# Patient Record
Sex: FEMALE | Race: WHITE | NOT HISPANIC OR LATINO | Employment: OTHER | URBAN - METROPOLITAN AREA
[De-identification: names, ages, dates, MRNs, and addresses within clinical notes are randomized per-mention and may not be internally consistent; named-entity substitution may affect disease eponyms.]

---

## 2019-07-16 ENCOUNTER — OFFICE VISIT (OUTPATIENT)
Dept: FAMILY MEDICINE CLINIC | Facility: CLINIC | Age: 49
End: 2019-07-16

## 2019-07-16 VITALS
WEIGHT: 190.4 LBS | OXYGEN SATURATION: 95 % | TEMPERATURE: 98.1 F | HEART RATE: 71 BPM | HEIGHT: 61 IN | SYSTOLIC BLOOD PRESSURE: 146 MMHG | DIASTOLIC BLOOD PRESSURE: 80 MMHG | BODY MASS INDEX: 35.95 KG/M2

## 2019-07-16 DIAGNOSIS — I10 ESSENTIAL HYPERTENSION: ICD-10-CM

## 2019-07-16 DIAGNOSIS — E78.5 HYPERLIPIDEMIA, UNSPECIFIED HYPERLIPIDEMIA TYPE: ICD-10-CM

## 2019-07-16 DIAGNOSIS — Z00.00 HEALTH MAINTENANCE EXAMINATION: Primary | ICD-10-CM

## 2019-07-16 DIAGNOSIS — R82.90 ABNORMAL URINALYSIS: ICD-10-CM

## 2019-07-16 DIAGNOSIS — K21.9 GASTROESOPHAGEAL REFLUX DISEASE WITHOUT ESOPHAGITIS: ICD-10-CM

## 2019-07-16 LAB
BILIRUB BLD-MCNC: NEGATIVE MG/DL
CLARITY, POC: CLEAR
COLOR UR: YELLOW
GLUCOSE UR STRIP-MCNC: NEGATIVE MG/DL
KETONES UR QL: ABNORMAL
LEUKOCYTE EST, POC: ABNORMAL
NITRITE UR-MCNC: NEGATIVE MG/ML
PH UR: 6 [PH] (ref 5–8)
PROT UR STRIP-MCNC: NEGATIVE MG/DL
RBC # UR STRIP: NEGATIVE /UL
SP GR UR: 1.01 (ref 1–1.03)
UROBILINOGEN UR QL: NORMAL

## 2019-07-16 PROCEDURE — 99396 PREV VISIT EST AGE 40-64: CPT | Performed by: NURSE PRACTITIONER

## 2019-07-16 PROCEDURE — 81003 URINALYSIS AUTO W/O SCOPE: CPT | Performed by: NURSE PRACTITIONER

## 2019-07-16 RX ORDER — LORATADINE 10 MG/1
10 TABLET ORAL DAILY
COMMUNITY
End: 2021-04-15

## 2019-07-16 RX ORDER — TRAZODONE HYDROCHLORIDE 50 MG/1
50 TABLET ORAL
Refills: 2 | COMMUNITY
Start: 2019-06-18

## 2019-07-16 RX ORDER — MIRTAZAPINE 7.5 MG/1
7.5 TABLET, FILM COATED ORAL
Refills: 2 | COMMUNITY
Start: 2019-06-26 | End: 2019-11-07

## 2019-07-16 RX ORDER — PERPHENAZINE 8 MG/1
8 TABLET ORAL
COMMUNITY
End: 2022-11-01

## 2019-07-16 RX ORDER — AMLODIPINE BESYLATE 5 MG/1
5 TABLET ORAL DAILY
Refills: 1 | COMMUNITY
Start: 2019-06-18 | End: 2019-07-22 | Stop reason: SDUPTHER

## 2019-07-16 RX ORDER — BENZTROPINE MESYLATE 1 MG/1
1 TABLET ORAL 2 TIMES DAILY
Refills: 5 | COMMUNITY
Start: 2019-06-18 | End: 2021-05-12 | Stop reason: SDUPTHER

## 2019-07-16 RX ORDER — BENZOCAINE AND MENTHOL 15; 2.6 MG/1; MG/1
LOZENGE ORAL
Refills: 5 | COMMUNITY
Start: 2019-06-04 | End: 2022-01-31

## 2019-07-16 RX ORDER — LORAZEPAM 1 MG/1
1 TABLET ORAL 2 TIMES DAILY
Refills: 5 | COMMUNITY
Start: 2019-06-25 | End: 2019-10-08 | Stop reason: SDUPTHER

## 2019-07-16 RX ORDER — METOPROLOL SUCCINATE 50 MG/1
50 TABLET, EXTENDED RELEASE ORAL DAILY
Refills: 3 | COMMUNITY
Start: 2019-06-30 | End: 2019-07-22 | Stop reason: SDUPTHER

## 2019-07-16 RX ORDER — NORGESTIMATE AND ETHINYL ESTRADIOL 7DAYSX3 LO
KIT ORAL
COMMUNITY
End: 2022-11-01 | Stop reason: ALTCHOICE

## 2019-07-16 RX ORDER — DOCUSATE SODIUM 100 MG/1
CAPSULE, LIQUID FILLED ORAL
COMMUNITY
End: 2020-05-18

## 2019-07-16 RX ORDER — OMEPRAZOLE 40 MG/1
40 CAPSULE, DELAYED RELEASE ORAL DAILY
Refills: 5 | COMMUNITY
Start: 2019-06-26 | End: 2019-08-20 | Stop reason: SDUPTHER

## 2019-07-16 RX ORDER — MIRTAZAPINE 15 MG/1
15 TABLET, FILM COATED ORAL
Refills: 1 | COMMUNITY
Start: 2019-04-12 | End: 2019-11-07

## 2019-07-16 RX ORDER — DIVALPROEX SODIUM 500 MG/1
1000 TABLET, DELAYED RELEASE ORAL 2 TIMES DAILY
Refills: 2 | COMMUNITY
Start: 2019-06-18

## 2019-07-16 RX ORDER — FLUTICASONE PROPIONATE 50 MCG
SPRAY, SUSPENSION (ML) NASAL
Refills: 11 | COMMUNITY
Start: 2019-05-30 | End: 2020-06-24

## 2019-07-16 RX ORDER — HALOPERIDOL 10 MG/1
10 TABLET ORAL 2 TIMES DAILY
Refills: 2 | COMMUNITY
Start: 2019-06-26 | End: 2023-02-01

## 2019-07-16 RX ORDER — 1.1% SODIUM FLUORIDE PRESCRIPTION DENTAL CREAM 5 MG/G
CREAM DENTAL
Refills: 3 | COMMUNITY
Start: 2019-05-30 | End: 2019-12-03 | Stop reason: SDUPTHER

## 2019-07-16 RX ORDER — CHLORHEXIDINE GLUCONATE 0.12 MG/ML
RINSE ORAL
Refills: 0 | COMMUNITY
Start: 2019-06-13 | End: 2019-07-22 | Stop reason: SDUPTHER

## 2019-07-16 RX ORDER — GEMFIBROZIL 600 MG/1
600 TABLET, FILM COATED ORAL
COMMUNITY
End: 2022-11-01

## 2019-07-16 RX ORDER — ACETAMINOPHEN AND CHLORPHENIRAMINE MALEATE 325; 2 MG/1; MG/1
1 TABLET, FILM COATED ORAL EVERY 8 HOURS PRN
Refills: 0 | COMMUNITY
Start: 2019-06-04 | End: 2019-08-07 | Stop reason: SDUPTHER

## 2019-07-16 NOTE — PROGRESS NOTES
"Subjective   Charity Orr is a 48 y.o. female here for her annual physical with me. Charity is here for coordination of medical care, to discuss health maintenance, disease prevention as well as to followup on medical problems. History is obtained from caregiver.    History of Present Illness   Patient has been followed by Dr. Rodriguez for about 10 years. Patient's last CPE was 7/2/18. Activity level is minimal. Exercises 5 days per week. Appetite is good. Feels well with no complaints. Energy level is good. Sleeps well. Patient's last mammogram was this year per patient. Mammogram is not available for review at this time. Patient is doing routine self skin exam occasionally. Patient is not doing routine self-breast exams.  Patient's last Pap smear was on 4/17/2019 performed by JANKI Rivers.     Review of Systems   Constitutional: Negative for fever.   HENT: Negative for ear pain, rhinorrhea and sore throat.    Eyes: Negative for visual disturbance.   Respiratory: Negative for cough and shortness of breath.    Cardiovascular: Negative for chest pain.   Gastrointestinal: Negative for abdominal pain, nausea and vomiting.   Genitourinary: Negative for dysuria.   Musculoskeletal: Negative.    Skin: Negative.    Neurological: Negative for dizziness and headache.   Psychiatric/Behavioral: Negative for suicidal ideas and depressed mood.        Objective   Visit Vitals  /80 (BP Location: Left arm, Patient Position: Sitting)   Pulse 71   Temp 98.1 °F (36.7 °C)   Ht 154.9 cm (61\")   Wt 86.4 kg (190 lb 6.4 oz)   SpO2 95%   BMI 35.98 kg/m²     Physical Exam   Constitutional: She appears well-developed.   HENT:   Right Ear: Tympanic membrane and ear canal normal.   Left Ear: Tympanic membrane and ear canal normal.   Nose: Nose normal.   Mouth/Throat: Oropharynx is clear and moist.   Eyes: Pupils are equal, round, and reactive to light.   Neck: Neck supple.   Cardiovascular: Normal rate, regular rhythm and normal " heart sounds.   Pulmonary/Chest: Effort normal and breath sounds normal.   Abdominal: Soft. Bowel sounds are normal.   Genitourinary:   Genitourinary Comments: deferred   Musculoskeletal: Normal range of motion.   Neurological: She is alert.   Skin: Skin is warm and dry.   Psychiatric: She has a normal mood and affect.   Nursing note and vitals reviewed.      Assessment/Plan   Problem List Items Addressed This Visit        Cardiovascular and Mediastinum    Hypertension    Relevant Medications    amLODIPine (NORVASC) 5 MG tablet    metoprolol succinate XL (TOPROL-XL) 50 MG 24 hr tablet    Other Relevant Orders    Comprehensive metabolic panel       Digestive    GERD (gastroesophageal reflux disease)    Relevant Medications    omeprazole (priLOSEC) 40 MG capsule      Other Visit Diagnoses     Health maintenance examination    -  Primary    Relevant Orders    CBC and Differential    POC Urinalysis Dipstick, Automated (Completed)    Hyperlipidemia, unspecified hyperlipidemia type        Relevant Medications    gemfibrozil (LOPID) 600 MG tablet    Other Relevant Orders    Lipid Panel With LDL/HDL Ratio    Abnormal urinalysis        Relevant Orders    Urine Culture - Urine, Urine, Clean Catch               Encouraged to check her skin and breasts monthly. Reviewed exercising regularly and eating a balanced diet.

## 2019-07-17 LAB
ALBUMIN SERPL-MCNC: 4.1 G/DL (ref 3.5–5.2)
ALBUMIN/GLOB SERPL: 2 G/DL
ALP SERPL-CCNC: 60 U/L (ref 39–117)
ALT SERPL-CCNC: 11 U/L (ref 1–33)
AST SERPL-CCNC: 21 U/L (ref 1–32)
BASOPHILS # BLD AUTO: 0.05 10*3/MM3 (ref 0–0.2)
BASOPHILS NFR BLD AUTO: 0.4 % (ref 0–1.5)
BILIRUB SERPL-MCNC: 0.2 MG/DL (ref 0.2–1.2)
BUN SERPL-MCNC: 9 MG/DL (ref 6–20)
BUN/CREAT SERPL: 18 (ref 7–25)
CALCIUM SERPL-MCNC: 8.9 MG/DL (ref 8.6–10.5)
CHLORIDE SERPL-SCNC: 103 MMOL/L (ref 98–107)
CHOLEST SERPL-MCNC: 103 MG/DL (ref 0–200)
CO2 SERPL-SCNC: 25.9 MMOL/L (ref 22–29)
CREAT SERPL-MCNC: 0.5 MG/DL (ref 0.57–1)
EOSINOPHIL # BLD AUTO: 0.1 10*3/MM3 (ref 0–0.4)
EOSINOPHIL NFR BLD AUTO: 0.9 % (ref 0.3–6.2)
ERYTHROCYTE [DISTWIDTH] IN BLOOD BY AUTOMATED COUNT: 14.4 % (ref 12.3–15.4)
GLOBULIN SER CALC-MCNC: 2.1 GM/DL
GLUCOSE SERPL-MCNC: 100 MG/DL (ref 65–99)
HCT VFR BLD AUTO: 44.5 % (ref 34–46.6)
HDLC SERPL-MCNC: 32 MG/DL (ref 40–60)
HGB BLD-MCNC: 13.9 G/DL (ref 12–15.9)
IMM GRANULOCYTES # BLD AUTO: 0.04 10*3/MM3 (ref 0–0.05)
IMM GRANULOCYTES NFR BLD AUTO: 0.3 % (ref 0–0.5)
LDLC SERPL CALC-MCNC: 8 MG/DL (ref 0–100)
LDLC/HDLC SERPL: 0.24 {RATIO}
LYMPHOCYTES # BLD AUTO: 4.28 10*3/MM3 (ref 0.7–3.1)
LYMPHOCYTES NFR BLD AUTO: 36.9 % (ref 19.6–45.3)
MCH RBC QN AUTO: 30.6 PG (ref 26.6–33)
MCHC RBC AUTO-ENTMCNC: 31.2 G/DL (ref 31.5–35.7)
MCV RBC AUTO: 98 FL (ref 79–97)
MONOCYTES # BLD AUTO: 0.71 10*3/MM3 (ref 0.1–0.9)
MONOCYTES NFR BLD AUTO: 6.1 % (ref 5–12)
NEUTROPHILS # BLD AUTO: 6.41 10*3/MM3 (ref 1.7–7)
NEUTROPHILS NFR BLD AUTO: 55.4 % (ref 42.7–76)
NRBC BLD AUTO-RTO: 0 /100 WBC (ref 0–0.2)
PLATELET # BLD AUTO: 221 10*3/MM3 (ref 140–450)
POTASSIUM SERPL-SCNC: 4.1 MMOL/L (ref 3.5–5.2)
PROT SERPL-MCNC: 6.2 G/DL (ref 6–8.5)
RBC # BLD AUTO: 4.54 10*6/MM3 (ref 3.77–5.28)
SODIUM SERPL-SCNC: 141 MMOL/L (ref 136–145)
TRIGL SERPL-MCNC: 317 MG/DL (ref 0–150)
VLDLC SERPL CALC-MCNC: 63.4 MG/DL
WBC # BLD AUTO: 11.59 10*3/MM3 (ref 3.4–10.8)

## 2019-07-18 LAB
BACTERIA UR CULT: NORMAL
BACTERIA UR CULT: NORMAL

## 2019-07-19 ENCOUNTER — TELEPHONE (OUTPATIENT)
Dept: FAMILY MEDICINE CLINIC | Facility: CLINIC | Age: 49
End: 2019-07-19

## 2019-07-19 NOTE — TELEPHONE ENCOUNTER
White blood cell count is elevated which is consistent with history of leukocytosis.  We will continue to monitor.    Triglycerides are elevated.  Has patient been taking gemfibrozil 600 mg twice daily?  If not, was there a reason for discontinuation?

## 2019-07-22 RX ORDER — CHLORHEXIDINE GLUCONATE 0.12 MG/ML
RINSE ORAL
Qty: 473 ML | Refills: 11 | Status: SHIPPED | OUTPATIENT
Start: 2019-07-22 | End: 2021-08-02

## 2019-07-22 RX ORDER — AMLODIPINE BESYLATE 5 MG/1
5 TABLET ORAL DAILY
Qty: 30 TABLET | Refills: 11 | Status: SHIPPED | OUTPATIENT
Start: 2019-07-22 | End: 2020-08-10

## 2019-07-22 RX ORDER — METOPROLOL SUCCINATE 50 MG/1
50 TABLET, EXTENDED RELEASE ORAL DAILY
Qty: 30 TABLET | Refills: 11 | Status: SHIPPED | OUTPATIENT
Start: 2019-07-22 | End: 2020-06-15

## 2019-08-08 RX ORDER — ACETAMINOPHEN AND CHLORPHENIRAMINE MALEATE 325; 2 MG/1; MG/1
TABLET, FILM COATED ORAL
Qty: 10 TABLET | Refills: 11 | Status: SHIPPED | OUTPATIENT
Start: 2019-08-08 | End: 2022-02-01

## 2019-08-20 RX ORDER — OMEPRAZOLE 40 MG/1
40 CAPSULE, DELAYED RELEASE ORAL DAILY
Qty: 30 CAPSULE | Refills: 2 | Status: SHIPPED | OUTPATIENT
Start: 2019-08-20 | End: 2019-11-04 | Stop reason: SDUPTHER

## 2019-09-24 ENCOUNTER — OFFICE VISIT (OUTPATIENT)
Dept: FAMILY MEDICINE CLINIC | Facility: CLINIC | Age: 49
End: 2019-09-24

## 2019-09-24 VITALS
BODY MASS INDEX: 37.08 KG/M2 | TEMPERATURE: 97.8 F | HEIGHT: 61 IN | DIASTOLIC BLOOD PRESSURE: 74 MMHG | WEIGHT: 196.4 LBS | HEART RATE: 85 BPM | SYSTOLIC BLOOD PRESSURE: 126 MMHG | OXYGEN SATURATION: 95 %

## 2019-09-24 DIAGNOSIS — I10 ESSENTIAL HYPERTENSION: Primary | ICD-10-CM

## 2019-09-24 DIAGNOSIS — Z23 NEED FOR IMMUNIZATION AGAINST INFLUENZA: ICD-10-CM

## 2019-09-24 DIAGNOSIS — K21.9 GASTROESOPHAGEAL REFLUX DISEASE WITHOUT ESOPHAGITIS: ICD-10-CM

## 2019-09-24 PROBLEM — F25.9 SCHIZOAFFECTIVE DISORDER: Status: ACTIVE | Noted: 2018-08-27

## 2019-09-24 PROCEDURE — 99213 OFFICE O/P EST LOW 20 MIN: CPT | Performed by: INTERNAL MEDICINE

## 2019-09-24 PROCEDURE — 90471 IMMUNIZATION ADMIN: CPT | Performed by: INTERNAL MEDICINE

## 2019-09-24 PROCEDURE — 90686 IIV4 VACC NO PRSV 0.5 ML IM: CPT | Performed by: INTERNAL MEDICINE

## 2019-09-24 RX ORDER — DIPHENOXYLATE HYDROCHLORIDE AND ATROPINE SULFATE 2.5; .025 MG/1; MG/1
1 TABLET ORAL DAILY
Refills: 11 | COMMUNITY
Start: 2019-09-10 | End: 2020-05-18

## 2019-09-24 RX ORDER — BENZOCAINE AND MENTHOL 15; 2.6 MG/1; MG/1
LOZENGE ORAL
Refills: 11 | COMMUNITY
Start: 2019-08-07 | End: 2019-09-24

## 2019-09-24 NOTE — PROGRESS NOTES
Subjective   Charity Orr is a 48 y.o. female.     Chief Complaint   Patient presents with   • other       History of Present Illness   Here for follow up and blood pressure protocol.  Follow-up for hypertension.  Currently has been feeling well and asymptomatic without any headaches,vision changes, cough, chest pain, shortness of breath, swelling, focal neurologic deficit, memory loss or syncope.  Has been taking the medications regularly and adherent with the regimen, Denies medication side effects and no significant interval events.  Home blood pressure has been doing well in the group home.  Patient with schizoaffective disorder and with caregiver from the group home.  Still has some delusions that are unchanges.    The following portions of the patient's history were reviewed and updated as appropriate: allergies, current medications, past family history, past medical history, past social history, past surgical history and problem list.    Past Medical History:   Diagnosis Date   • Allergic rhinitis    • Back pain    • Dietary calcium deficiency    • Esophageal reflux    • Essential hypertriglyceridemia    • Flu vaccine need    • Foot pain, left    • GERD (gastroesophageal reflux disease)    • History of allergy    • Hyperlipidemia    • Hypertension    • Left foot pain    • Leukocytosis    • Lumbar muscle pain    • Mild mental retardation    • Mild mental retardation    • Pain    • Schizophrenic disorder (CMS/HCC)    • Soft tissue mass    • Sprain of ankle    • Tinea pedis    • Tremor due to multiple drugs    • UTI (urinary tract infection)        No past surgical history on file.    Family History   Problem Relation Age of Onset   • No Known Problems Mother        Social History     Socioeconomic History   • Marital status: Single     Spouse name: Not on file   • Number of children: Not on file   • Years of education: Not on file   • Highest education level: Not on file   Tobacco Use   • Smoking status:  Never Smoker   • Smokeless tobacco: Never Used   Substance and Sexual Activity   • Alcohol use: No     Frequency: Never   • Drug use: No       Review of Systems   Constitutional: Negative for activity change, appetite change, fatigue, fever, unexpected weight gain and unexpected weight loss.   HENT: Negative for nosebleeds, rhinorrhea, trouble swallowing and voice change.    Eyes: Negative for visual disturbance.   Respiratory: Negative for cough, chest tightness, shortness of breath and wheezing.    Cardiovascular: Negative for chest pain, palpitations and leg swelling.   Gastrointestinal: Negative for abdominal pain, blood in stool, constipation, diarrhea, nausea, vomiting, GERD and indigestion.   Genitourinary: Negative for dysuria, frequency and hematuria.   Musculoskeletal: Negative for arthralgias, back pain and myalgias.   Skin: Negative for rash and bruise.   Neurological: Negative for dizziness, tremors, weakness, light-headedness, numbness, headache and memory problem.   Hematological: Negative for adenopathy. Does not bruise/bleed easily.   Psychiatric/Behavioral: Negative for sleep disturbance and depressed mood. The patient is not nervous/anxious.        Objective   Vitals:    09/24/19 1127   BP: 126/74   Pulse: 85   Temp: 97.8 °F (36.6 °C)   SpO2: 95%     Body mass index is 37.11 kg/m².  Physical Exam   Constitutional: She is oriented to person, place, and time. She appears well-developed and well-nourished. No distress.   HENT:   Head: Normocephalic and atraumatic.   Right Ear: External ear normal.   Left Ear: External ear normal.   Nose: Nose normal.   Mouth/Throat: Oropharynx is clear and moist.   Eyes: Conjunctivae and EOM are normal. Pupils are equal, round, and reactive to light.   Neck: Normal range of motion. Neck supple. No tracheal deviation present. No thyromegaly present.   Cardiovascular: Normal rate, regular rhythm, normal heart sounds and intact distal pulses. Exam reveals no gallop and  no friction rub.   No murmur heard.  Pulmonary/Chest: Effort normal and breath sounds normal. No respiratory distress.   Abdominal: Soft. Bowel sounds are normal. She exhibits no mass. There is no tenderness. There is no guarding.   Musculoskeletal: Normal range of motion. She exhibits no edema.   Lymphadenopathy:     She has no cervical adenopathy.   Neurological: She is alert and oriented to person, place, and time. She displays normal reflexes. She exhibits normal muscle tone.   Skin: Skin is warm and dry. Capillary refill takes less than 2 seconds. No rash noted. She is not diaphoretic.   Psychiatric: She has a normal mood and affect. Her behavior is normal. Judgment and thought content normal.   Nursing note and vitals reviewed.      Recent Results (from the past 2016 hour(s))   Comprehensive metabolic panel    Collection Time: 07/16/19 10:05 AM   Result Value Ref Range    Glucose 100 (H) 65 - 99 mg/dL    BUN 9 6 - 20 mg/dL    Creatinine 0.50 (L) 0.57 - 1.00 mg/dL    eGFR Non African Am 132 >60 mL/min/1.73    eGFR African Am >150 >60 mL/min/1.73    BUN/Creatinine Ratio 18.0 7.0 - 25.0    Sodium 141 136 - 145 mmol/L    Potassium 4.1 3.5 - 5.2 mmol/L    Chloride 103 98 - 107 mmol/L    Total CO2 25.9 22.0 - 29.0 mmol/L    Calcium 8.9 8.6 - 10.5 mg/dL    Total Protein 6.2 6.0 - 8.5 g/dL    Albumin 4.10 3.50 - 5.20 g/dL    Globulin 2.1 gm/dL    A/G Ratio 2.0 g/dL    Total Bilirubin 0.2 0.2 - 1.2 mg/dL    Alkaline Phosphatase 60 39 - 117 U/L    AST (SGOT) 21 1 - 32 U/L    ALT (SGPT) 11 1 - 33 U/L   Lipid Panel With LDL/HDL Ratio    Collection Time: 07/16/19 10:05 AM   Result Value Ref Range    Total Cholesterol 103 0 - 200 mg/dL    Triglycerides 317 (H) 0 - 150 mg/dL    HDL Cholesterol 32 (L) 40 - 60 mg/dL    VLDL Cholesterol 63.4 mg/dL    LDL Cholesterol  8 0 - 100 mg/dL    LDL/HDL Ratio 0.24    CBC and Differential    Collection Time: 07/16/19 10:05 AM   Result Value Ref Range    WBC 11.59 (H) 3.40 - 10.80 10*3/mm3     RBC 4.54 3.77 - 5.28 10*6/mm3    Hemoglobin 13.9 12.0 - 15.9 g/dL    Hematocrit 44.5 34.0 - 46.6 %    MCV 98.0 (H) 79.0 - 97.0 fL    MCH 30.6 26.6 - 33.0 pg    MCHC 31.2 (L) 31.5 - 35.7 g/dL    RDW 14.4 12.3 - 15.4 %    Platelets 221 140 - 450 10*3/mm3    Neutrophil Rel % 55.4 42.7 - 76.0 %    Lymphocyte Rel % 36.9 19.6 - 45.3 %    Monocyte Rel % 6.1 5.0 - 12.0 %    Eosinophil Rel % 0.9 0.3 - 6.2 %    Basophil Rel % 0.4 0.0 - 1.5 %    Neutrophils Absolute 6.41 1.70 - 7.00 10*3/mm3    Lymphocytes Absolute 4.28 (H) 0.70 - 3.10 10*3/mm3    Monocytes Absolute 0.71 0.10 - 0.90 10*3/mm3    Eosinophils Absolute 0.10 0.00 - 0.40 10*3/mm3    Basophils Absolute 0.05 0.00 - 0.20 10*3/mm3    Immature Granulocyte Rel % 0.3 0.0 - 0.5 %    Immature Grans Absolute 0.04 0.00 - 0.05 10*3/mm3    nRBC 0.0 0.0 - 0.2 /100 WBC   POC Urinalysis Dipstick, Automated    Collection Time: 07/16/19 11:13 AM   Result Value Ref Range    Color Yellow Yellow, Straw, Dark Yellow, Monse    Clarity, UA Clear Clear    Specific Gravity  1.015 1.005 - 1.030    pH, Urine 6.0 5.0 - 8.0    Leukocytes Large (3+) (A) Negative    Nitrite, UA Negative Negative    Protein, POC Negative Negative mg/dL    Glucose, UA Negative Negative, 1000 mg/dL (3+) mg/dL    Ketones, UA Trace (A) Negative    Urobilinogen, UA Normal Normal    Bilirubin Negative Negative    Blood, UA Negative Negative   Urine Culture - Urine, Urine, Clean Catch    Collection Time: 07/16/19 12:00 PM   Result Value Ref Range    Urine Culture Final report     Result 1 Comment      Assessment/Plan   Charity was seen today for other.    Diagnoses and all orders for this visit:    Essential hypertension    Gastroesophageal reflux disease without esophagitis    Need for immunization against influenza  -     Fluarix/Fluzone/Afluria Quad>6 Months; Standing  -     Fluarix/Fluzone/Afluria Quad>6 Months    Continue the current medications and no changes.  Labs reviewed.  Given note for blood pressure and GERD  protocol.  See note.

## 2019-10-08 RX ORDER — LORAZEPAM 1 MG/1
TABLET ORAL
Qty: 60 TABLET | Refills: 2 | Status: SHIPPED | OUTPATIENT
Start: 2019-10-08 | End: 2019-10-14 | Stop reason: SDUPTHER

## 2019-10-14 RX ORDER — LORAZEPAM 1 MG/1
TABLET ORAL
Qty: 60 TABLET | Refills: 2 | Status: SHIPPED | OUTPATIENT
Start: 2019-10-14 | End: 2020-02-24

## 2019-11-04 RX ORDER — OMEPRAZOLE 40 MG/1
CAPSULE, DELAYED RELEASE ORAL
Qty: 30 CAPSULE | Refills: 2 | Status: SHIPPED | OUTPATIENT
Start: 2019-11-04 | End: 2020-01-28

## 2019-11-07 RX ORDER — MIRTAZAPINE 7.5 MG/1
7.5 TABLET, FILM COATED ORAL
Qty: 30 TABLET | Refills: 5 | Status: SHIPPED | OUTPATIENT
Start: 2019-11-07 | End: 2020-03-23

## 2019-12-03 RX ORDER — LORAZEPAM 1 MG/1
TABLET ORAL
Qty: 60 TABLET | Refills: 1 | Status: SHIPPED | OUTPATIENT
Start: 2019-12-03 | End: 2020-08-10

## 2019-12-04 RX ORDER — 1.1% SODIUM FLUORIDE PRESCRIPTION DENTAL CREAM 5 MG/G
CREAM DENTAL
Qty: 51 G | Refills: 1 | Status: SHIPPED | OUTPATIENT
Start: 2019-12-04 | End: 2020-01-20

## 2019-12-24 ENCOUNTER — APPOINTMENT (OUTPATIENT)
Dept: GENERAL RADIOLOGY | Facility: HOSPITAL | Age: 49
End: 2019-12-24

## 2019-12-24 ENCOUNTER — HOSPITAL ENCOUNTER (EMERGENCY)
Facility: HOSPITAL | Age: 49
Discharge: HOME | End: 2019-12-24
Attending: EMERGENCY MEDICINE | Admitting: EMERGENCY MEDICINE

## 2019-12-24 VITALS
TEMPERATURE: 99.1 F | RESPIRATION RATE: 16 BRPM | SYSTOLIC BLOOD PRESSURE: 155 MMHG | HEART RATE: 73 BPM | DIASTOLIC BLOOD PRESSURE: 96 MMHG | WEIGHT: 198 LBS | OXYGEN SATURATION: 98 % | BODY MASS INDEX: 36.44 KG/M2 | HEIGHT: 62 IN

## 2019-12-24 DIAGNOSIS — F20.9 SCHIZOPHRENIA, UNSPECIFIED TYPE (HCC): ICD-10-CM

## 2019-12-24 DIAGNOSIS — G89.29 CHRONIC PAIN OF RIGHT ANKLE: ICD-10-CM

## 2019-12-24 DIAGNOSIS — M54.50 ACUTE MIDLINE LOW BACK PAIN WITHOUT SCIATICA: Primary | ICD-10-CM

## 2019-12-24 DIAGNOSIS — M25.571 CHRONIC PAIN OF RIGHT ANKLE: ICD-10-CM

## 2019-12-24 PROCEDURE — 72110 X-RAY EXAM L-2 SPINE 4/>VWS: CPT

## 2019-12-24 PROCEDURE — 73610 X-RAY EXAM OF ANKLE: CPT

## 2019-12-24 PROCEDURE — 99283 EMERGENCY DEPT VISIT LOW MDM: CPT

## 2019-12-24 RX ORDER — ACETAMINOPHEN 500 MG
1000 TABLET ORAL ONCE
Status: COMPLETED | OUTPATIENT
Start: 2019-12-24 | End: 2019-12-24

## 2019-12-24 RX ADMIN — ACETAMINOPHEN 1000 MG: 500 TABLET, FILM COATED ORAL at 20:50

## 2019-12-25 NOTE — ED NOTES
This RN spoke to pt son Zak to inform of d/c back to group home.      Maricel Preciado, RN  12/24/19 0383
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
## 2019-12-25 NOTE — DISCHARGE INSTRUCTIONS
Use over-the-counter Tylenol as needed for pain.  Follow-up with your family doctor.  You do not have any broken bones.

## 2019-12-25 NOTE — ED NOTES
This RN spoke to Emily from [facility] where pt resides. Report given to Emily and she is enroute to pick pt up from ED.      Maricel Preciado RN  12/24/19 0213

## 2019-12-25 NOTE — ED NOTES
Pt son Rizwan, who is POA, called to give contact information. Phone number is [phone]     Maricel Perciado RN  12/24/19 2032
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
## 2020-01-20 RX ORDER — 1.1% SODIUM FLUORIDE PRESCRIPTION DENTAL CREAM 5 MG/G
CREAM DENTAL
Qty: 51 G | Refills: 5 | Status: SHIPPED | OUTPATIENT
Start: 2020-01-20 | End: 2021-08-02

## 2020-01-22 ENCOUNTER — OFFICE VISIT (OUTPATIENT)
Dept: FAMILY MEDICINE CLINIC | Facility: CLINIC | Age: 50
End: 2020-01-22

## 2020-01-22 VITALS
TEMPERATURE: 97.6 F | SYSTOLIC BLOOD PRESSURE: 106 MMHG | OXYGEN SATURATION: 95 % | WEIGHT: 202 LBS | HEART RATE: 80 BPM | DIASTOLIC BLOOD PRESSURE: 70 MMHG | HEIGHT: 62 IN | BODY MASS INDEX: 37.17 KG/M2

## 2020-01-22 DIAGNOSIS — E66.9 CLASS 2 OBESITY WITHOUT SERIOUS COMORBIDITY WITH BODY MASS INDEX (BMI) OF 36.0 TO 36.9 IN ADULT, UNSPECIFIED OBESITY TYPE: ICD-10-CM

## 2020-01-22 DIAGNOSIS — S39.012A STRAIN OF LUMBAR REGION, INITIAL ENCOUNTER: Primary | ICD-10-CM

## 2020-01-22 PROCEDURE — 99213 OFFICE O/P EST LOW 20 MIN: CPT | Performed by: INTERNAL MEDICINE

## 2020-01-22 RX ORDER — ASCORBIC ACID 500 MG
500 TABLET ORAL DAILY
COMMUNITY
Start: 2019-12-30 | End: 2020-05-18

## 2020-01-22 NOTE — PATIENT INSTRUCTIONS
Back Exercises  These exercises help to make your trunk and back strong. They also help to keep the lower back flexible. Doing these exercises can help to prevent back pain or lessen existing pain.  · If you have back pain, try to do these exercises 2-3 times each day or as told by your doctor.  · As you get better, do the exercises once each day. Repeat the exercises more often as told by your doctor.  · To stop back pain from coming back, do the exercises once each day, or as told by your doctor.  Exercises  Single knee to chest  Do these steps 3-5 times in a row for each le. Lie on your back on a firm bed or the floor with your legs stretched out.  2. Bring one knee to your chest.  3. Grab your knee or thigh with both hands and hold them it in place.  4. Pull on your knee until you feel a gentle stretch in your lower back or buttocks.  5. Keep doing the stretch for 10-30 seconds.  6. Slowly let go of your leg and straighten it.  Pelvic tilt  Do these steps 5-10 times in a row:  1. Lie on your back on a firm bed or the floor with your legs stretched out.  2. Bend your knees so they point up to the ceiling. Your feet should be flat on the floor.  3. Tighten your lower belly (abdomen) muscles to press your lower back against the floor. This will make your tailbone point up to the ceiling instead of pointing down to your feet or the floor.  4. Stay in this position for 5-10 seconds while you gently tighten your muscles and breathe evenly.  Cat-cow  Do these steps until your lower back bends more easily:  1. Get on your hands and knees on a firm surface. Keep your hands under your shoulders, and keep your knees under your hips. You may put padding under your knees.  2. Let your head hang down toward your chest. Tighten (contract) the muscles in your belly. Point your tailbone toward the floor so your lower back becomes rounded like the back of a cat.  3. Stay in this position for 5 seconds.  4. Slowly lift your  head. Let the muscles of your belly relax. Point your tailbone up toward the ceiling so your back forms a sagging arch like the back of a cow.  5. Stay in this position for 5 seconds.    Press-ups  Do these steps 5-10 times in a row:  1. Lie on your belly (face-down) on the floor.  2. Place your hands near your head, about shoulder-width apart.  3. While you keep your back relaxed and keep your hips on the floor, slowly straighten your arms to raise the top half of your body and lift your shoulders. Do not use your back muscles. You may change where you place your hands in order to make yourself more comfortable.  4. Stay in this position for 5 seconds.  5. Slowly return to lying flat on the floor.    Bridges  Do these steps 10 times in a row:  1. Lie on your back on a firm surface.  2. Bend your knees so they point up to the ceiling. Your feet should be flat on the floor. Your arms should be flat at your sides, next to your body.  3. Tighten your butt muscles and lift your butt off the floor until your waist is almost as high as your knees. If you do not feel the muscles working in your butt and the back of your thighs, slide your feet 1-2 inches farther away from your butt.  4. Stay in this position for 3-5 seconds.  5. Slowly lower your butt to the floor, and let your butt muscles relax.  If this exercise is too easy, try doing it with your arms crossed over your chest.  Belly crunches  Do these steps 5-10 times in a row:  1. Lie on your back on a firm bed or the floor with your legs stretched out.  2. Bend your knees so they point up to the ceiling. Your feet should be flat on the floor.  3. Cross your arms over your chest.  4. Tip your chin a little bit toward your chest but do not bend your neck.  5. Tighten your belly muscles and slowly raise your chest just enough to lift your shoulder blades a tiny bit off of the floor. Avoid raising your body higher than that, because it can put too much stress on your low  back.  6. Slowly lower your chest and your head to the floor.  Back lifts  Do these steps 5-10 times in a row:  1. Lie on your belly (face-down) with your arms at your sides, and rest your forehead on the floor.  2. Tighten the muscles in your legs and your butt.  3. Slowly lift your chest off of the floor while you keep your hips on the floor. Keep the back of your head in line with the curve in your back. Look at the floor while you do this.  4. Stay in this position for 3-5 seconds.  5. Slowly lower your chest and your face to the floor.  Contact a doctor if:  · Your back pain gets a lot worse when you do an exercise.  · Your back pain does not get better 2 hours after you exercise.  If you have any of these problems, stop doing the exercises. Do not do them again unless your doctor says it is okay.  Get help right away if:  · You have sudden, very bad back pain. If this happens, stop doing the exercises. Do not do them again unless your doctor says it is okay.  This information is not intended to replace advice given to you by your health care provider. Make sure you discuss any questions you have with your health care provider.  Document Released: 01/20/2012 Document Revised: 09/12/2019 Document Reviewed: 09/12/2019  [employer] Interactive Patient Education © 2019 [employer]  Lumbosacral Strain  Lumbosacral strain is an injury that causes pain in the lower back (lumbosacral spine). This injury usually occurs from overstretching the muscles or ligaments along your spine. A strain can affect one or more muscles or cord-like tissues that connect bones to other bones (ligaments).  What are the causes?  This condition may be caused by:  · A hard, direct hit (blow) to the back.  · Excessive stretching of the lower back muscles. This may result from:  ? A fall.  ? Lifting something heavy.  ? Repetitive movements such as bending or crouching.  What increases the risk?  The following factors may increase your risk of  getting this condition:  · Participating in sports or activities that involve:  ? A sudden twist of the back.  ? Pushing or pulling motions.  · Being overweight or obese.  · Having poor strength and flexibility, especially tight hamstrings or weak muscles in the back or abdomen.  · Having too much of a curve in the lower back.  · Having a pelvis that is tilted forward.  What are the signs or symptoms?  The main symptom of this condition is pain in the lower back, at the site of the strain. Pain may extend (radiate) down one or both legs.  How is this diagnosed?  This condition is diagnosed based on:  · Your symptoms.  · Your medical history.  · A physical exam.  ? Your health care provider may push on certain areas of your back to determine the source of your pain.  ? You may be asked to bend forward, backward, and side to side to assess the severity of your pain and your range of motion.  · Imaging tests, such as:  ? X-rays.  ? MRI.    How is this treated?  Treatment for this condition may include:  · Putting heat and cold on the affected area.  · Medicines to help relieve pain and relax your muscles (muscle relaxants).  · NSAIDs to help reduce swelling and discomfort.  When your symptoms improve, it is important to gradually return to your normal routine as soon as possible to reduce pain, avoid stiffness, and avoid loss of muscle strength. Generally, symptoms should improve within 6 weeks of treatment. However, recovery time varies.  Follow these instructions at home:  Managing pain, stiffness, and swelling    · If directed, put ice on the injured area during the first 24 hours after your strain.  ? Put ice in a plastic bag.  ? Place a towel between your skin and the bag.  ? Leave the ice on for 20 minutes, 2-3 times a day.  · If directed, put heat on the affected area as often as told by your health care provider. Use the heat source that your health care provider recommends, such as a moist heat pack or a  heating pad.  ? Place a towel between your skin and the heat source.  ? Leave the heat on for 20-30 minutes.  ? Remove the heat if your skin turns bright red. This is especially important if you are unable to feel pain, heat, or cold. You may have a greater risk of getting burned.  Activity  · Rest and return to your normal activities as told by your health care provider. Ask your health care provider what activities are safe for you.  · Avoid activities that take a lot of energy for as long as told by your health care provider.  General instructions  · Take over-the-counter and prescription medicines only as told by your health care provider.  · Do not drive or use heavy machinery while taking prescription pain medicine.  · Do not use any products that contain nicotine or tobacco, such as cigarettes and e-cigarettes. If you need help quitting, ask your health care provider.  · Keep all follow-up visits as told by your health care provider. This is important.  How is this prevented?  · Use correct form when playing sports and lifting heavy objects.  · Use good posture when sitting and standing.  · Maintain a healthy weight.  · Sleep on a mattress with medium firmness to support your back.  · Be safe and responsible while being active to avoid falls.  · Do at least 150 minutes of moderate-intensity exercise each week, such as brisk walking or water aerobics. Try a form of exercise that takes stress off your back, such as swimming or stationary cycling.  · Maintain physical fitness, including:  ? Strength.  ? Flexibility.  ? Cardiovascular fitness.  ? Endurance.  Contact a health care provider if:  · Your back pain does not improve after 6 weeks of treatment.  · Your symptoms get worse.  Get help right away if:  · Your back pain is severe.  · You cannot stand or walk.  · You have difficulty controlling when you urinate or when you have a bowel movement.  · You feel nauseous or you vomit.  · Your feet get very  cold.  · You have numbness, tingling, weakness, or problems using your arms or legs.  · You develop any of the following:  ? Shortness of breath.  ? Dizziness.  ? Pain in your legs.  ? Weakness in your buttocks or legs.  ? Discoloration of the skin on your toes or legs.  This information is not intended to replace advice given to you by your health care provider. Make sure you discuss any questions you have with your health care provider.  Document Released: 09/27/2006 Document Revised: 07/07/2017 Document Reviewed: 05/21/2017  [employer] Interactive Patient Education © 2019 [employer]      Exercising to Lose Weight  Exercise is structured, repetitive physical activity to improve fitness and health. Getting regular exercise is important for everyone. It is especially important if you are overweight. Being overweight increases your risk of heart disease, stroke, diabetes, high blood pressure, and several types of cancer. Reducing your calorie intake and exercising can help you lose weight.  Exercise is usually categorized as moderate or vigorous intensity. To lose weight, most people need to do a certain amount of moderate-intensity or vigorous-intensity exercise each week.  Moderate-intensity exercise    Moderate-intensity exercise is any activity that gets you moving enough to burn at least three times more energy (calories) than if you were sitting.  Examples of moderate exercise include:  · Walking a mile in 15 minutes.  · Doing light yard work.  · Biking at an easy pace.  Most people should get at least 150 minutes (2 hours and 30 minutes) a week of moderate-intensity exercise to maintain their body weight.  Vigorous-intensity exercise  Vigorous-intensity exercise is any activity that gets you moving enough to burn at least six times more calories than if you were sitting. When you exercise at this intensity, you should be working hard enough that you are not able to carry on a conversation.  Examples of  vigorous exercise include:  · Running.  · Playing a team sport, such as football, basketball, and soccer.  · Jumping rope.  Most people should get at least 75 minutes (1 hour and 15 minutes) a week of vigorous-intensity exercise to maintain their body weight.  How can exercise affect me?  When you exercise enough to burn more calories than you eat, you lose weight. Exercise also reduces body fat and builds muscle. The more muscle you have, the more calories you burn. Exercise also:  · Improves mood.  · Reduces stress and tension.  · Improves your overall fitness, flexibility, and endurance.  · Increases bone strength.  The amount of exercise you need to lose weight depends on:  · Your age.  · The type of exercise.  · Any health conditions you have.  · Your overall physical ability.  Talk to your health care provider about how much exercise you need and what types of activities are safe for you.  What actions can I take to lose weight?  Nutrition    · Make changes to your diet as told by your health care provider or diet and nutrition specialist (dietitian). This may include:  ? Eating fewer calories.  ? Eating more protein.  ? Eating less unhealthy fats.  ? Eating a diet that includes fresh fruits and vegetables, whole grains, low-fat dairy products, and lean protein.  ? Avoiding foods with added fat, salt, and sugar.  · Drink plenty of water while you exercise to prevent dehydration or heat stroke.  Activity  · Choose an activity that you enjoy and set realistic goals. Your health care provider can help you make an exercise plan that works for you.  · Exercise at a moderate or vigorous intensity most days of the week.  ? The intensity of exercise may vary from person to person. You can tell how intense a workout is for you by paying attention to your breathing and heartbeat. Most people will notice their breathing and heartbeat get faster with more intense exercise.  · Do resistance training twice each week, such  as:  ? Push-ups.  ? Sit-ups.  ? Lifting weights.  ? Using resistance bands.  · Getting short amounts of exercise can be just as helpful as long structured periods of exercise. If you have trouble finding time to exercise, try to include exercise in your daily routine.  ? Get up, stretch, and walk around every 30 minutes throughout the day.  ? Go for a walk during your lunch break.  ? Park your car farther away from your destination.  ? If you take public transportation, get off one stop early and walk the rest of the way.  ? Make phone calls while standing up and walking around.  ? Take the stairs instead of elevators or escalators.  · Wear comfortable clothes and shoes with good support.  · Do not exercise so much that you hurt yourself, feel dizzy, or get very short of breath.  Where to find more information  · [place] Department of Health and Human Services: www.hhs.gov  · Centers for Disease Control and Prevention (CDC): www.cdc.gov  Contact a health care provider:  · Before starting a new exercise program.  · If you have questions or concerns about your weight.  · If you have a medical problem that keeps you from exercising.  Get help right away if you have any of the following while exercising:  · Injury.  · Dizziness.  · Difficulty breathing or shortness of breath that does not go away when you stop exercising.  · Chest pain.  · Rapid heartbeat.  Summary  · Being overweight increases your risk of heart disease, stroke, diabetes, high blood pressure, and several types of cancer.  · Losing weight happens when you burn more calories than you eat.  · Reducing the amount of calories you eat in addition to getting regular moderate or vigorous exercise each week helps you lose weight.  This information is not intended to replace advice given to you by your health care provider. Make sure you discuss any questions you have with your health care provider.  Document Released: 01/20/2012 Document Revised: 12/31/2018  Document Reviewed: 12/31/2018  [employer] Interactive Patient Education © 2019 [employer]      MyPlate from [employer]    MyPlate is an outline of a general healthy diet based on the 2010 Dietary Guidelines for Americans, from the U.[place] Department of Agriculture (USDA). It sets guidelines for how much food you should eat from each food group based on your age, sex, and level of physical activity.  What are tips for following MyPlate?  To follow MyPlate recommendations:  · Eat a wide variety of fruits and vegetables, grains, and protein foods.  · Serve smaller portions and eat less food throughout the day.  · Limit portion sizes to avoid overeating.  · Enjoy your food.  · Get at least 150 minutes of exercise every week. This is about 30 minutes each day, 5 or more days per week.  It can be difficult to have every meal look like MyPlate. Think about MyPlate as eating guidelines for an entire day, rather than each individual meal.  Fruits and vegetables  · Make half of your plate fruits and vegetables.  · Eat many different colors of fruits and vegetables each day.  · For a 2,000 calorie daily food plan, eat:  ? 2½ cups of vegetables every day.  ? 2 cups of fruit every day.  · 1 cup is equal to:  ? 1 cup raw or cooked vegetables.  ? 1 cup raw fruit.  ? 1 medium-sized orange, apple, or banana.  ? 1 cup 100% fruit or vegetable juice.  ? 2 cups raw leafy greens, such as lettuce, spinach, or kale.  ? ½ cup dried fruit.  Grains  · One fourth of your plate should be grains.  · Make at least half of the grains you eat each day whole grains.  · For a 2,000 calorie daily food plan, eat 6 oz of grains every day.  · 1 oz is equal to:  ? 1 slice bread.  ? 1 cup cereal.  ? ½ cup cooked rice, cereal, or pasta.  Protein  · One fourth of your plate should be protein.  · Eat a wide variety of protein foods, including meat, poultry, fish, eggs, beans, nuts, and tofu.  · For a 2,000 calorie daily food plan, eat 5½ oz of protein every day.  · 1  oz is equal to:  ? 1 oz meat, poultry, or fish.  ? ¼ cup cooked beans.  ? 1 egg.  ? ½ oz nuts or seeds.  ? 1 Tbsp peanut butter.  Dairy  · Drink fat-free or low-fat (1%) milk.  · Eat or drink dairy as a side to meals.  · For a 2,000 calorie daily food plan, eat or drink 3 cups of dairy every day.  · 1 cup is equal to:  ? 1 cup milk, yogurt, cottage cheese, or soy milk (soy beverage).  ? 2 oz processed cheese.  ? 1½ oz natural cheese.  Fats, oils, salt, and sugars  · Only small amounts of oils are recommended.  · Avoid foods that are high in calories and low in nutritional value (empty calories), like foods high in fat or added sugars.  · Choose foods that are low in salt (sodium). Choose foods that have less than 140 milligrams (mg) of sodium per serving.  · Drink water instead of sugary drinks. Drink enough water each day to keep your urine pale yellow.  Where to find support  · Work with your health care provider or a nutrition specialist (dietitian) to develop a customized eating plan that is right for you.  · Download an sally (mobile application) to help you track your daily food intake.  Where to find more information  · Go to ChooseMyPlate.gov for more information.  · Learn more and log your daily food intake according to MyPlate using USDA's SuperTracker: www.supertracker.usda.gov  Summary  · MyPlate is a general guideline for healthy eating from the USDA. It is based on the 2010 Dietary Guidelines for Americans.  · In general, fruits and vegetables should take up ½ of your plate, grains should take up ¼ of your plate, and protein should take up ¼ of your plate.  This information is not intended to replace advice given to you by your health care provider. Make sure you discuss any questions you have with your health care provider.  Document Released: 01/06/2009 Document Revised: 03/19/2018 Document Reviewed: 03/19/2018  [employer] Interactive Patient Education © 2019 [employer]

## 2020-01-22 NOTE — PROGRESS NOTES
Subjective   Charity Orr is a 49 y.o. female.     Chief Complaint   Patient presents with   • Pain     both legs   • Back Pain     lumbar   • sugar question       History of Present Illness   Complains of some low back pain and present for the last month.    The following portions of the patient's history were reviewed and updated as appropriate: allergies, current medications, past family history, past medical history, past social history, past surgical history and problem list.    Depression Screen: Patient with intellectual disability and answered best as possible.  PHQ-2/PHQ-9 Depression Screening 1/22/2020   Little interest or pleasure in doing things 0   Feeling down, depressed, or hopeless 0   Trouble falling or staying asleep, or sleeping too much 0   Feeling tired or having little energy 0   Poor appetite or overeating 0   Feeling bad about yourself - or that you are a failure or have let yourself or your family down 0   Trouble concentrating on things, such as reading the newspaper or watching television 0   Moving or speaking so slowly that other people could have noticed. Or the opposite - being so fidgety or restless that you have been moving around a lot more than usual 0   Thoughts that you would be better off dead, or of hurting yourself in some way 0   Total Score 0       Past Medical History:   Diagnosis Date   • Allergic rhinitis    • Back pain    • Dietary calcium deficiency    • Esophageal reflux    • Essential hypertriglyceridemia    • Flu vaccine need    • Foot pain, left    • GERD (gastroesophageal reflux disease)    • History of allergy    • Hyperlipidemia    • Hypertension    • Left foot pain    • Leukocytosis    • Lumbar muscle pain    • Mild mental retardation    • Mild mental retardation    • Pain    • Schizophrenic disorder (CMS/HCC)    • Soft tissue mass    • Sprain of ankle    • Tinea pedis    • Tremor due to multiple drugs    • UTI (urinary tract infection)        History  reviewed. No pertinent surgical history.    Family History   Problem Relation Age of Onset   • No Known Problems Mother        Social History     Socioeconomic History   • Marital status: Single     Spouse name: Not on file   • Number of children: Not on file   • Years of education: Not on file   • Highest education level: Not on file   Tobacco Use   • Smoking status: Never Smoker   • Smokeless tobacco: Never Used   Substance and Sexual Activity   • Alcohol use: No     Frequency: Never   • Drug use: No       Current Outpatient Medications   Medication Sig Dispense Refill   • amLODIPine (NORVASC) 5 MG tablet Take 1 tablet by mouth Daily. 30 tablet 11   • Ascorbic Acid 500 MG capsule Take  by mouth.     • benztropine (COGENTIN) 1 MG tablet Take 1 mg by mouth 2 (Two) Times a Day.  5   • calcium carbonate-vitamin d 600-400 MG-UNIT per tablet TAKE ONE TABLET BY MOUTH DAILY 30 tablet 11   • CEPACOL EXTRA STRENGTH 15-2.6 MG lozenge lozenge DISSOLVE ONE LOZENGE AS NEEDED  5   • chlorhexidine (PERIDEX) 0.12 % solution Brush teeth with one-half ounce in the morning and evening. 473 mL 11   • CORICIDIN 2-325 MG tablet TAKE ONE TABLET BY MOUTH EVERY 8 HOURS AS NEEDED 10 tablet 11   • divalproex (DEPAKOTE) 500 MG DR tablet Take 1,000 mg by mouth 2 (Two) Times a Day.  2   • docusate sodium (COLACE) 100 MG capsule Take  by mouth.     • fluticasone (FLONASE) 50 MCG/ACT nasal spray USE ONE SPRAY IN EACH nostil DAILY  11   • gemfibrozil (LOPID) 600 MG tablet Take 600 mg by mouth.     • haloperidol (HALDOL) 10 MG tablet Take 10 mg by mouth 2 (Two) Times a Day.  2   • loratadine (CLARITIN) 10 MG tablet Take 10 mg by mouth Daily.     • LORazepam (ATIVAN) 1 MG tablet TAKE ONE TABLET TWICE DAILY 60 tablet 2   • LORazepam (ATIVAN) 1 MG tablet TAKE ONE TABLET TWICE DAILY 60 tablet 1   • metoprolol succinate XL (TOPROL-XL) 50 MG 24 hr tablet Take 1 tablet by mouth Daily. 30 tablet 11   • MIDOL TEEN 500-25 MG tablet TAKE ONE TABLET BY MOUTH  EVERY 6 TO 8 HOURS AS NEEDED MENSTRUAL CRAMPS  11   • mirtazapine (REMERON) 7.5 MG tablet Take 1 tablet by mouth every night at bedtime. 30 tablet 5   • MULTIPLE VITAMINS-MINERALS ER PO Take  by mouth.     • multivitamin (THERAGRAN) tablet tablet Take 1 tablet by mouth Daily.  11   • norgestimate-ethinyl estradiol (ORTHO TRI-CYCLEN LO) 0.18/0.215/0.25 MG-25 MCG per tablet Take  by mouth.     • omeprazole (priLOSEC) 40 MG capsule TAKE ONE CAPSULE BY MOUTH DAILY 30 capsule 2   • perphenazine (TRILAFON) 8 MG tablet Take 8 mg by mouth.     • SF 5000 PLUS 1.1 % cream USE TO BRUSH TEETH ONCE DAILY 51 g 5   • traZODone (DESYREL) 50 MG tablet Take 50 mg by mouth every night at bedtime.  2   • vitamin C (ASCORBIC ACID) 500 MG tablet Take 500 mg by mouth Daily.       No current facility-administered medications for this visit.        Review of Systems   Constitutional: Negative for activity change, appetite change, fatigue, fever, unexpected weight gain and unexpected weight loss.   HENT: Negative for nosebleeds, rhinorrhea, trouble swallowing and voice change.    Eyes: Negative for visual disturbance.   Respiratory: Negative for cough, chest tightness, shortness of breath and wheezing.    Cardiovascular: Negative for chest pain, palpitations and leg swelling.   Gastrointestinal: Negative for abdominal pain, blood in stool, constipation, diarrhea, nausea, vomiting, GERD and indigestion.   Genitourinary: Negative for dysuria, frequency and hematuria.   Musculoskeletal: Negative for arthralgias, back pain and myalgias.   Skin: Negative for rash and bruise.   Neurological: Negative for dizziness, tremors, weakness, light-headedness, numbness, headache and memory problem.   Hematological: Negative for adenopathy. Does not bruise/bleed easily.   Psychiatric/Behavioral: Negative for sleep disturbance and depressed mood. The patient is not nervous/anxious.        Objective   /70 (BP Location: Left arm, Patient Position:  "Sitting, Cuff Size: Adult)   Pulse 80   Temp 97.6 °F (36.4 °C)   Ht 157.5 cm (62.01\")   Wt 91.6 kg (202 lb)   LMP  (LMP Unknown)   SpO2 95%   BMI 36.94 kg/m²     Physical Exam   Constitutional: She is oriented to person, place, and time. She appears well-developed and well-nourished. No distress.   HENT:   Head: Normocephalic and atraumatic.   Right Ear: External ear normal.   Left Ear: External ear normal.   Nose: Nose normal.   Mouth/Throat: Oropharynx is clear and moist.   Eyes: Pupils are equal, round, and reactive to light. Conjunctivae and EOM are normal.   Neck: Normal range of motion. Neck supple. No tracheal deviation present. No thyromegaly present.   Cardiovascular: Normal rate, regular rhythm, normal heart sounds and intact distal pulses. Exam reveals no gallop and no friction rub.   No murmur heard.  Pulmonary/Chest: Effort normal and breath sounds normal. No respiratory distress.   Abdominal: Soft. Bowel sounds are normal. She exhibits no mass. There is no tenderness. There is no guarding.   Musculoskeletal: Normal range of motion. She exhibits no edema.   Lymphadenopathy:     She has no cervical adenopathy.   Neurological: She is alert and oriented to person, place, and time. She displays normal reflexes. She exhibits normal muscle tone.   Skin: Skin is warm and dry. Capillary refill takes less than 2 seconds. No rash noted. She is not diaphoretic.   Psychiatric: She has a normal mood and affect. Her behavior is normal. Judgment and thought content normal.   Nursing note and vitals reviewed.      No results found for this or any previous visit (from the past 2016 hour(s)).  Assessment/Plan   Charity was seen today for pain, back pain and sugar question.    Diagnoses and all orders for this visit:    Strain of lumbar region, initial encounter    Class 2 obesity without serious comorbidity with body mass index (BMI) of 36.0 to 36.9 in adult, unspecified obesity type      No evidence of diabetes by " past labs with a blood sugar of 100 only.  Stretching exercises of the back and exercise with some weight loss.

## 2020-01-28 RX ORDER — OMEPRAZOLE 40 MG/1
CAPSULE, DELAYED RELEASE ORAL
Qty: 30 CAPSULE | Refills: 2 | Status: SHIPPED | OUTPATIENT
Start: 2020-01-28 | End: 2020-04-20

## 2020-02-24 DIAGNOSIS — F20.9 SCHIZOPHRENIC DISORDER (HCC): Primary | ICD-10-CM

## 2020-02-24 RX ORDER — LORAZEPAM 1 MG/1
TABLET ORAL
Qty: 60 TABLET | Refills: 2 | Status: SHIPPED | OUTPATIENT
Start: 2020-02-24 | End: 2020-05-19

## 2020-02-26 RX ORDER — LORAZEPAM 1 MG/1
TABLET ORAL
Qty: 60 TABLET | Refills: 2 | OUTPATIENT
Start: 2020-02-26

## 2020-03-23 RX ORDER — MIRTAZAPINE 7.5 MG/1
TABLET, FILM COATED ORAL
Qty: 30 TABLET | Refills: 5 | Status: SHIPPED | OUTPATIENT
Start: 2020-03-23

## 2020-04-20 RX ORDER — OMEPRAZOLE 40 MG/1
CAPSULE, DELAYED RELEASE ORAL
Qty: 30 CAPSULE | Refills: 2 | Status: SHIPPED | OUTPATIENT
Start: 2020-04-20 | End: 2020-08-11 | Stop reason: SDUPTHER

## 2020-05-18 DIAGNOSIS — F20.9 SCHIZOPHRENIC DISORDER (HCC): ICD-10-CM

## 2020-05-18 RX ORDER — MULTIVITAMIN
TABLET ORAL
Qty: 30 TABLET | Refills: 11 | Status: SHIPPED | OUTPATIENT
Start: 2020-05-18 | End: 2021-08-04

## 2020-05-18 RX ORDER — DOCUSATE SODIUM 100 MG
CAPSULE ORAL
Qty: 30 CAPSULE | Refills: 11 | Status: SHIPPED | OUTPATIENT
Start: 2020-05-18 | End: 2021-08-04

## 2020-05-18 RX ORDER — ASCORBIC ACID 500 MG
TABLET ORAL
Qty: 30 TABLET | Refills: 11 | Status: SHIPPED | OUTPATIENT
Start: 2020-05-18 | End: 2021-08-04

## 2020-05-18 RX ORDER — NORGESTIMATE AND ETHINYL ESTRADIOL 7DAYSX3 28
KIT ORAL
Qty: 28 TABLET | Refills: 11 | Status: SHIPPED | OUTPATIENT
Start: 2020-05-18 | End: 2021-04-15

## 2020-05-19 RX ORDER — LORAZEPAM 1 MG/1
TABLET ORAL
Qty: 60 TABLET | Refills: 1 | Status: SHIPPED | OUTPATIENT
Start: 2020-05-19 | End: 2020-07-29 | Stop reason: SDUPTHER

## 2020-06-15 RX ORDER — METOPROLOL SUCCINATE 50 MG/1
TABLET, EXTENDED RELEASE ORAL
Qty: 30 TABLET | Refills: 11 | Status: SHIPPED | OUTPATIENT
Start: 2020-06-15 | End: 2021-05-13

## 2020-06-24 RX ORDER — FLUTICASONE PROPIONATE 50 MCG
SPRAY, SUSPENSION (ML) NASAL
Qty: 16 G | Refills: 11 | Status: SHIPPED | OUTPATIENT
Start: 2020-06-24 | End: 2021-08-02

## 2020-07-29 ENCOUNTER — OFFICE VISIT (OUTPATIENT)
Dept: FAMILY MEDICINE CLINIC | Facility: CLINIC | Age: 50
End: 2020-07-29

## 2020-07-29 VITALS
HEIGHT: 62 IN | SYSTOLIC BLOOD PRESSURE: 118 MMHG | WEIGHT: 203 LBS | TEMPERATURE: 98.9 F | OXYGEN SATURATION: 96 % | DIASTOLIC BLOOD PRESSURE: 82 MMHG | BODY MASS INDEX: 37.36 KG/M2 | HEART RATE: 76 BPM

## 2020-07-29 DIAGNOSIS — K21.9 GASTROESOPHAGEAL REFLUX DISEASE WITHOUT ESOPHAGITIS: ICD-10-CM

## 2020-07-29 DIAGNOSIS — Z00.00 ANNUAL PHYSICAL EXAM: Primary | ICD-10-CM

## 2020-07-29 DIAGNOSIS — E78.1 ESSENTIAL HYPERTRIGLYCERIDEMIA: ICD-10-CM

## 2020-07-29 DIAGNOSIS — E78.2 MIXED HYPERLIPIDEMIA: ICD-10-CM

## 2020-07-29 DIAGNOSIS — I10 ESSENTIAL HYPERTENSION: ICD-10-CM

## 2020-07-29 PROCEDURE — 99396 PREV VISIT EST AGE 40-64: CPT | Performed by: INTERNAL MEDICINE

## 2020-08-10 DIAGNOSIS — F20.9 SCHIZOPHRENIC DISORDER (HCC): Primary | ICD-10-CM

## 2020-08-10 DIAGNOSIS — F25.9 SCHIZOAFFECTIVE DISORDER, UNSPECIFIED TYPE (HCC): ICD-10-CM

## 2020-08-10 RX ORDER — LORAZEPAM 1 MG/1
TABLET ORAL
Qty: 64 TABLET | Refills: 2 | Status: SHIPPED | OUTPATIENT
Start: 2020-08-10 | End: 2020-10-01

## 2020-08-10 RX ORDER — AMLODIPINE BESYLATE 5 MG/1
TABLET ORAL
Qty: 52 TABLET | Refills: 0 | Status: SHIPPED | OUTPATIENT
Start: 2020-08-10 | End: 2021-07-12

## 2020-08-11 RX ORDER — OMEPRAZOLE 40 MG/1
40 CAPSULE, DELAYED RELEASE ORAL DAILY
Qty: 30 CAPSULE | Refills: 2 | Status: SHIPPED | OUTPATIENT
Start: 2020-08-11 | End: 2020-10-28 | Stop reason: SDUPTHER

## 2020-10-01 DIAGNOSIS — F20.9 SCHIZOPHRENIC DISORDER (HCC): ICD-10-CM

## 2020-10-01 DIAGNOSIS — F25.9 SCHIZOAFFECTIVE DISORDER, UNSPECIFIED TYPE (HCC): ICD-10-CM

## 2020-10-01 RX ORDER — LORAZEPAM 1 MG/1
TABLET ORAL
Qty: 64 TABLET | Refills: 1 | Status: SHIPPED | OUTPATIENT
Start: 2020-10-01 | End: 2020-11-23

## 2020-10-28 RX ORDER — OMEPRAZOLE 40 MG/1
40 CAPSULE, DELAYED RELEASE ORAL DAILY
Qty: 30 CAPSULE | Refills: 2 | Status: SHIPPED | OUTPATIENT
Start: 2020-10-28 | End: 2021-10-04

## 2020-11-03 ENCOUNTER — OFFICE VISIT (OUTPATIENT)
Dept: FAMILY MEDICINE CLINIC | Facility: CLINIC | Age: 50
End: 2020-11-03

## 2020-11-03 VITALS
TEMPERATURE: 98.6 F | WEIGHT: 205 LBS | HEIGHT: 62 IN | HEART RATE: 75 BPM | BODY MASS INDEX: 37.73 KG/M2 | OXYGEN SATURATION: 96 % | DIASTOLIC BLOOD PRESSURE: 78 MMHG | SYSTOLIC BLOOD PRESSURE: 118 MMHG

## 2020-11-03 DIAGNOSIS — I10 ESSENTIAL HYPERTENSION: Primary | ICD-10-CM

## 2020-11-03 DIAGNOSIS — R30.0 DYSURIA: ICD-10-CM

## 2020-11-03 LAB
BILIRUB BLD-MCNC: NEGATIVE MG/DL
CLARITY, POC: CLEAR
COLOR UR: ABNORMAL
GLUCOSE UR STRIP-MCNC: NEGATIVE MG/DL
KETONES UR QL: ABNORMAL
LEUKOCYTE EST, POC: NEGATIVE
NITRITE UR-MCNC: NEGATIVE MG/ML
PH UR: 6 [PH] (ref 5–8)
PROT UR STRIP-MCNC: ABNORMAL MG/DL
RBC # UR STRIP: ABNORMAL /UL
SP GR UR: 1.03 (ref 1–1.03)
UROBILINOGEN UR QL: NORMAL

## 2020-11-03 PROCEDURE — 90686 IIV4 VACC NO PRSV 0.5 ML IM: CPT | Performed by: INTERNAL MEDICINE

## 2020-11-03 PROCEDURE — 99214 OFFICE O/P EST MOD 30 MIN: CPT | Performed by: INTERNAL MEDICINE

## 2020-11-03 PROCEDURE — 90471 IMMUNIZATION ADMIN: CPT | Performed by: INTERNAL MEDICINE

## 2020-11-03 NOTE — PROGRESS NOTES
"Subjective   Charity Orr is a 50 y.o. female.     Chief Complaint   Patient presents with   • Flu Vaccine   • Hypertension     protocol       History of Present Illness   Caregiver Garcia from the group home was present during the history-taking and subsequent discussion (and for part of the physical exam) with this patient.  Patient agrees to the presence of the individual during this visit.    Follow-up for hypertension.  Currently has been feeling well and asymptomatic without any headaches,vision changes, cough, chest pain, shortness of breath, swelling, focal neurologic deficit, memory loss or syncope.  Has been taking the medications regularly and adherent with the regimen of amlodipine 5 mg and metoprolol succinate XL 50 mg.  Denies medication side effects and no significant interval events.      States has been urinating more often and some discomfort \"in my personal area\".  Denies any traumas or injuries to this area.  She states it may hurt when she goes the bathroom but it may not in the same for any urinary frequency.  Is difficult to get a proper history from the patient.    The following portions of the patient's history were reviewed and updated as appropriate: allergies, current medications, past family history, past medical history, past social history, past surgical history and problem list.    Depression Screen:  PHQ-2/PHQ-9 Depression Screening 1/22/2020   Little interest or pleasure in doing things 0   Feeling down, depressed, or hopeless 0   Trouble falling or staying asleep, or sleeping too much 0   Feeling tired or having little energy 0   Poor appetite or overeating 0   Feeling bad about yourself - or that you are a failure or have let yourself or your family down 0   Trouble concentrating on things, such as reading the newspaper or watching television 0   Moving or speaking so slowly that other people could have noticed. Or the opposite - being so fidgety or restless that you have been " moving around a lot more than usual 0   Thoughts that you would be better off dead, or of hurting yourself in some way 0   Total Score 0       Past Medical History:   Diagnosis Date   • Allergic rhinitis    • Back pain    • Dietary calcium deficiency    • Esophageal reflux    • Essential hypertriglyceridemia    • Flu vaccine need    • Foot pain, left    • GERD (gastroesophageal reflux disease)    • History of allergy    • Hyperlipidemia    • Hypertension    • Left foot pain    • Leukocytosis    • Lumbar muscle pain    • Mild mental retardation    • Mild mental retardation    • Pain    • Schizophrenic disorder (CMS/HCC)    • Soft tissue mass    • Sprain of ankle    • Tinea pedis    • Tremor due to multiple drugs    • UTI (urinary tract infection)        History reviewed. No pertinent surgical history.    Family History   Problem Relation Age of Onset   • No Known Problems Mother        Social History     Socioeconomic History   • Marital status: Single     Spouse name: Not on file   • Number of children: Not on file   • Years of education: Not on file   • Highest education level: Not on file   Tobacco Use   • Smoking status: Never Smoker   • Smokeless tobacco: Never Used   Substance and Sexual Activity   • Alcohol use: No     Frequency: Never   • Drug use: No       Current Outpatient Medications   Medication Sig Dispense Refill   • amLODIPine (NORVASC) 5 MG tablet TAKE ONE TABLET BY MOUTH DAILY 52 tablet 0   • Ascorbic Acid 500 MG capsule Take  by mouth.     • benztropine (COGENTIN) 1 MG tablet Take 1 mg by mouth 2 (Two) Times a Day.  5   • calcium carbonate-vitamin d 600-400 MG-UNIT per tablet TAKE ONE TABLET BY MOUTH DAILY 30 tablet 11   • CEPACOL EXTRA STRENGTH 15-2.6 MG lozenge lozenge DISSOLVE ONE LOZENGE AS NEEDED  5   • chlorhexidine (PERIDEX) 0.12 % solution Brush teeth with one-half ounce in the morning and evening. 473 mL 11   • CORICIDIN 2-325 MG tablet TAKE ONE TABLET BY MOUTH EVERY 8 HOURS AS NEEDED 10  tablet 11   • divalproex (DEPAKOTE) 500 MG DR tablet Take 1,000 mg by mouth 2 (Two) Times a Day.  2   • fluticasone (FLONASE) 50 MCG/ACT nasal spray INSTILL ONE SPRAY IN EACH NOSTRIL DAILY 16 g 11   • gemfibrozil (LOPID) 600 MG tablet Take 600 mg by mouth.     • haloperidol (HALDOL) 10 MG tablet Take 10 mg by mouth 2 (Two) Times a Day.  2   • loratadine (CLARITIN) 10 MG tablet Take 10 mg by mouth Daily.     • LORazepam (ATIVAN) 1 MG tablet TAKE ONE TABLET BY MOUTH TWICE DAILY 64 tablet 1   • metoprolol succinate XL (TOPROL-XL) 50 MG 24 hr tablet TAKE ONE TABLET BY MOUTH DAILY 30 tablet 11   • MIDOL TEEN 500-25 MG tablet TAKE ONE TABLET BY MOUTH EVERY 6 TO 8 HOURS AS NEEDED MENSTRUAL CRAMPS  11   • mirtazapine (REMERON) 7.5 MG tablet TAKE ONE TABLET BY MOUTH AT BEDTIME 30 tablet 5   • Multiple Vitamin (MULTIVITAMIN) tablet TAKE ONE TABLET DAILY 30 tablet 11   • norgestimate-ethinyl estradiol (ORTHO TRI-CYCLEN LO) 0.18/0.215/0.25 MG-25 MCG per tablet Take  by mouth.     • omeprazole (priLOSEC) 40 MG capsule Take 1 capsule by mouth Daily. 30 capsule 2   • perphenazine (TRILAFON) 8 MG tablet Take 8 mg by mouth.     • SF 5000 PLUS 1.1 % cream USE TO BRUSH TEETH ONCE DAILY 51 g 5   • STOOL SOFTENER 100 MG capsule TAKE ONE CAPSULE DAILY 30 capsule 11   • traZODone (DESYREL) 50 MG tablet Take 50 mg by mouth every night at bedtime.  2   • TRI-SPRINTEC 0.18/0.215/0.25 MG-35 MCG per tablet TAKE ONE TABLET DAILY 28 tablet 11   • vitamin C (ASCORBIC ACID) 500 MG tablet TAKE ONE TABLET DAILY 30 tablet 11     No current facility-administered medications for this visit.        Review of Systems   Constitutional: Negative for activity change, appetite change, fatigue, fever, unexpected weight gain and unexpected weight loss.        Obese abdomen   HENT: Negative for nosebleeds, rhinorrhea, trouble swallowing and voice change.    Eyes: Negative for visual disturbance.   Respiratory: Negative for cough, chest tightness, shortness of  "breath and wheezing.    Cardiovascular: Negative for chest pain, palpitations and leg swelling.   Gastrointestinal: Negative for abdominal pain, blood in stool, constipation, diarrhea, nausea, vomiting, GERD and indigestion.   Genitourinary: Positive for dysuria and frequency. Negative for hematuria.        Currently on her menstrual cycle   Musculoskeletal: Negative for arthralgias, back pain and myalgias.   Skin: Negative for rash and bruise.   Neurological: Negative for dizziness, tremors, weakness, light-headedness, numbness, headache and memory problem.   Hematological: Negative for adenopathy. Does not bruise/bleed easily.   Psychiatric/Behavioral: Negative for sleep disturbance and depressed mood. The patient is not nervous/anxious.        Objective   /78 (BP Location: Left arm, Patient Position: Sitting, Cuff Size: Adult)   Pulse 75   Temp 98.6 °F (37 °C) (Temporal)   Ht 157.5 cm (62.01\")   Wt 93 kg (205 lb)   SpO2 96%   BMI 37.49 kg/m²     Physical Exam  Vitals signs and nursing note reviewed.   Constitutional:       General: She is not in acute distress.     Appearance: She is well-developed. She is not diaphoretic.   HENT:      Head: Normocephalic and atraumatic.      Right Ear: External ear normal.      Left Ear: External ear normal.      Nose: Nose normal.   Eyes:      Conjunctiva/sclera: Conjunctivae normal.      Pupils: Pupils are equal, round, and reactive to light.   Neck:      Musculoskeletal: Normal range of motion and neck supple.      Thyroid: No thyromegaly.      Trachea: No tracheal deviation.   Cardiovascular:      Rate and Rhythm: Normal rate and regular rhythm.      Heart sounds: Normal heart sounds. No murmur. No friction rub. No gallop.    Pulmonary:      Effort: Pulmonary effort is normal. No respiratory distress.      Breath sounds: Normal breath sounds.   Abdominal:      General: Bowel sounds are normal.      Palpations: Abdomen is soft. There is no mass.      Tenderness: " There is no abdominal tenderness. There is no guarding.      Comments: Obese abdomen but nontender   Musculoskeletal: Normal range of motion.   Lymphadenopathy:      Cervical: No cervical adenopathy.   Skin:     General: Skin is warm and dry.      Capillary Refill: Capillary refill takes less than 2 seconds.      Findings: No rash.   Neurological:      Mental Status: She is alert and oriented to person, place, and time.      Motor: No abnormal muscle tone.      Deep Tendon Reflexes: Reflexes normal.   Psychiatric:         Behavior: Behavior normal.         Thought Content: Thought content normal.         Judgment: Judgment normal.         Recent Results (from the past 2016 hour(s))   POC Urinalysis Dipstick, Automated    Collection Time: 11/03/20 12:18 PM    Specimen: Urine   Result Value Ref Range    Color Straw Yellow, Straw, Dark Yellow, Monse    Clarity, UA Clear Clear    Specific Gravity  1.030 1.005 - 1.030    pH, Urine 6.0 5.0 - 8.0    Leukocytes Negative Negative    Nitrite, UA Negative Negative    Protein, POC Trace (A) Negative mg/dL    Glucose, UA Negative Negative, 1000 mg/dL (3+) mg/dL    Ketones, UA Trace (A) Negative    Urobilinogen, UA Normal Normal    Bilirubin Negative Negative    Blood, UA 3+ (A) Negative     Assessment/Plan   Diagnoses and all orders for this visit:    1. Essential hypertension (Primary)    2. Dysuria  -     POC Urinalysis Dipstick, Automated  -     Urine Culture - Urine, Urine, Clean Catch    Other orders  -     FluLaval Quad >6 Months ([id number])    Hypertension is well controlled.  No changes are needed will continue the current medications unchanged.  Some dysuria was noted but unsure if this is a true difficulty or is related to hurt the other psychiatric issues.  We will check the urinalysis and urine culture.  Flu shot to be given today.

## 2020-11-23 DIAGNOSIS — F20.9 SCHIZOPHRENIC DISORDER (HCC): ICD-10-CM

## 2020-11-23 DIAGNOSIS — F25.9 SCHIZOAFFECTIVE DISORDER, UNSPECIFIED TYPE (HCC): ICD-10-CM

## 2020-11-23 RX ORDER — LORAZEPAM 1 MG/1
TABLET ORAL
Qty: 64 TABLET | Refills: 1 | Status: SHIPPED | OUTPATIENT
Start: 2020-11-23 | End: 2021-01-21

## 2020-11-25 LAB
BACTERIA UR CULT: NORMAL
BACTERIA UR CULT: NORMAL

## 2021-01-05 ENCOUNTER — OFFICE VISIT (OUTPATIENT)
Dept: FAMILY MEDICINE CLINIC | Facility: CLINIC | Age: 51
End: 2021-01-05

## 2021-01-05 VITALS
SYSTOLIC BLOOD PRESSURE: 122 MMHG | HEIGHT: 62 IN | DIASTOLIC BLOOD PRESSURE: 78 MMHG | OXYGEN SATURATION: 96 % | WEIGHT: 206 LBS | BODY MASS INDEX: 37.91 KG/M2 | HEART RATE: 70 BPM | TEMPERATURE: 98 F

## 2021-01-05 DIAGNOSIS — J01.00 ACUTE NON-RECURRENT MAXILLARY SINUSITIS: Primary | ICD-10-CM

## 2021-01-05 PROCEDURE — 99213 OFFICE O/P EST LOW 20 MIN: CPT | Performed by: FAMILY MEDICINE

## 2021-01-05 RX ORDER — AZITHROMYCIN 250 MG/1
TABLET, FILM COATED ORAL
Qty: 6 TABLET | Refills: 0 | Status: SHIPPED | OUTPATIENT
Start: 2021-01-05 | End: 2021-04-15

## 2021-01-21 DIAGNOSIS — F20.9 SCHIZOPHRENIC DISORDER (HCC): ICD-10-CM

## 2021-01-21 DIAGNOSIS — F25.9 SCHIZOAFFECTIVE DISORDER, UNSPECIFIED TYPE (HCC): ICD-10-CM

## 2021-01-21 RX ORDER — LORAZEPAM 1 MG/1
TABLET ORAL
Qty: 64 TABLET | Refills: 2 | Status: SHIPPED | OUTPATIENT
Start: 2021-01-21 | End: 2021-04-29 | Stop reason: SDUPTHER

## 2021-03-08 ENCOUNTER — OFFICE VISIT (OUTPATIENT)
Dept: FAMILY MEDICINE CLINIC | Facility: CLINIC | Age: 51
End: 2021-03-08

## 2021-03-08 VITALS
TEMPERATURE: 97.8 F | DIASTOLIC BLOOD PRESSURE: 70 MMHG | HEIGHT: 62 IN | BODY MASS INDEX: 36.91 KG/M2 | SYSTOLIC BLOOD PRESSURE: 118 MMHG | HEART RATE: 76 BPM | WEIGHT: 200.6 LBS | OXYGEN SATURATION: 95 %

## 2021-03-08 DIAGNOSIS — J30.1 SEASONAL ALLERGIC RHINITIS DUE TO POLLEN: Primary | ICD-10-CM

## 2021-03-08 PROCEDURE — 99213 OFFICE O/P EST LOW 20 MIN: CPT | Performed by: FAMILY MEDICINE

## 2021-03-08 NOTE — PROGRESS NOTES
"Chief Complaint  Earache (C/o earache on right side )    Subjective          Charity Orr presents to [facility]  History of Present Illness  PT has been having rt ear pain for about 3 weeks.  No fever or chills.  No cold symptoms.  No cough or congestion.  She is here with caregiver who is helping with questions.    Objective   Vital Signs:   /70   Pulse 76   Temp 97.8 °F (36.6 °C)   Ht 157.5 cm (62.01\")   Wt 91 kg (200 lb 9.6 oz)   SpO2 95%   BMI 36.68 kg/m²     Physical Exam  Vitals and nursing note reviewed.   Constitutional:       Appearance: Normal appearance.   HENT:      Head: Normocephalic and atraumatic.      Right Ear: Tympanic membrane, ear canal and external ear normal. There is no impacted cerumen.      Left Ear: Tympanic membrane, ear canal and external ear normal. There is no impacted cerumen.      Nose: No congestion or rhinorrhea.      Mouth/Throat:      Comments: Post nasal drip.  Cardiovascular:      Rate and Rhythm: Normal rate and regular rhythm.      Heart sounds: Normal heart sounds. No murmur. No friction rub.   Pulmonary:      Effort: Pulmonary effort is normal. No respiratory distress.      Breath sounds: Normal breath sounds. No stridor. No wheezing or rhonchi.   Neurological:      Mental Status: She is alert.        Result Review :                 Assessment and Plan    Diagnoses and all orders for this visit:    1. Seasonal allergic rhinitis due to pollen (Primary)        Follow Up   No follow-ups on file.  Patient was given instructions and counseling regarding her condition or for health maintenance advice. Please see specific information pulled into the AVS if appropriate.   Continue claritin and flonase and no better will stop claritin and start zyrtec.    "

## 2021-04-15 ENCOUNTER — OFFICE VISIT (OUTPATIENT)
Dept: FAMILY MEDICINE CLINIC | Facility: CLINIC | Age: 51
End: 2021-04-15

## 2021-04-15 VITALS
WEIGHT: 207 LBS | OXYGEN SATURATION: 94 % | DIASTOLIC BLOOD PRESSURE: 79 MMHG | BODY MASS INDEX: 37.85 KG/M2 | HEART RATE: 90 BPM | TEMPERATURE: 97.7 F | SYSTOLIC BLOOD PRESSURE: 146 MMHG

## 2021-04-15 DIAGNOSIS — H65.91 FLUID LEVEL BEHIND TYMPANIC MEMBRANE OF RIGHT EAR: Primary | ICD-10-CM

## 2021-04-15 PROCEDURE — 99213 OFFICE O/P EST LOW 20 MIN: CPT | Performed by: NURSE PRACTITIONER

## 2021-04-15 RX ORDER — CETIRIZINE HYDROCHLORIDE 10 MG/1
10 TABLET ORAL DAILY
Qty: 90 TABLET | Refills: 0 | Status: SHIPPED | OUTPATIENT
Start: 2021-04-15 | End: 2021-06-08 | Stop reason: SDUPTHER

## 2021-04-15 RX ORDER — PREDNISONE 20 MG/1
40 TABLET ORAL DAILY
Qty: 10 TABLET | Refills: 0 | Status: SHIPPED | OUTPATIENT
Start: 2021-04-15 | End: 2021-04-20

## 2021-04-15 NOTE — PROGRESS NOTES
Chief Complaint  Pain (pain right ear)  History obtained from patient and caregiver.   Subjective          Charity Orr presents to [facility]  Earache   There is pain in the right ear. This is a recurrent problem. The current episode started more than 1 month ago. There has been no fever. Pertinent negatives include no coughing, ear discharge, hearing loss, rhinorrhea or sore throat. Treatments tried: azithromycin, Claritin and Flonase.  The treatment provided no relief.       Objective   Vital Signs:   /79   Pulse 90   Temp 97.7 °F (36.5 °C) (Temporal)   Wt 93.9 kg (207 lb)   SpO2 94%   BMI 37.85 kg/m²     Physical Exam  Vitals and nursing note reviewed.   Constitutional:       Appearance: Normal appearance.   HENT:      Right Ear: Ear canal normal. A middle ear effusion is present.      Left Ear: Tympanic membrane and ear canal normal.   Cardiovascular:      Rate and Rhythm: Normal rate and regular rhythm.      Heart sounds: Normal heart sounds.   Pulmonary:      Effort: Pulmonary effort is normal.      Breath sounds: Normal breath sounds.   Neurological:      Mental Status: She is alert. Mental status is at baseline.   Psychiatric:         Mood and Affect: Mood normal.        Result Review :            Assessment and Plan    Diagnoses and all orders for this visit:    1. Fluid level behind tympanic membrane of right ear (Primary)  Comments:  - Will stop Claritin and switch to Zyrtec.   - May consider ENT referral if symptoms persist or worsen.   Orders:  -     predniSONE (DELTASONE) 20 MG tablet; Take 2 tablets by mouth Daily for 5 days.  Dispense: 10 tablet; Refill: 0  -     cetirizine (zyrTEC) 10 MG tablet; Take 1 tablet by mouth Daily.  Dispense: 90 tablet; Refill: 0      I spent 20 minutes caring for Charity on this date of service. This time includes time spent by me in the following activities:performing a medically appropriate examination and/or evaluation ,  counseling and educating the patient/family/caregiver, ordering medications, tests, or procedures and documenting information in the medical record  Follow Up   No follow-ups on file.  Patient was given instructions and counseling regarding her condition or for health maintenance advice. Please see specific information pulled into the AVS if appropriate.

## 2021-04-29 DIAGNOSIS — F20.9 SCHIZOPHRENIC DISORDER (HCC): ICD-10-CM

## 2021-04-29 DIAGNOSIS — F25.9 SCHIZOAFFECTIVE DISORDER, UNSPECIFIED TYPE (HCC): ICD-10-CM

## 2021-04-29 RX ORDER — LORAZEPAM 1 MG/1
1 TABLET ORAL 2 TIMES DAILY
Qty: 64 TABLET | Refills: 2 | Status: SHIPPED | OUTPATIENT
Start: 2021-04-29 | End: 2021-07-15

## 2021-05-12 RX ORDER — BENZTROPINE MESYLATE 1 MG/1
1 TABLET ORAL 2 TIMES DAILY
Qty: 180 TABLET | Refills: 0 | Status: SHIPPED | OUTPATIENT
Start: 2021-05-12

## 2021-05-13 RX ORDER — NORGESTIMATE AND ETHINYL ESTRADIOL 7DAYSX3 28
KIT ORAL
Qty: 28 TABLET | Refills: 11 | Status: SHIPPED | OUTPATIENT
Start: 2021-05-13 | End: 2022-04-04

## 2021-05-13 RX ORDER — METOPROLOL SUCCINATE 50 MG/1
50 TABLET, EXTENDED RELEASE ORAL DAILY
Qty: 30 TABLET | Refills: 11 | Status: SHIPPED | OUTPATIENT
Start: 2021-05-13 | End: 2022-03-23

## 2021-05-13 NOTE — TELEPHONE ENCOUNTER
LOV 4/15/21  sick  NOV Past due 1/29/21 recheck  Labs 11/23/20  Last RF 6/15/20    From her annual exam visit in 7/2020, Dr. Rodriguez wanted her to recheck in 1/21; spoke with her coordinator and she said she has been seen by 2 other providers already this year (those were all sick visits)  She said she would call back later, she was in a DrFabricio appt.

## 2021-06-08 ENCOUNTER — OFFICE VISIT (OUTPATIENT)
Dept: FAMILY MEDICINE CLINIC | Facility: CLINIC | Age: 51
End: 2021-06-08

## 2021-06-08 VITALS
WEIGHT: 209 LBS | BODY MASS INDEX: 38.46 KG/M2 | SYSTOLIC BLOOD PRESSURE: 122 MMHG | HEIGHT: 62 IN | OXYGEN SATURATION: 98 % | DIASTOLIC BLOOD PRESSURE: 88 MMHG | TEMPERATURE: 97.8 F | HEART RATE: 80 BPM

## 2021-06-08 DIAGNOSIS — H65.91 FLUID LEVEL BEHIND TYMPANIC MEMBRANE OF RIGHT EAR: ICD-10-CM

## 2021-06-08 DIAGNOSIS — F20.9 SCHIZOPHRENIC DISORDER (HCC): ICD-10-CM

## 2021-06-08 DIAGNOSIS — I10 ESSENTIAL HYPERTENSION: Primary | ICD-10-CM

## 2021-06-08 PROCEDURE — 99213 OFFICE O/P EST LOW 20 MIN: CPT | Performed by: INTERNAL MEDICINE

## 2021-06-08 RX ORDER — CETIRIZINE HYDROCHLORIDE 10 MG/1
10 TABLET ORAL DAILY
Qty: 90 TABLET | Refills: 3 | Status: SHIPPED | OUTPATIENT
Start: 2021-06-08 | End: 2022-04-18

## 2021-06-08 NOTE — PROGRESS NOTES
Subjective   Charity Orr is a 50 y.o. female.     Chief Complaint   Patient presents with   • Hypertension       History of Present Illness   Caregiver, Jose, was present during the history-taking and subsequent discussion (and for part of the physical exam) with this patient.  Patient agrees to the presence of the individual during this visit.    Follow-up for hypertension.  Currently has been feeling well and asymptomatic without any headaches,vision changes, cough, chest pain, shortness of breath, swelling, focal neurologic deficit, memory loss or syncope.  Has been taking the medications regularly and adherent with the regimen of amlodipine 5 mg and metoprolol succinate XL 50 mg.  Denies medication side effects and no significant interval events.       Per caregiver no other issues have been going on with good bowel movements and activities.  She is to be seeing [facility] [id number] for follow-up for psychiatric care.  The following portions of the patient's history were reviewed and updated as appropriate: allergies, current medications, past family history, past medical history, past social history, past surgical history and problem list.    Depression Screen:  PHQ-2/PHQ-9 Depression Screening 1/5/2021   Little interest or pleasure in doing things 0   Feeling down, depressed, or hopeless 0   Trouble falling or staying asleep, or sleeping too much -   Feeling tired or having little energy -   Poor appetite or overeating -   Feeling bad about yourself - or that you are a failure or have let yourself or your family down -   Trouble concentrating on things, such as reading the newspaper or watching television -   Moving or speaking so slowly that other people could have noticed. Or the opposite - being so fidgety or restless that you have been moving around a lot more than usual -   Thoughts that you would be better off dead, or of hurting yourself in some way -   Total Score 0       Past Medical History:    Diagnosis Date   • Allergic rhinitis    • Back pain    • Dietary calcium deficiency    • Esophageal reflux    • Essential hypertriglyceridemia    • Flu vaccine need    • Foot pain, left    • GERD (gastroesophageal reflux disease)    • History of allergy    • Hyperlipidemia    • Hypertension    • Left foot pain    • Leukocytosis    • Lumbar muscle pain    • Mild mental retardation    • Mild mental retardation    • Pain    • Schizophrenic disorder (CMS/HCC)    • Soft tissue mass    • Sprain of ankle    • Tinea pedis    • Tremor due to multiple drugs    • UTI (urinary tract infection)        History reviewed. No pertinent surgical history.    Family History   Problem Relation Age of Onset   • No Known Problems Mother        Social History     Socioeconomic History   • Marital status: Single     Spouse name: Not on file   • Number of children: Not on file   • Years of education: Not on file   • Highest education level: Not on file   Tobacco Use   • Smoking status: Never Smoker   • Smokeless tobacco: Never Used   Substance and Sexual Activity   • Alcohol use: No   • Drug use: No       Current Outpatient Medications   Medication Sig Dispense Refill   • amLODIPine (NORVASC) 5 MG tablet TAKE ONE TABLET BY MOUTH DAILY 52 tablet 0   • Ascorbic Acid 500 MG capsule Take  by mouth.     • benztropine (COGENTIN) 1 MG tablet Take 1 tablet by mouth 2 (Two) Times a Day. 180 tablet 0   • calcium carbonate-vitamin d 600-400 MG-UNIT per tablet TAKE ONE TABLET BY MOUTH DAILY 30 tablet 11   • CEPACOL EXTRA STRENGTH 15-2.6 MG lozenge lozenge DISSOLVE ONE LOZENGE AS NEEDED  5   • cetirizine (zyrTEC) 10 MG tablet Take 1 tablet by mouth Daily. 90 tablet 0   • chlorhexidine (PERIDEX) 0.12 % solution Brush teeth with one-half ounce in the morning and evening. 473 mL 11   • CORICIDIN 2-325 MG tablet TAKE ONE TABLET BY MOUTH EVERY 8 HOURS AS NEEDED 10 tablet 11   • divalproex (DEPAKOTE) 500 MG DR tablet Take 1,000 mg by mouth 2 (Two) Times a  "Day.  2   • fluticasone (FLONASE) 50 MCG/ACT nasal spray INSTILL ONE SPRAY IN EACH NOSTRIL DAILY 16 g 11   • gemfibrozil (LOPID) 600 MG tablet Take 600 mg by mouth.     • haloperidol (HALDOL) 10 MG tablet Take 10 mg by mouth 2 (Two) Times a Day.  2   • LORazepam (ATIVAN) 1 MG tablet Take 1 tablet by mouth 2 (Two) Times a Day. 64 tablet 2   • metoprolol succinate XL (TOPROL-XL) 50 MG 24 hr tablet Take 1 tablet by mouth Daily. 30 tablet 11   • MIDOL TEEN 500-25 MG tablet TAKE ONE TABLET BY MOUTH EVERY 6 TO 8 HOURS AS NEEDED MENSTRUAL CRAMPS  11   • mirtazapine (REMERON) 7.5 MG tablet TAKE ONE TABLET BY MOUTH AT BEDTIME 30 tablet 5   • Multiple Vitamin (MULTIVITAMIN) tablet TAKE ONE TABLET DAILY 30 tablet 11   • norgestimate-ethinyl estradiol (ORTHO TRI-CYCLEN LO) 0.18/0.215/0.25 MG-25 MCG per tablet Take  by mouth.     • omeprazole (priLOSEC) 40 MG capsule Take 1 capsule by mouth Daily. 30 capsule 2   • perphenazine (TRILAFON) 8 MG tablet Take 8 mg by mouth.     • SF 5000 PLUS 1.1 % cream USE TO BRUSH TEETH ONCE DAILY 51 g 5   • STOOL SOFTENER 100 MG capsule TAKE ONE CAPSULE DAILY 30 capsule 11   • traZODone (DESYREL) 50 MG tablet Take 50 mg by mouth every night at bedtime.  2   • Tri-Estarylla 0.18/0.215/0.25 MG-35 MCG per tablet TAKE ONE TABLET BY MOUTH DAILY 28 tablet 11   • vitamin C (ASCORBIC ACID) 500 MG tablet TAKE ONE TABLET DAILY 30 tablet 11     No current facility-administered medications for this visit.       Review of Systems    Objective   /88 (BP Location: Left arm, Patient Position: Sitting, Cuff Size: Large Adult)   Pulse 80   Temp 97.8 °F (36.6 °C) (Temporal)   Ht 157.5 cm (62.01\")   Wt 94.8 kg (209 lb)   SpO2 98%   BMI 38.22 kg/m²     Physical Exam  Vitals and nursing note reviewed. Exam conducted with a chaperone present.   Constitutional:       General: She is not in acute distress.     Appearance: She is well-developed. She is obese. She is not diaphoretic.   HENT:      Head: " Normocephalic and atraumatic.      Right Ear: External ear normal.      Left Ear: External ear normal.      Nose: Nose normal.   Eyes:      Conjunctiva/sclera: Conjunctivae normal.      Pupils: Pupils are equal, round, and reactive to light.   Neck:      Thyroid: No thyromegaly.      Trachea: No tracheal deviation.   Cardiovascular:      Rate and Rhythm: Normal rate and regular rhythm.      Heart sounds: Normal heart sounds. No murmur heard.   No friction rub. No gallop.    Pulmonary:      Effort: Pulmonary effort is normal. No respiratory distress.      Breath sounds: Normal breath sounds.   Abdominal:      General: Bowel sounds are normal.      Palpations: Abdomen is soft. There is no mass.      Tenderness: There is no abdominal tenderness. There is no guarding.   Musculoskeletal:         General: Normal range of motion.      Cervical back: Normal range of motion and neck supple.   Lymphadenopathy:      Cervical: No cervical adenopathy.   Skin:     General: Skin is warm and dry.      Capillary Refill: Capillary refill takes less than 2 seconds.      Findings: No rash.   Neurological:      Mental Status: She is alert.      Motor: No abnormal muscle tone.      Deep Tendon Reflexes: Reflexes normal.   Psychiatric:      Comments: Throughout exam patient is talking in rambling about past issues of being pregnant and difficulties with her mother and her  and other people which are not applicable or active issues as she is not around these people now.         No results found for this or any previous visit (from the past 2016 hour(s)).  Assessment/Plan   Diagnoses and all orders for this visit:    1. Essential hypertension (Primary)  -     Cancel: Comprehensive Metabolic Panel  -     Cancel: Lipid Panel    2. Schizophrenic disorder (CMS/HCC)    Other orders  -     Cancel: Hemoglobin A1c    Discussed with caregiver in the room concerning patient's current care.  Blood pressure is well controlled.  Continue current  medication unchanged at this time.  Patient with some evidence of schizoaffective symptoms I recommend that she follow-up with psychiatry to caregiver.  Otherwise no other changes in medication at this time.         · COVID-19 Precautions - Patient was compliant in wearing a mask. When I saw the patient, I used appropriate personal protective equipment (PPE) including mask and eye shield (standard procedure).  Additionally, I used gown and gloves if indicated.  Hand hygiene was completed before and after seeing the patient.  · Dictated utilizing Dragon Dictation

## 2021-06-29 RX ORDER — DIAPER,BRIEF,INFANT-TODD,DISP
EACH MISCELLANEOUS
Qty: 28.35 G | Refills: 11 | Status: SHIPPED | OUTPATIENT
Start: 2021-06-29 | End: 2022-04-19

## 2021-07-12 RX ORDER — AMLODIPINE BESYLATE 5 MG/1
TABLET ORAL
Qty: 30 TABLET | Refills: 11 | Status: SHIPPED | OUTPATIENT
Start: 2021-07-12 | End: 2022-06-10

## 2021-07-15 DIAGNOSIS — F25.9 SCHIZOAFFECTIVE DISORDER, UNSPECIFIED TYPE (HCC): ICD-10-CM

## 2021-07-15 DIAGNOSIS — F20.9 SCHIZOPHRENIC DISORDER (HCC): ICD-10-CM

## 2021-07-15 RX ORDER — LORAZEPAM 1 MG/1
TABLET ORAL
Qty: 64 TABLET | Refills: 1 | Status: SHIPPED | OUTPATIENT
Start: 2021-07-15 | End: 2021-09-01

## 2021-08-02 RX ORDER — CHLORHEXIDINE GLUCONATE 0.12 MG/ML
RINSE ORAL
Qty: 473 ML | Refills: 11 | Status: SHIPPED | OUTPATIENT
Start: 2021-08-02 | End: 2022-07-05

## 2021-08-02 RX ORDER — SODIUM FLUORIDE 1.1 G/100G
GEL, DENTIFRICE ORAL
Qty: 51 G | Refills: 5 | Status: SHIPPED | OUTPATIENT
Start: 2021-08-02 | End: 2022-03-07

## 2021-08-02 RX ORDER — FLUTICASONE PROPIONATE 50 MCG
SPRAY, SUSPENSION (ML) NASAL
Qty: 16 G | Refills: 11 | Status: SHIPPED | OUTPATIENT
Start: 2021-08-02 | End: 2022-07-05

## 2021-08-04 RX ORDER — MULTIVITAMIN
TABLET ORAL
Qty: 30 TABLET | Refills: 11 | Status: SHIPPED | OUTPATIENT
Start: 2021-08-04 | End: 2022-06-10

## 2021-08-04 RX ORDER — DOCUSATE SODIUM 100 MG/1
CAPSULE, LIQUID FILLED ORAL
Qty: 30 CAPSULE | Refills: 11 | Status: SHIPPED | OUTPATIENT
Start: 2021-08-04 | End: 2022-06-10

## 2021-08-04 RX ORDER — ASCORBIC ACID 500 MG
TABLET ORAL
Qty: 30 TABLET | Refills: 11 | Status: SHIPPED | OUTPATIENT
Start: 2021-08-04 | End: 2022-06-10

## 2021-08-11 ENCOUNTER — OFFICE VISIT (OUTPATIENT)
Dept: FAMILY MEDICINE CLINIC | Facility: CLINIC | Age: 51
End: 2021-08-11

## 2021-08-11 VITALS
DIASTOLIC BLOOD PRESSURE: 80 MMHG | OXYGEN SATURATION: 98 % | SYSTOLIC BLOOD PRESSURE: 132 MMHG | WEIGHT: 206 LBS | TEMPERATURE: 98.6 F | HEART RATE: 77 BPM | HEIGHT: 62 IN | BODY MASS INDEX: 37.91 KG/M2

## 2021-08-11 DIAGNOSIS — I10 ESSENTIAL HYPERTENSION: ICD-10-CM

## 2021-08-11 DIAGNOSIS — Z12.11 COLON CANCER SCREENING: ICD-10-CM

## 2021-08-11 DIAGNOSIS — Z00.00 ANNUAL PHYSICAL EXAM: Primary | ICD-10-CM

## 2021-08-11 DIAGNOSIS — Z11.59 ENCOUNTER FOR HEPATITIS C SCREENING TEST FOR LOW RISK PATIENT: ICD-10-CM

## 2021-08-11 DIAGNOSIS — E78.1 ESSENTIAL HYPERTRIGLYCERIDEMIA: ICD-10-CM

## 2021-08-11 DIAGNOSIS — E78.2 MIXED HYPERLIPIDEMIA: ICD-10-CM

## 2021-08-11 PROCEDURE — 99396 PREV VISIT EST AGE 40-64: CPT | Performed by: INTERNAL MEDICINE

## 2021-08-11 NOTE — PROGRESS NOTES
Subjective   Charity Orr is a 50 y.o. female.     Chief Complaint   Patient presents with   • Annual Exam       History of Present Illness   Caregiver, Garcia, was present during the history-taking and subsequent discussion (and for part of the physical exam) with this patient.  Patient agrees to the presence of the individual during this visit.     Follow-up for hypertension.  Currently has been feeling well and asymptomatic without any headaches,vision changes, cough, chest pain, shortness of breath, swelling, focal neurologic deficit, memory loss or syncope.  Has been taking the medications regularly and adherent with the regimen of amlodipine 5 mg and metoprolol succinate XL 50 mg.  Denies medication side effects and no significant interval events.      The following portions of the patient's history were reviewed and updated as appropriate: allergies, current medications, past family history, past medical history, past social history, past surgical history and problem list.    Depression Screen:  PHQ-2/PHQ-9 Depression Screening 1/5/2021   Little interest or pleasure in doing things 0   Feeling down, depressed, or hopeless 0   Trouble falling or staying asleep, or sleeping too much -   Feeling tired or having little energy -   Poor appetite or overeating -   Feeling bad about yourself - or that you are a failure or have let yourself or your family down -   Trouble concentrating on things, such as reading the newspaper or watching television -   Moving or speaking so slowly that other people could have noticed. Or the opposite - being so fidgety or restless that you have been moving around a lot more than usual -   Thoughts that you would be better off dead, or of hurting yourself in some way -   Total Score 0       Past Medical History:   Diagnosis Date   • Allergic rhinitis    • Back pain    • Dietary calcium deficiency    • Esophageal reflux    • Essential hypertriglyceridemia    • Flu vaccine need    • Foot  pain, left    • GERD (gastroesophageal reflux disease)    • History of allergy    • Hyperlipidemia    • Hypertension    • Left foot pain    • Leukocytosis    • Lumbar muscle pain    • Mild mental retardation    • Mild mental retardation    • Pain    • Schizophrenic disorder (CMS/HCC)    • Soft tissue mass    • Sprain of ankle    • Tinea pedis    • Tremor due to multiple drugs    • UTI (urinary tract infection)        History reviewed. No pertinent surgical history.    Family History   Problem Relation Age of Onset   • No Known Problems Mother        Social History     Socioeconomic History   • Marital status: Single     Spouse name: Not on file   • Number of children: Not on file   • Years of education: Not on file   • Highest education level: Not on file   Tobacco Use   • Smoking status: Never Smoker   • Smokeless tobacco: Never Used   Substance and Sexual Activity   • Alcohol use: No   • Drug use: No       Current Outpatient Medications   Medication Sig Dispense Refill   • amLODIPine (NORVASC) 5 MG tablet TAKE ONE TABLET BY MOUTH DAILY 30 tablet 11   • ascorbic acid (VITAMIN C) 500 MG tablet TAKE ONE TABLET BY MOUTH DAILY 30 tablet 11   • Ascorbic Acid 500 MG capsule Take  by mouth.     • benztropine (COGENTIN) 1 MG tablet Take 1 tablet by mouth 2 (Two) Times a Day. 180 tablet 0   • Calcium Carb-Cholecalciferol 600-400 MG-UNIT tablet TAKE ONE TABLET BY MOUTH DAILY 30 tablet 11   • CEPACOL EXTRA STRENGTH 15-2.6 MG lozenge lozenge DISSOLVE ONE LOZENGE AS NEEDED  5   • cetirizine (zyrTEC) 10 MG tablet Take 1 tablet by mouth Daily. 90 tablet 3   • chlorhexidine (PERIDEX) 0.12 % solution BRUSH TEETH WITH ONE-HALF OUNCE EVERY MORNING AND IN THE EVENING 473 mL 11   • CORICIDIN 2-325 MG tablet TAKE ONE TABLET BY MOUTH EVERY 8 HOURS AS NEEDED 10 tablet 11   • Denta 5000 Plus 1.1 % cream USE TO BRUSH TEETH ONCE DAILY 51 g 5   • divalproex (DEPAKOTE) 500 MG DR tablet Take 1,000 mg by mouth 2 (Two) Times a Day.  2   •  fluticasone (FLONASE) 50 MCG/ACT nasal spray INSTILL ONE SPRAY IN EACH NOSTRIL DAILY 16 g 11   • gemfibrozil (LOPID) 600 MG tablet Take 600 mg by mouth.     • haloperidol (HALDOL) 10 MG tablet Take 10 mg by mouth 2 (Two) Times a Day.  2   • hydrocortisone 1 % cream APPLY TO AFFECTED AREA TWICE DAILY AS NEEDED 28.35 g 11   • LORazepam (ATIVAN) 1 MG tablet TAKE ONE TABLET BY MOUTH TWICE DAILY 64 tablet 1   • metoprolol succinate XL (TOPROL-XL) 50 MG 24 hr tablet Take 1 tablet by mouth Daily. 30 tablet 11   • MIDOL TEEN 500-25 MG tablet TAKE ONE TABLET BY MOUTH EVERY 6 TO 8 HOURS AS NEEDED MENSTRUAL CRAMPS  11   • mirtazapine (REMERON) 7.5 MG tablet TAKE ONE TABLET BY MOUTH AT BEDTIME 30 tablet 5   • norgestimate-ethinyl estradiol (ORTHO TRI-CYCLEN LO) 0.18/0.215/0.25 MG-25 MCG per tablet Take  by mouth.     • omeprazole (priLOSEC) 40 MG capsule Take 1 capsule by mouth Daily. 30 capsule 2   • One-Daily Multi-Vitamin tablet tablet TAKE ONE TABLET BY MOUTH DAILY 30 tablet 11   • perphenazine (TRILAFON) 8 MG tablet Take 8 mg by mouth.     • Stool Softener Laxative 100 MG capsule TAKE ONE CAPSULE BY MOUTH DAILY 30 capsule 11   • traZODone (DESYREL) 50 MG tablet Take 50 mg by mouth every night at bedtime.  2   • Tri-Estarylla 0.18/0.215/0.25 MG-35 MCG per tablet TAKE ONE TABLET BY MOUTH DAILY 28 tablet 11     No current facility-administered medications for this visit.       Review of Systems   Constitutional: Negative for activity change, appetite change, fatigue, fever, unexpected weight gain and unexpected weight loss.   HENT: Negative for nosebleeds, rhinorrhea, trouble swallowing and voice change.    Eyes: Negative for visual disturbance.   Respiratory: Negative for cough, chest tightness, shortness of breath and wheezing.    Cardiovascular: Negative for chest pain, palpitations and leg swelling.   Gastrointestinal: Negative for abdominal pain, blood in stool, constipation, diarrhea, nausea, vomiting, GERD and  "indigestion.   Genitourinary: Negative for dysuria, frequency and hematuria.   Musculoskeletal: Negative for arthralgias, back pain and myalgias.   Skin: Negative for rash and bruise.   Neurological: Negative for dizziness, tremors, weakness, light-headedness, numbness, headache and memory problem.   Hematological: Negative for adenopathy. Does not bruise/bleed easily.   Psychiatric/Behavioral: Negative for sleep disturbance and depressed mood. The patient is not nervous/anxious.        Objective   /80 (BP Location: Left arm, Patient Position: Sitting, Cuff Size: Adult)   Pulse 77   Temp 98.6 °F (37 °C) (Temporal)   Ht 157.5 cm (62.01\")   Wt 93.4 kg (206 lb)   SpO2 98%   BMI 37.67 kg/m²     Physical Exam  Vitals and nursing note reviewed.   Constitutional:       General: She is not in acute distress.     Appearance: She is well-developed. She is obese. She is not diaphoretic.   HENT:      Head: Normocephalic and atraumatic.      Right Ear: External ear normal.      Left Ear: External ear normal.      Nose: Nose normal.   Eyes:      Conjunctiva/sclera: Conjunctivae normal.      Pupils: Pupils are equal, round, and reactive to light.   Neck:      Thyroid: No thyromegaly.      Trachea: No tracheal deviation.   Cardiovascular:      Rate and Rhythm: Normal rate and regular rhythm.      Heart sounds: Normal heart sounds. No murmur heard.   No friction rub. No gallop.    Pulmonary:      Effort: Pulmonary effort is normal. No respiratory distress.      Breath sounds: Normal breath sounds.   Abdominal:      General: Bowel sounds are normal.      Palpations: Abdomen is soft. There is no mass.      Tenderness: There is no abdominal tenderness. There is no guarding.   Musculoskeletal:         General: Normal range of motion.      Cervical back: Normal range of motion and neck supple.   Lymphadenopathy:      Cervical: No cervical adenopathy.   Skin:     General: Skin is warm and dry.      Capillary Refill: Capillary " refill takes less than 2 seconds.      Findings: No rash.   Neurological:      Mental Status: She is alert and oriented to person, place, and time.      Motor: No abnormal muscle tone.      Deep Tendon Reflexes: Reflexes normal.   Psychiatric:         Behavior: Behavior normal.      Comments: When talking goes off topic easily refers to stories are not believable but is calm and collected in the room.         No results found for this or any previous visit (from the past 2016 hour(s)).  Assessment/Plan   Diagnoses and all orders for this visit:    1. Annual physical exam (Primary)    2. Essential hypertension  -     Comprehensive Metabolic Panel  -     Lipid Panel    3. Mixed hyperlipidemia  -     Comprehensive Metabolic Panel  -     Lipid Panel    4. Essential hypertriglyceridemia  -     Comprehensive Metabolic Panel  -     Lipid Panel    5. Colon cancer screening  -     Cologuard - Stool, Per Rectum; Future    6. Encounter for hepatitis C screening test for low risk patient  -     Hepatitis C Antibody    Hypertension is well controlled.  Is due to have labs to check the lipid panel and to be sure there is no side effects from medications including renal and liver function.  No change in these medications at this time.  She is now 50 years old and she is due to have colon cancer screening.  After discussion with patient and caregiver Cologuard has been ordered.  History of schizophrenic and schizoaffective disorder.  She is to continue to follow-up with [facility].  She was worried about some tremors which may be related to her medications and this should be followed through [facility].           · COVID-19 Precautions - Patient was compliant in wearing a mask. When I saw the patient, I used appropriate personal protective equipment (PPE) including mask and eye shield (standard procedure).  Additionally, I used gown and gloves if indicated.  Hand hygiene was completed before and after seeing  the patient.  · Dictated utilizing Dragon Dictation

## 2021-08-12 LAB
ALBUMIN SERPL-MCNC: 3.9 G/DL (ref 3.5–5.2)
ALBUMIN/GLOB SERPL: 1.5 G/DL
ALP SERPL-CCNC: 68 U/L (ref 39–117)
ALT SERPL-CCNC: 18 U/L (ref 1–33)
AST SERPL-CCNC: 28 U/L (ref 1–32)
BILIRUB SERPL-MCNC: 0.2 MG/DL (ref 0–1.2)
BUN SERPL-MCNC: 11 MG/DL (ref 6–20)
BUN/CREAT SERPL: 23.4 (ref 7–25)
CALCIUM SERPL-MCNC: 9.4 MG/DL (ref 8.6–10.5)
CHLORIDE SERPL-SCNC: 101 MMOL/L (ref 98–107)
CHOLEST SERPL-MCNC: 117 MG/DL (ref 0–200)
CO2 SERPL-SCNC: 24.8 MMOL/L (ref 22–29)
CREAT SERPL-MCNC: 0.47 MG/DL (ref 0.57–1)
GLOBULIN SER CALC-MCNC: 2.6 GM/DL
GLUCOSE SERPL-MCNC: 117 MG/DL (ref 65–99)
HCV AB S/CO SERPL IA: <0.1 S/CO RATIO (ref 0–0.9)
HDLC SERPL-MCNC: 31 MG/DL (ref 40–60)
LDLC SERPL CALC-MCNC: 23 MG/DL (ref 0–100)
POTASSIUM SERPL-SCNC: 4 MMOL/L (ref 3.5–5.2)
PROT SERPL-MCNC: 6.5 G/DL (ref 6–8.5)
SODIUM SERPL-SCNC: 141 MMOL/L (ref 136–145)
TRIGL SERPL-MCNC: 457 MG/DL (ref 0–150)
VLDLC SERPL CALC-MCNC: 63 MG/DL (ref 5–40)

## 2021-09-01 DIAGNOSIS — F25.9 SCHIZOAFFECTIVE DISORDER, UNSPECIFIED TYPE (HCC): ICD-10-CM

## 2021-09-01 DIAGNOSIS — F20.9 SCHIZOPHRENIC DISORDER (HCC): ICD-10-CM

## 2021-09-01 RX ORDER — LORAZEPAM 1 MG/1
TABLET ORAL
Qty: 64 TABLET | Refills: 2 | Status: SHIPPED | OUTPATIENT
Start: 2021-09-01 | End: 2021-11-29

## 2021-09-01 NOTE — TELEPHONE ENCOUNTER
Rx Refill Note  Requested Prescriptions     Pending Prescriptions Disp Refills   • LORazepam (ATIVAN) 1 MG tablet [Pharmacy Med Name: lorazepam 1 mg tablet] 64 tablet 1     Sig: TAKE ONE TABLET BY MOUTH TWICE DAILY      Last office visit with prescribing clinician: 8/11/2021      Next office visit with prescribing clinician: Visit date not found   Last filled: 7/15/21           Regina Knott MA  09/01/21, 11:09 EDT

## 2021-09-27 RX ORDER — IBUPROFEN 200 MG
TABLET ORAL
Qty: 90 TABLET | Refills: 11 | OUTPATIENT
Start: 2021-09-27

## 2021-09-28 DIAGNOSIS — Z12.31 SCREENING MAMMOGRAM, ENCOUNTER FOR: Primary | ICD-10-CM

## 2021-10-04 RX ORDER — OMEPRAZOLE 40 MG/1
CAPSULE, DELAYED RELEASE ORAL
Qty: 30 CAPSULE | Refills: 2 | Status: SHIPPED | OUTPATIENT
Start: 2021-10-04 | End: 2022-01-21

## 2021-10-04 NOTE — TELEPHONE ENCOUNTER
Rx Refill Note  Requested Prescriptions     Pending Prescriptions Disp Refills   • omeprazole (priLOSEC) 40 MG capsule [Pharmacy Med Name: omeprazole 40 mg capsule,delayed release] 30 capsule 2     Sig: TAKE ONE CAPSULE DAILY      Last office visit with prescribing clinician: 8/11/2021      Next office visit with prescribing clinician: Visit date not found            Regina Knott MA  10/04/21, 09:46 EDT

## 2021-11-01 ENCOUNTER — TELEPHONE (OUTPATIENT)
Dept: FAMILY MEDICINE CLINIC | Facility: CLINIC | Age: 51
End: 2021-11-01

## 2021-11-01 NOTE — TELEPHONE ENCOUNTER
Emily calling regarding refill RX-Ibuprofen,  stated pharmacy advised meds denied d/t not being on pt's med list.  Please call Emily at  to advise

## 2021-11-09 RX ORDER — IBUPROFEN 200 MG
200 TABLET ORAL EVERY 6 HOURS PRN
Qty: 60 TABLET | Refills: 6 | Status: SHIPPED | OUTPATIENT
Start: 2021-11-09 | End: 2022-02-01

## 2021-11-26 DIAGNOSIS — F20.9 SCHIZOPHRENIC DISORDER (HCC): ICD-10-CM

## 2021-11-26 DIAGNOSIS — F25.9 SCHIZOAFFECTIVE DISORDER, UNSPECIFIED TYPE (HCC): ICD-10-CM

## 2021-11-29 RX ORDER — LORAZEPAM 1 MG/1
TABLET ORAL
Qty: 64 TABLET | Refills: 2 | Status: SHIPPED | OUTPATIENT
Start: 2021-11-29 | End: 2022-01-26

## 2021-12-21 RX ORDER — OMEPRAZOLE 40 MG/1
CAPSULE, DELAYED RELEASE ORAL
Qty: 28 CAPSULE | Refills: 2 | OUTPATIENT
Start: 2021-12-21

## 2021-12-21 NOTE — TELEPHONE ENCOUNTER
Called and spoke with Jose (caregiver on HIPPA) and she stated that she did not need anything filled.  Apt made for patient

## 2021-12-21 NOTE — TELEPHONE ENCOUNTER
Rx Refill Note  Requested Prescriptions     Pending Prescriptions Disp Refills   • omeprazole (priLOSEC) 40 MG capsule [Pharmacy Med Name: omeprazole 40 mg capsule,delayed release] 28 capsule 2     Sig: TAKE ONE CAPSULE DAILY      Last office visit with prescribing clinician: 8/11/2021      Next office visit with prescribing clinician: Visit date not found            Mini Najera MA  12/21/21, 15:24 EST

## 2022-01-19 ENCOUNTER — OFFICE VISIT (OUTPATIENT)
Dept: FAMILY MEDICINE CLINIC | Facility: CLINIC | Age: 52
End: 2022-01-19

## 2022-01-19 VITALS
HEART RATE: 80 BPM | HEIGHT: 62 IN | BODY MASS INDEX: 38.83 KG/M2 | TEMPERATURE: 98 F | DIASTOLIC BLOOD PRESSURE: 80 MMHG | WEIGHT: 211 LBS | OXYGEN SATURATION: 94 % | SYSTOLIC BLOOD PRESSURE: 122 MMHG

## 2022-01-19 DIAGNOSIS — I10 PRIMARY HYPERTENSION: Primary | ICD-10-CM

## 2022-01-19 PROCEDURE — 99214 OFFICE O/P EST MOD 30 MIN: CPT | Performed by: INTERNAL MEDICINE

## 2022-01-19 NOTE — PROGRESS NOTES
Subjective   Charity Orr is a 51 y.o. female.     Chief Complaint   Patient presents with   • Schizophrenia     follow up    • Hyperlipidemia       History of Present Illness   Caregiver, Garcia, was present during the history-taking and subsequent discussion (and for part of the physical exam) with this patient.  Patient agrees to the presence of the individual during this visit.     Follow-up for hypertension.  Currently has been feeling well and asymptomatic without any headaches,vision changes, cough, chest pain, shortness of breath, swelling, focal neurologic deficit, memory loss or syncope.  Has been taking the medications regularly and adherent with the regimen of amlodipine 5 mg and metoprolol succinate XL 50 mg.  Denies medication side effects and no significant interval events.      The following portions of the patient's history were reviewed and updated as appropriate: allergies, current medications, past family history, past medical history, past social history, past surgical history and problem list.    Depression Screen:  PHQ-2/PHQ-9 Depression Screening 1/5/2021   Little interest or pleasure in doing things 0   Feeling down, depressed, or hopeless 0   Trouble falling or staying asleep, or sleeping too much -   Feeling tired or having little energy -   Poor appetite or overeating -   Feeling bad about yourself - or that you are a failure or have let yourself or your family down -   Trouble concentrating on things, such as reading the newspaper or watching television -   Moving or speaking so slowly that other people could have noticed. Or the opposite - being so fidgety or restless that you have been moving around a lot more than usual -   Thoughts that you would be better off dead, or of hurting yourself in some way -   Total Score 0       Past Medical History:   Diagnosis Date   • Allergic rhinitis    • Back pain    • Dietary calcium deficiency    • Esophageal reflux    • Essential  hypertriglyceridemia    • Flu vaccine need    • Foot pain, left    • GERD (gastroesophageal reflux disease)    • History of allergy    • Hyperlipidemia    • Hypertension    • Left foot pain    • Leukocytosis    • Lumbar muscle pain    • Mild mental retardation    • Mild mental retardation    • Pain    • Schizophrenic disorder (HCC)    • Soft tissue mass    • Sprain of ankle    • Tinea pedis    • Tremor due to multiple drugs    • UTI (urinary tract infection)        History reviewed. No pertinent surgical history.    Family History   Problem Relation Age of Onset   • No Known Problems Mother        Social History     Socioeconomic History   • Marital status: Single   Tobacco Use   • Smoking status: Never Smoker   • Smokeless tobacco: Never Used   Substance and Sexual Activity   • Alcohol use: No   • Drug use: No       Current Outpatient Medications   Medication Sig Dispense Refill   • amLODIPine (NORVASC) 5 MG tablet TAKE ONE TABLET BY MOUTH DAILY 30 tablet 11   • ascorbic acid (VITAMIN C) 500 MG tablet TAKE ONE TABLET BY MOUTH DAILY 30 tablet 11   • Ascorbic Acid 500 MG capsule Take  by mouth.     • benztropine (COGENTIN) 1 MG tablet Take 1 tablet by mouth 2 (Two) Times a Day. 180 tablet 0   • Calcium Carb-Cholecalciferol 600-400 MG-UNIT tablet TAKE ONE TABLET BY MOUTH DAILY 30 tablet 11   • CEPACOL EXTRA STRENGTH 15-2.6 MG lozenge lozenge DISSOLVE ONE LOZENGE AS NEEDED  5   • cetirizine (zyrTEC) 10 MG tablet Take 1 tablet by mouth Daily. 90 tablet 3   • chlorhexidine (PERIDEX) 0.12 % solution BRUSH TEETH WITH ONE-HALF OUNCE EVERY MORNING AND IN THE EVENING 473 mL 11   • CORICIDIN 2-325 MG tablet TAKE ONE TABLET BY MOUTH EVERY 8 HOURS AS NEEDED 10 tablet 11   • Denta 5000 Plus 1.1 % cream USE TO BRUSH TEETH ONCE DAILY 51 g 5   • divalproex (DEPAKOTE) 500 MG DR tablet Take 1,000 mg by mouth 2 (Two) Times a Day.  2   • fluticasone (FLONASE) 50 MCG/ACT nasal spray INSTILL ONE SPRAY IN EACH NOSTRIL DAILY 16 g 11   •  gemfibrozil (LOPID) 600 MG tablet Take 600 mg by mouth.     • haloperidol (HALDOL) 10 MG tablet Take 10 mg by mouth 2 (Two) Times a Day.  2   • hydrocortisone 1 % cream APPLY TO AFFECTED AREA TWICE DAILY AS NEEDED 28.35 g 11   • ibuprofen (ADVIL,MOTRIN) 200 MG tablet Take 1 tablet by mouth Every 6 (Six) Hours As Needed for Mild Pain  or Moderate Pain . 60 tablet 6   • LORazepam (ATIVAN) 1 MG tablet TAKE ONE TABLET BY MOUTH TWICE DAILY 64 tablet 2   • metoprolol succinate XL (TOPROL-XL) 50 MG 24 hr tablet Take 1 tablet by mouth Daily. 30 tablet 11   • MIDOL TEEN 500-25 MG tablet TAKE ONE TABLET BY MOUTH EVERY 6 TO 8 HOURS AS NEEDED MENSTRUAL CRAMPS  11   • mirtazapine (REMERON) 7.5 MG tablet TAKE ONE TABLET BY MOUTH AT BEDTIME 30 tablet 5   • norgestimate-ethinyl estradiol (ORTHO TRI-CYCLEN LO) 0.18/0.215/0.25 MG-25 MCG per tablet Take  by mouth.     • omeprazole (priLOSEC) 40 MG capsule TAKE ONE CAPSULE DAILY 30 capsule 2   • One-Daily Multi-Vitamin tablet tablet TAKE ONE TABLET BY MOUTH DAILY 30 tablet 11   • perphenazine (TRILAFON) 8 MG tablet Take 8 mg by mouth.     • Stool Softener Laxative 100 MG capsule TAKE ONE CAPSULE BY MOUTH DAILY 30 capsule 11   • traZODone (DESYREL) 50 MG tablet Take 50 mg by mouth every night at bedtime.  2   • Tri-Estarylla 0.18/0.215/0.25 MG-35 MCG per tablet TAKE ONE TABLET BY MOUTH DAILY 28 tablet 11     No current facility-administered medications for this visit.       Review of Systems   Constitutional: Negative for activity change, appetite change, fatigue, fever, unexpected weight gain and unexpected weight loss.   HENT: Negative for nosebleeds, rhinorrhea, trouble swallowing and voice change.    Eyes: Negative for visual disturbance.   Respiratory: Negative for cough, chest tightness, shortness of breath and wheezing.    Cardiovascular: Negative for chest pain, palpitations and leg swelling.   Gastrointestinal: Negative for abdominal pain, blood in stool, constipation,  "diarrhea, nausea, vomiting, GERD and indigestion.   Genitourinary: Negative for dysuria, frequency and hematuria.   Musculoskeletal: Negative for arthralgias, back pain and myalgias.   Skin: Negative for rash and wound.   Neurological: Negative for dizziness, tremors, weakness, light-headedness, numbness, headache and memory problem.   Hematological: Negative for adenopathy. Does not bruise/bleed easily.   Psychiatric/Behavioral: Negative for sleep disturbance and depressed mood. The patient is not nervous/anxious.        Objective   /80 (BP Location: Left arm, Patient Position: Sitting, Cuff Size: Large Adult)   Pulse 80   Temp 98 °F (36.7 °C) (Temporal)   Ht 157.5 cm (62.01\")   Wt 95.7 kg (211 lb)   SpO2 94%   BMI 38.58 kg/m²     Physical Exam  Vitals and nursing note reviewed.   Constitutional:       General: She is not in acute distress.     Appearance: She is well-developed. She is not diaphoretic.   HENT:      Head: Normocephalic and atraumatic.      Right Ear: External ear normal.      Left Ear: External ear normal.      Nose: Nose normal.   Eyes:      Conjunctiva/sclera: Conjunctivae normal.      Pupils: Pupils are equal, round, and reactive to light.   Neck:      Thyroid: No thyromegaly.      Trachea: No tracheal deviation.   Cardiovascular:      Rate and Rhythm: Normal rate and regular rhythm.      Heart sounds: Normal heart sounds. No murmur heard.  No friction rub. No gallop.    Pulmonary:      Effort: Pulmonary effort is normal. No respiratory distress.      Breath sounds: Normal breath sounds.   Abdominal:      General: Bowel sounds are normal.      Palpations: Abdomen is soft. There is no mass.      Tenderness: There is no abdominal tenderness. There is no guarding.   Musculoskeletal:         General: Normal range of motion.      Cervical back: Normal range of motion and neck supple.   Lymphadenopathy:      Cervical: No cervical adenopathy.   Skin:     General: Skin is warm and dry.      " Capillary Refill: Capillary refill takes less than 2 seconds.      Findings: No rash.   Neurological:      Mental Status: She is alert and oriented to person, place, and time.      Motor: No abnormal muscle tone.      Deep Tendon Reflexes: Reflexes normal.   Psychiatric:         Behavior: Behavior normal.         Thought Content: Thought content normal.         Judgment: Judgment normal.         No results found for this or any previous visit (from the past 2016 hour(s)).  Assessment/Plan   Diagnoses and all orders for this visit:    1. Primary hypertension (Primary)    Hypertension is well controlled.  Continue current medication unchanged.  Discussed with caregiver her current schizophrenia for which she is being seen by [facility].  Continue to monitor and we will see what the labs were from [facility].  Asked that those results to be forwarded to us to see what her A1c is.  If her A1c is extremely elevated then we may consider treatment but otherwise I would recommend diet and exercise at this time.           · COVID-19 Precautions - Patient was compliant in wearing a mask. When I saw the patient, I used appropriate personal protective equipment (PPE) including mask and eye shield (standard procedure).  Additionally, I used gown and gloves if indicated.  Hand hygiene was completed before and after seeing the patient.  · Dictated utilizing Dragon Dictation

## 2022-01-20 RX ORDER — SODIUM FLUORIDE 5 MG/G
CREAM DENTAL
Qty: 51 G | Refills: 5 | OUTPATIENT
Start: 2022-01-20

## 2022-01-20 NOTE — TELEPHONE ENCOUNTER
Rx Refill Note  Requested Prescriptions     Pending Prescriptions Disp Refills   • Sodium Fluoride 5000 Plus 1.1 % cream [Pharmacy Med Name: Sodium Fluoride 5000 Plus 1.1 % dental cream] 51 g 5     Sig: USE TO BRUSH TEETH ONCE DAILY      Last office visit with prescribing clinician:  1/19/2022    Next office visit with prescribing clinician:  Due 8/16/22 RAMIRO Banegas MA  01/20/22, 08:39 EST

## 2022-01-21 RX ORDER — OMEPRAZOLE 40 MG/1
CAPSULE, DELAYED RELEASE ORAL
Qty: 28 CAPSULE | Refills: 2 | Status: SHIPPED | OUTPATIENT
Start: 2022-01-21 | End: 2022-03-18

## 2022-01-21 NOTE — TELEPHONE ENCOUNTER
Rx Refill Note  Requested Prescriptions     Pending Prescriptions Disp Refills   • omeprazole (priLOSEC) 40 MG capsule [Pharmacy Med Name: omeprazole 40 mg capsule,delayed release] 28 capsule 2     Sig: TAKE ONE CAPSULE DAILY      Last office visit with prescribing clinician: 1/19/2022      Next office visit with prescribing clinician: Visit date not found            Regina Knott MA  01/21/22, 13:01 EST

## 2022-01-26 DIAGNOSIS — F25.9 SCHIZOAFFECTIVE DISORDER, UNSPECIFIED TYPE: ICD-10-CM

## 2022-01-26 DIAGNOSIS — F20.9 SCHIZOPHRENIC DISORDER: ICD-10-CM

## 2022-01-26 RX ORDER — LORAZEPAM 1 MG/1
TABLET ORAL
Qty: 56 TABLET | Refills: 2 | Status: SHIPPED | OUTPATIENT
Start: 2022-01-26 | End: 2022-04-18

## 2022-01-26 NOTE — TELEPHONE ENCOUNTER
Rx Refill Note  Requested Prescriptions     Pending Prescriptions Disp Refills   • LORazepam (ATIVAN) 1 MG tablet [Pharmacy Med Name: lorazepam 1 mg tablet] 56 tablet 2     Sig: TAKE ONE TABLET BY MOUTH TWICE DAILY      Last office visit with prescribing clinician: 1/19/2022      Next office visit with prescribing clinician: Visit date not found            Regina Knott MA  01/26/22, 16:45 EST

## 2022-01-31 RX ORDER — BENZOCAINE AND MENTHOL 15; 2.6 MG/1; MG/1
LOZENGE ORAL
Qty: 32 LOZENGE | Refills: 11 | Status: SHIPPED | OUTPATIENT
Start: 2022-01-31 | End: 2022-02-01

## 2022-01-31 NOTE — TELEPHONE ENCOUNTER
Rx Refill Note  Requested Prescriptions     Pending Prescriptions Disp Refills   • benzocaine-menthol (Cepacol Extra Strength) 15-2.6 MG lozenge lozenge [Pharmacy Med Name: Cepacol Sore Throat 15-3.6 Lozg 16 Ea] 32 lozenge 11     Sig: DISSOLVE IN MOUTH AS NEEDED FOR THROAT PAIN      Last office visit with prescribing clinician: 1/19/2022      Next office visit with prescribing clinician: Visit date not found            Regina Knott MA  01/31/22, 12:03 EST

## 2022-02-01 RX ORDER — BENZOCAINE AND MENTHOL 15; 2.6 MG/1; MG/1
LOZENGE ORAL
Qty: 32 EACH | Refills: 11 | Status: SHIPPED | OUTPATIENT
Start: 2022-02-01 | End: 2022-03-08

## 2022-02-01 RX ORDER — ACETAMINOPHEN AND CHLORPHENIRAMINE MALEATE 325; 2 MG/1; MG/1
TABLET, FILM COATED ORAL
Qty: 10 TABLET | Refills: 11 | Status: SHIPPED | OUTPATIENT
Start: 2022-02-01 | End: 2022-03-08

## 2022-02-01 RX ORDER — IBUPROFEN 200 MG
TABLET ORAL
Qty: 60 TABLET | Refills: 6 | Status: SHIPPED | OUTPATIENT
Start: 2022-02-01 | End: 2022-03-08

## 2022-02-01 NOTE — TELEPHONE ENCOUNTER
Rx Refill Note  Requested Prescriptions     Pending Prescriptions Disp Refills   • benzocaine-menthol (Cepacol Extra Strength) 15-2.6 MG lozenge lozenge [Pharmacy Med Name: Cepacol Sore Throat 15-3.6 Lozg 16 Ea] 32 each 11     Sig: DISSOLVE IN MOUTH AS NEEDED FOR THROAT PAIN      Last office visit with prescribing clinician: 1/19/2022      Next office visit with prescribing clinician: Visit date not found            Regina Knott MA  02/01/22, 16:32 EST

## 2022-02-01 NOTE — TELEPHONE ENCOUNTER
Rx Refill Note  Requested Prescriptions     Pending Prescriptions Disp Refills   • CORICIDIN 2-325 MG tablet [Pharmacy Med Name: Coricidin HBP Cold and Flu 2 mg-325 mg tablet] 10 tablet 11     Sig: TAKE ONE TABLET BY MOUTH EVERY EIGHT HOURS AS NEEDED   • ibuprofen (ADVIL,MOTRIN) 200 MG tablet [Pharmacy Med Name: ibuprofen 200 mg tablet] 60 tablet 6     Sig: TAKE ONE TABLET BY MOUTH EVERY 6 HOURS AS NEEDED FOR PAIN      Last office visit with prescribing clinician: 1/19/2022      Next office visit with prescribing clinician: 2/1/2022            Regina Knott MA  02/01/22, 16:31 EST

## 2022-03-07 RX ORDER — SODIUM FLUORIDE 5 MG/G
CREAM DENTAL
Qty: 51 G | Refills: 5 | Status: SHIPPED | OUTPATIENT
Start: 2022-03-07 | End: 2022-08-01

## 2022-03-07 NOTE — TELEPHONE ENCOUNTER
Rx Refill Note  Requested Prescriptions     Pending Prescriptions Disp Refills   • Sodium Fluoride 5000 Plus 1.1 % cream [Pharmacy Med Name: Sodium Fluoride 5000 Plus 1.1 % dental cream] 51 g 5     Sig: USE TO BRUSH TEETH ONCE DAILY      Last office visit with prescribing clinician: 1/19/2022      Next office visit with prescribing clinician: Visit date not found            Regina Knott MA  03/07/22, 14:56 EST

## 2022-03-08 RX ORDER — IBUPROFEN 200 MG
TABLET ORAL
Qty: 60 TABLET | Refills: 11 | Status: SHIPPED | OUTPATIENT
Start: 2022-03-08

## 2022-03-08 RX ORDER — SODIUM FLUORIDE 5 MG/G
CREAM DENTAL
Qty: 51 G | Refills: 5 | OUTPATIENT
Start: 2022-03-08

## 2022-03-08 RX ORDER — ACETAMINOPHEN AND CHLORPHENIRAMINE MALEATE 325; 2 MG/1; MG/1
TABLET, FILM COATED ORAL
Qty: 20 TABLET | Refills: 11 | Status: SHIPPED | OUTPATIENT
Start: 2022-03-08

## 2022-03-08 RX ORDER — BENZOCAINE 2.6; 15 MG/1; MG/1
LOZENGE ORAL
Qty: 32 LOZENGE | Refills: 11 | Status: SHIPPED | OUTPATIENT
Start: 2022-03-08 | End: 2023-03-25

## 2022-03-08 NOTE — TELEPHONE ENCOUNTER
Rx Refill Note  Requested Prescriptions     Pending Prescriptions Disp Refills   • Sore Throat 15-2.6 MG lozenge lozenge [Pharmacy Med Name: Sore Throat (benzocaine with menthol) 15 mg-2.6 mg lozenges] 32 lozenge 11     Sig: DISSOLVE IN MOUTH AS NEEDED FOR THROAT PAIN   • ibuprofen (ADVIL,MOTRIN) 200 MG tablet [Pharmacy Med Name: ibuprofen 200 mg tablet] 60 tablet 11     Sig: TAKE ONE TABLET BY MOUTH EVERY 6 HOURS AS NEEDED FOR PAIN   • CORICIDIN 2-325 MG tablet [Pharmacy Med Name: Coricidin HBP Cold and Flu 2 mg-325 mg tablet] 20 tablet 11     Sig: TAKE ONE TABLET BY MOUTH EVERY EIGHT HOURS AS NEEDED      Last office visit with prescribing clinician: 1/19/2022      Next office visit with prescribing clinician: Visit date not found            Regina Knott MA  03/08/22, 13:56 EST

## 2022-03-09 RX ORDER — SODIUM FLUORIDE 5 MG/G
CREAM DENTAL
Qty: 51 G | Refills: 5 | OUTPATIENT
Start: 2022-03-09

## 2022-03-09 NOTE — TELEPHONE ENCOUNTER
Rx Refill Note  Requested Prescriptions     Pending Prescriptions Disp Refills   • Sodium Fluoride 5000 Plus 1.1 % cream [Pharmacy Med Name: Sodium Fluoride 5000 Plus 1.1 % dental cream] 51 g 5     Sig: USE TO BRUSH TEETH ONCE DAILY      Last office visit with prescribing clinician: 1/19/2022      Next office visit with prescribing clinician: due 8/      {TIP  Please add Last Relevant Lab Date if appropriate:23}     Ynes Chicas MA  03/09/22, 11:06 EST

## 2022-03-18 RX ORDER — OMEPRAZOLE 40 MG/1
CAPSULE, DELAYED RELEASE ORAL
Qty: 28 CAPSULE | Refills: 2 | Status: SHIPPED | OUTPATIENT
Start: 2022-03-18 | End: 2022-06-10

## 2022-03-18 NOTE — TELEPHONE ENCOUNTER
Rx Refill Note  Requested Prescriptions     Pending Prescriptions Disp Refills   • omeprazole (priLOSEC) 40 MG capsule [Pharmacy Med Name: omeprazole 40 mg capsule,delayed release] 28 capsule 2     Sig: TAKE ONE CAPSULE DAILY      Last office visit with prescribing clinician: 1/19/2022      Next office visit with prescribing clinician: 3/23/2022            Regina Knott MA  03/18/22, 14:34 EDT

## 2022-03-23 ENCOUNTER — OFFICE VISIT (OUTPATIENT)
Dept: FAMILY MEDICINE CLINIC | Facility: CLINIC | Age: 52
End: 2022-03-23

## 2022-03-23 VITALS
HEIGHT: 62 IN | WEIGHT: 214 LBS | DIASTOLIC BLOOD PRESSURE: 92 MMHG | SYSTOLIC BLOOD PRESSURE: 138 MMHG | TEMPERATURE: 98.6 F | HEART RATE: 82 BPM | BODY MASS INDEX: 39.38 KG/M2 | OXYGEN SATURATION: 97 %

## 2022-03-23 DIAGNOSIS — I10 PRIMARY HYPERTENSION: Primary | ICD-10-CM

## 2022-03-23 PROCEDURE — 99213 OFFICE O/P EST LOW 20 MIN: CPT | Performed by: INTERNAL MEDICINE

## 2022-03-23 RX ORDER — METOPROLOL SUCCINATE 100 MG/1
100 TABLET, EXTENDED RELEASE ORAL DAILY
Qty: 30 TABLET | Refills: 11 | Status: SHIPPED | OUTPATIENT
Start: 2022-03-23 | End: 2022-04-19

## 2022-03-23 NOTE — PROGRESS NOTES
Subjective   Charity Orr is a 51 y.o. female.     Chief Complaint   Patient presents with   • Hypertension       History of Present Illness   Caregiver, Garcia, was present during the history-taking and subsequent discussion (and for part of the physical exam) with this patient.  Patient agrees to the presence of the individual during this visit.     Follow-up for hypertension.  Currently has been feeling well and asymptomatic without any headaches,vision changes, cough, chest pain, shortness of breath, swelling, focal neurologic deficit, memory loss or syncope.  Has been taking the medications regularly and adherent with the regimen of amlodipine 5 mg and metoprolol succinate XL 50 mg.  Denies medication side effects and no significant interval events other than she has had some mild elevated blood pressure of 144/84.      The following portions of the patient's history were reviewed and updated as appropriate: allergies, current medications, past family history, past medical history, past social history, past surgical history and problem list.    Depression Screen:  PHQ-2/PHQ-9 Depression Screening 1/5/2021   Retired Total Score 0       Past Medical History:   Diagnosis Date   • Allergic rhinitis    • Back pain    • Dietary calcium deficiency    • Esophageal reflux    • Essential hypertriglyceridemia    • Flu vaccine need    • Foot pain, left    • GERD (gastroesophageal reflux disease)    • History of allergy    • Hyperlipidemia    • Hypertension    • Left foot pain    • Leukocytosis    • Lumbar muscle pain    • Mild mental retardation    • Mild mental retardation    • Pain    • Schizophrenic disorder (HCC)    • Soft tissue mass    • Sprain of ankle    • Tinea pedis    • Tremor due to multiple drugs    • UTI (urinary tract infection)        History reviewed. No pertinent surgical history.    Family History   Problem Relation Age of Onset   • No Known Problems Mother        Social History     Socioeconomic History    • Marital status: Single   Tobacco Use   • Smoking status: Never Smoker   • Smokeless tobacco: Never Used   Substance and Sexual Activity   • Alcohol use: No   • Drug use: No       Current Outpatient Medications   Medication Sig Dispense Refill   • amLODIPine (NORVASC) 5 MG tablet TAKE ONE TABLET BY MOUTH DAILY 30 tablet 11   • ascorbic acid (VITAMIN C) 500 MG tablet TAKE ONE TABLET BY MOUTH DAILY 30 tablet 11   • Ascorbic Acid 500 MG capsule Take  by mouth.     • benztropine (COGENTIN) 1 MG tablet Take 1 tablet by mouth 2 (Two) Times a Day. 180 tablet 0   • Calcium Carb-Cholecalciferol 600-400 MG-UNIT tablet TAKE ONE TABLET BY MOUTH DAILY 30 tablet 11   • cetirizine (zyrTEC) 10 MG tablet Take 1 tablet by mouth Daily. 90 tablet 3   • chlorhexidine (PERIDEX) 0.12 % solution BRUSH TEETH WITH ONE-HALF OUNCE EVERY MORNING AND IN THE EVENING 473 mL 11   • CORICIDIN 2-325 MG tablet TAKE ONE TABLET BY MOUTH EVERY EIGHT HOURS AS NEEDED 20 tablet 11   • divalproex (DEPAKOTE) 500 MG DR tablet Take 1,000 mg by mouth 2 (Two) Times a Day.  2   • fluticasone (FLONASE) 50 MCG/ACT nasal spray INSTILL ONE SPRAY IN EACH NOSTRIL DAILY 16 g 11   • gemfibrozil (LOPID) 600 MG tablet Take 600 mg by mouth.     • haloperidol (HALDOL) 10 MG tablet Take 10 mg by mouth 2 (Two) Times a Day.  2   • hydrocortisone 1 % cream APPLY TO AFFECTED AREA TWICE DAILY AS NEEDED 28.35 g 11   • ibuprofen (ADVIL,MOTRIN) 200 MG tablet TAKE ONE TABLET BY MOUTH EVERY 6 HOURS AS NEEDED FOR PAIN 60 tablet 11   • LORazepam (ATIVAN) 1 MG tablet TAKE ONE TABLET BY MOUTH TWICE DAILY 56 tablet 2   • metoprolol succinate XL (TOPROL-XL) 100 MG 24 hr tablet Take 1 tablet by mouth Daily. 30 tablet 11   • MIDOL TEEN 500-25 MG tablet TAKE ONE TABLET BY MOUTH EVERY 6 TO 8 HOURS AS NEEDED MENSTRUAL CRAMPS  11   • mirtazapine (REMERON) 7.5 MG tablet TAKE ONE TABLET BY MOUTH AT BEDTIME 30 tablet 5   • norgestimate-ethinyl estradiol (ORTHO TRI-CYCLEN LO) 0.18/0.215/0.25 MG-25  MCG per tablet Take  by mouth.     • omeprazole (priLOSEC) 40 MG capsule TAKE ONE CAPSULE DAILY 28 capsule 2   • One-Daily Multi-Vitamin tablet tablet TAKE ONE TABLET BY MOUTH DAILY 30 tablet 11   • perphenazine (TRILAFON) 8 MG tablet Take 8 mg by mouth.     • Sodium Fluoride 5000 Plus 1.1 % cream USE TO BRUSH TEETH ONCE DAILY 51 g 5   • Sore Throat 15-2.6 MG lozenge lozenge DISSOLVE IN MOUTH AS NEEDED FOR THROAT PAIN 32 lozenge 11   • Stool Softener Laxative 100 MG capsule TAKE ONE CAPSULE BY MOUTH DAILY 30 capsule 11   • traZODone (DESYREL) 50 MG tablet Take 50 mg by mouth every night at bedtime.  2   • Tri-Estarylla 0.18/0.215/0.25 MG-35 MCG per tablet TAKE ONE TABLET BY MOUTH DAILY 28 tablet 11     No current facility-administered medications for this visit.       Review of Systems   Constitutional: Negative for activity change, appetite change, fatigue, fever, unexpected weight gain and unexpected weight loss.   HENT: Negative for nosebleeds, rhinorrhea, trouble swallowing and voice change.    Eyes: Negative for visual disturbance.   Respiratory: Negative for cough, chest tightness, shortness of breath and wheezing.    Cardiovascular: Negative for chest pain, palpitations and leg swelling.   Gastrointestinal: Negative for abdominal pain, blood in stool, constipation, diarrhea, nausea, vomiting, GERD and indigestion.   Genitourinary: Negative for dysuria, frequency and hematuria.   Musculoskeletal: Negative for arthralgias, back pain and myalgias.   Skin: Negative for rash and wound.   Neurological: Negative for dizziness, tremors, weakness, light-headedness, numbness, headache and memory problem.   Hematological: Negative for adenopathy. Does not bruise/bleed easily.   Psychiatric/Behavioral: Negative for sleep disturbance and depressed mood. The patient is not nervous/anxious.        Objective   /92 (BP Location: Left arm, Patient Position: Sitting, Cuff Size: Large Adult)   Pulse 82   Temp 98.6 °F (37  "°C) (Temporal)   Ht 157.5 cm (62.01\")   Wt 97.1 kg (214 lb)   SpO2 97%   BMI 39.13 kg/m²     Physical Exam  Vitals and nursing note reviewed.   Constitutional:       General: She is not in acute distress.     Appearance: She is well-developed. She is not diaphoretic.   HENT:      Head: Normocephalic and atraumatic.      Right Ear: External ear normal.      Left Ear: External ear normal.      Nose: Nose normal.   Eyes:      Conjunctiva/sclera: Conjunctivae normal.      Pupils: Pupils are equal, round, and reactive to light.   Neck:      Thyroid: No thyromegaly.      Trachea: No tracheal deviation.   Cardiovascular:      Rate and Rhythm: Normal rate and regular rhythm.      Heart sounds: Normal heart sounds. No murmur heard.    No friction rub. No gallop.   Pulmonary:      Effort: Pulmonary effort is normal. No respiratory distress.      Breath sounds: Normal breath sounds.   Abdominal:      General: Bowel sounds are normal.      Palpations: Abdomen is soft. There is no mass.      Tenderness: There is no abdominal tenderness. There is no guarding.   Musculoskeletal:         General: Normal range of motion.      Cervical back: Normal range of motion and neck supple.   Lymphadenopathy:      Cervical: No cervical adenopathy.   Skin:     General: Skin is warm and dry.      Capillary Refill: Capillary refill takes less than 2 seconds.      Findings: No rash.   Neurological:      Mental Status: She is alert and oriented to person, place, and time.      Motor: No abnormal muscle tone.      Deep Tendon Reflexes: Reflexes normal.   Psychiatric:         Behavior: Behavior normal.         Thought Content: Thought content normal.         Judgment: Judgment normal.         No results found for this or any previous visit (from the past 2016 hour(s)).  Assessment/Plan   Diagnoses and all orders for this visit:    1. Primary hypertension (Primary)    Other orders  -     metoprolol succinate XL (TOPROL-XL) 100 MG 24 hr tablet; " Take 1 tablet by mouth Daily.  Dispense: 30 tablet; Refill: 11      Hypertension is not ideally controlled.  Will increase the metoprolol to 100mg daily and continue the current amlodipine at 5 mg daily.  Continue the current medications otherwise.  Follow-up in 1 month to recheck the blood pressure.  Continue to monitor the blood pressure within the group home.         · COVID-19 Precautions - Patient was compliant in wearing a mask. When I saw the patient, I used appropriate personal protective equipment (PPE) including mask and eye shield (standard procedure).  Additionally, I used gown and gloves if indicated.  Hand hygiene was completed before and after seeing the patient.  · Dictated utilizing Dragon Dictation

## 2022-04-04 RX ORDER — NORGESTIMATE AND ETHINYL ESTRADIOL 7DAYSX3 28
KIT ORAL
Qty: 28 TABLET | Refills: 11 | Status: SHIPPED | OUTPATIENT
Start: 2022-04-04 | End: 2022-10-19

## 2022-04-15 DIAGNOSIS — F25.9 SCHIZOAFFECTIVE DISORDER, UNSPECIFIED TYPE: ICD-10-CM

## 2022-04-15 DIAGNOSIS — F20.9 SCHIZOPHRENIC DISORDER: ICD-10-CM

## 2022-04-15 DIAGNOSIS — H65.91 FLUID LEVEL BEHIND TYMPANIC MEMBRANE OF RIGHT EAR: ICD-10-CM

## 2022-04-15 NOTE — TELEPHONE ENCOUNTER
Rx Refill Note  Requested Prescriptions     Pending Prescriptions Disp Refills   • LORazepam (ATIVAN) 1 MG tablet [Pharmacy Med Name: lorazepam 1 mg tablet] 56 tablet 2     Sig: TAKE ONE TABLET BY MOUTH TWICE DAILY   • cetirizine (zyrTEC) 10 MG tablet [Pharmacy Med Name: cetirizine 10 mg tablet] 90 tablet 3     Sig: TAKE ONE TABLET BY MOUTH DAILY      Last office visit with prescribing clinician: 3/23/2022      Next office visit with prescribing clinician: 4/27/2022       Richa Banegas MA  04/15/22, 14:23 EDT

## 2022-04-18 RX ORDER — LORAZEPAM 1 MG/1
TABLET ORAL
Qty: 56 TABLET | Refills: 2 | Status: SHIPPED | OUTPATIENT
Start: 2022-04-18 | End: 2022-07-06

## 2022-04-18 RX ORDER — CETIRIZINE HYDROCHLORIDE 10 MG/1
TABLET ORAL
Qty: 90 TABLET | Refills: 3 | Status: SHIPPED | OUTPATIENT
Start: 2022-04-18 | End: 2023-03-20

## 2022-04-19 RX ORDER — DIAPER,BRIEF,INFANT-TODD,DISP
EACH MISCELLANEOUS
Qty: 28.35 G | Refills: 11 | Status: SHIPPED | OUTPATIENT
Start: 2022-04-19

## 2022-04-19 RX ORDER — METOPROLOL SUCCINATE 100 MG/1
TABLET, EXTENDED RELEASE ORAL
Qty: 30 TABLET | Refills: 11 | Status: SHIPPED | OUTPATIENT
Start: 2022-04-19 | End: 2023-01-23

## 2022-04-19 NOTE — TELEPHONE ENCOUNTER
Rx Refill Note  Requested Prescriptions     Pending Prescriptions Disp Refills   • hydrocortisone 1 % cream [Pharmacy Med Name: hydrocortisone 1 % topical cream] 28.35 g 11     Sig: APPLY TO AFFECTED AREA TWICE DAILY AS NEEDED   • metoprolol succinate XL (TOPROL-XL) 100 MG 24 hr tablet [Pharmacy Med Name: metoprolol succinate  mg tablet,extended release 24 hr] 30 tablet 11     Sig: TAKE ONE TABLET BY MOUTH EVERY DAY      Last office visit with prescribing clinician: 3/23/2022      Next office visit with prescribing clinician: 4/27/2022       Richa Banegas MA  04/19/22, 14:19 EDT

## 2022-04-27 ENCOUNTER — OFFICE VISIT (OUTPATIENT)
Dept: FAMILY MEDICINE CLINIC | Facility: CLINIC | Age: 52
End: 2022-04-27

## 2022-04-27 VITALS
OXYGEN SATURATION: 93 % | WEIGHT: 208 LBS | HEART RATE: 78 BPM | TEMPERATURE: 98 F | HEIGHT: 62 IN | SYSTOLIC BLOOD PRESSURE: 128 MMHG | BODY MASS INDEX: 38.28 KG/M2 | DIASTOLIC BLOOD PRESSURE: 80 MMHG

## 2022-04-27 DIAGNOSIS — I10 PRIMARY HYPERTENSION: Primary | ICD-10-CM

## 2022-04-27 PROCEDURE — 99213 OFFICE O/P EST LOW 20 MIN: CPT | Performed by: INTERNAL MEDICINE

## 2022-04-27 NOTE — PROGRESS NOTES
Subjective   Charity Orr is a 51 y.o. female.     Chief Complaint   Patient presents with   • Hypertension       History of Present Illness   Caregiver, Garcia, was present during the history-taking and subsequent discussion (and for part of the physical exam) with this patient.  Patient agrees to the presence of the individual during this visit.     Follow-up for hypertension.  Currently has been feeling well and asymptomatic without any headaches,vision changes, cough, chest pain, shortness of breath, swelling, focal neurologic deficit, memory loss or syncope.  Has been taking the medications regularly and adherent with the regimen of amlodipine 5 mg and metoprolol succinate XL 50 mg.  Denies medication side effects and no significant interval events. Home blood pressures of 129/91,126/87,130/90..         The following portions of the patient's history were reviewed and updated as appropriate: allergies, current medications, past family history, past medical history, past social history, past surgical history and problem list.    Depression Screen:  PHQ-2/PHQ-9 Depression Screening 1/5/2021   Retired Total Score 0       Past Medical History:   Diagnosis Date   • Allergic rhinitis    • Back pain    • Dietary calcium deficiency    • Esophageal reflux    • Essential hypertriglyceridemia    • Flu vaccine need    • Foot pain, left    • GERD (gastroesophageal reflux disease)    • History of allergy    • Hyperlipidemia    • Hypertension    • Left foot pain    • Leukocytosis    • Lumbar muscle pain    • Mild mental retardation    • Mild mental retardation    • Pain    • Schizophrenic disorder (HCC)    • Soft tissue mass    • Sprain of ankle    • Tinea pedis    • Tremor due to multiple drugs    • UTI (urinary tract infection)        History reviewed. No pertinent surgical history.    Family History   Problem Relation Age of Onset   • No Known Problems Mother        Social History     Socioeconomic History   • Marital  status: Single   Tobacco Use   • Smoking status: Never Smoker   • Smokeless tobacco: Never Used   Substance and Sexual Activity   • Alcohol use: No   • Drug use: No       Current Outpatient Medications   Medication Sig Dispense Refill   • amLODIPine (NORVASC) 5 MG tablet TAKE ONE TABLET BY MOUTH DAILY 30 tablet 11   • ascorbic acid (VITAMIN C) 500 MG tablet TAKE ONE TABLET BY MOUTH DAILY 30 tablet 11   • Ascorbic Acid 500 MG capsule Take  by mouth.     • benztropine (COGENTIN) 1 MG tablet Take 1 tablet by mouth 2 (Two) Times a Day. 180 tablet 0   • Calcium Carb-Cholecalciferol 600-400 MG-UNIT tablet TAKE ONE TABLET BY MOUTH DAILY 30 tablet 11   • cetirizine (zyrTEC) 10 MG tablet TAKE ONE TABLET BY MOUTH DAILY 90 tablet 3   • chlorhexidine (PERIDEX) 0.12 % solution BRUSH TEETH WITH ONE-HALF OUNCE EVERY MORNING AND IN THE EVENING 473 mL 11   • CORICIDIN 2-325 MG tablet TAKE ONE TABLET BY MOUTH EVERY EIGHT HOURS AS NEEDED 20 tablet 11   • divalproex (DEPAKOTE) 500 MG DR tablet Take 1,000 mg by mouth 2 (Two) Times a Day.  2   • fluticasone (FLONASE) 50 MCG/ACT nasal spray INSTILL ONE SPRAY IN EACH NOSTRIL DAILY 16 g 11   • gemfibrozil (LOPID) 600 MG tablet Take 600 mg by mouth.     • haloperidol (HALDOL) 10 MG tablet Take 10 mg by mouth 2 (Two) Times a Day.  2   • hydrocortisone 1 % cream APPLY TO AFFECTED AREA TWICE DAILY AS NEEDED 28.35 g 11   • ibuprofen (ADVIL,MOTRIN) 200 MG tablet TAKE ONE TABLET BY MOUTH EVERY 6 HOURS AS NEEDED FOR PAIN 60 tablet 11   • LORazepam (ATIVAN) 1 MG tablet TAKE ONE TABLET BY MOUTH TWICE DAILY 56 tablet 2   • metoprolol succinate XL (TOPROL-XL) 100 MG 24 hr tablet TAKE ONE TABLET BY MOUTH EVERY DAY 30 tablet 11   • MIDOL TEEN 500-25 MG tablet TAKE ONE TABLET BY MOUTH EVERY 6 TO 8 HOURS AS NEEDED MENSTRUAL CRAMPS  11   • mirtazapine (REMERON) 7.5 MG tablet TAKE ONE TABLET BY MOUTH AT BEDTIME 30 tablet 5   • norgestimate-ethinyl estradiol (ORTHO TRI-CYCLEN LO) 0.18/0.215/0.25 MG-25 MCG per  tablet Take  by mouth.     • omeprazole (priLOSEC) 40 MG capsule TAKE ONE CAPSULE DAILY 28 capsule 2   • One-Daily Multi-Vitamin tablet tablet TAKE ONE TABLET BY MOUTH DAILY 30 tablet 11   • perphenazine (TRILAFON) 8 MG tablet Take 8 mg by mouth.     • Sodium Fluoride 5000 Plus 1.1 % cream USE TO BRUSH TEETH ONCE DAILY 51 g 5   • Sore Throat 15-2.6 MG lozenge lozenge DISSOLVE IN MOUTH AS NEEDED FOR THROAT PAIN 32 lozenge 11   • Stool Softener Laxative 100 MG capsule TAKE ONE CAPSULE BY MOUTH DAILY 30 capsule 11   • traZODone (DESYREL) 50 MG tablet Take 50 mg by mouth every night at bedtime.  2   • Tri-Estarylla 0.18/0.215/0.25 MG-35 MCG per tablet TAKE ONE TABLET BY MOUTH DAILY 28 tablet 11     No current facility-administered medications for this visit.       Review of Systems   Constitutional: Negative for activity change, appetite change, fatigue, fever, unexpected weight gain and unexpected weight loss.   HENT: Negative for nosebleeds, rhinorrhea, trouble swallowing and voice change.    Eyes: Negative for visual disturbance.   Respiratory: Negative for cough, chest tightness, shortness of breath and wheezing.    Cardiovascular: Negative for chest pain, palpitations and leg swelling.   Gastrointestinal: Negative for abdominal pain, blood in stool, constipation, diarrhea, nausea, vomiting, GERD and indigestion.   Genitourinary: Negative for dysuria, frequency and hematuria.   Musculoskeletal: Negative for arthralgias, back pain and myalgias.   Skin: Negative for rash and wound.   Neurological: Negative for dizziness, tremors, weakness, light-headedness, numbness, headache and memory problem.   Hematological: Negative for adenopathy. Does not bruise/bleed easily.   Psychiatric/Behavioral: Negative for sleep disturbance and depressed mood. The patient is not nervous/anxious.        Objective   /80 (BP Location: Left arm, Patient Position: Sitting, Cuff Size: Large Adult)   Pulse 78   Temp 98 °F (36.7 °C)  "(Temporal)   Ht 157.5 cm (62.01\")   Wt 94.3 kg (208 lb)   SpO2 93%   BMI 38.03 kg/m²     Physical Exam  Vitals and nursing note reviewed.   Constitutional:       General: She is not in acute distress.     Appearance: She is well-developed. She is not diaphoretic.   HENT:      Head: Normocephalic and atraumatic.      Right Ear: External ear normal.      Left Ear: External ear normal.      Nose: Nose normal.   Eyes:      Conjunctiva/sclera: Conjunctivae normal.      Pupils: Pupils are equal, round, and reactive to light.   Neck:      Thyroid: No thyromegaly.      Trachea: No tracheal deviation.   Cardiovascular:      Rate and Rhythm: Normal rate and regular rhythm.      Heart sounds: Normal heart sounds. No murmur heard.    No friction rub. No gallop.   Pulmonary:      Effort: Pulmonary effort is normal. No respiratory distress.      Breath sounds: Normal breath sounds.   Abdominal:      General: Bowel sounds are normal.      Palpations: Abdomen is soft. There is no mass.      Tenderness: There is no abdominal tenderness. There is no guarding.   Musculoskeletal:         General: Normal range of motion.      Cervical back: Normal range of motion and neck supple.   Lymphadenopathy:      Cervical: No cervical adenopathy.   Skin:     General: Skin is warm and dry.      Capillary Refill: Capillary refill takes less than 2 seconds.      Findings: No rash.   Neurological:      Mental Status: She is alert and oriented to person, place, and time.      Motor: No abnormal muscle tone.      Deep Tendon Reflexes: Reflexes normal.   Psychiatric:         Behavior: Behavior normal.         Thought Content: Thought content normal.         Judgment: Judgment normal.         No results found for this or any previous visit (from the past 2016 hour(s)).  Assessment/Plan   Diagnoses and all orders for this visit:    1. Primary hypertension (Primary)    BP is controlled.  Continue the current medication unchanged and follow up in 3 " months or earlier if needed.           · COVID-19 Precautions - Patient was compliant in wearing a mask. When I saw the patient, I used appropriate personal protective equipment (PPE) including mask and eye shield (standard procedure).  Additionally, I used gown and gloves if indicated.  Hand hygiene was completed before and after seeing the patient.  · Dictated utilizing Dragon Dictation

## 2022-06-10 RX ORDER — OMEPRAZOLE 40 MG/1
CAPSULE, DELAYED RELEASE ORAL
Qty: 28 CAPSULE | Refills: 2 | Status: SHIPPED | OUTPATIENT
Start: 2022-06-10 | End: 2022-09-01

## 2022-06-10 RX ORDER — AMLODIPINE BESYLATE 5 MG/1
TABLET ORAL
Qty: 30 TABLET | Refills: 11 | Status: SHIPPED | OUTPATIENT
Start: 2022-06-10

## 2022-06-10 RX ORDER — ASCORBIC ACID 500 MG
TABLET ORAL
Qty: 30 TABLET | Refills: 11 | Status: SHIPPED | OUTPATIENT
Start: 2022-06-10

## 2022-06-10 RX ORDER — MULTIVITAMIN
TABLET ORAL
Qty: 30 TABLET | Refills: 11 | Status: SHIPPED | OUTPATIENT
Start: 2022-06-10

## 2022-06-10 RX ORDER — DOCUSATE SODIUM 100 MG/1
CAPSULE, LIQUID FILLED ORAL
Qty: 30 CAPSULE | Refills: 11 | Status: SHIPPED | OUTPATIENT
Start: 2022-06-10 | End: 2022-11-01 | Stop reason: ALTCHOICE

## 2022-06-10 NOTE — TELEPHONE ENCOUNTER
Rx Refill Note  Requested Prescriptions     Pending Prescriptions Disp Refills   • amLODIPine (NORVASC) 5 MG tablet [Pharmacy Med Name: amlodipine 5 mg tablet] 30 tablet 11     Sig: TAKE ONE TABLET BY MOUTH DAILY   • docusate sodium (COLACE) 100 MG capsule [Pharmacy Med Name: docusate sodium 100 mg capsule] 30 capsule 11     Sig: TAKE ONE CAPSULE BY MOUTH DAILY   • ascorbic acid (VITAMIN C) 500 MG tablet [Pharmacy Med Name: ascorbic acid (vitamin C) 500 mg tablet] 30 tablet 11     Sig: TAKE ONE TABLET BY MOUTH DAILY   • Calcium Carb-Cholecalciferol 600-400 MG-UNIT tablet [Pharmacy Med Name: calcium carbonate 600 mg-vitamin D3 10 mcg (400 unit) tablet] 30 tablet 11     Sig: TAKE ONE TABLET BY MOUTH DAILY   • One-Daily Multi-Vitamin tablet tablet [Pharmacy Med Name: One Daily Multivitamin tablet] 30 tablet 11     Sig: TAKE ONE TABLET BY MOUTH DAILY   • omeprazole (priLOSEC) 40 MG capsule [Pharmacy Med Name: omeprazole 40 mg capsule,delayed release] 28 capsule 2     Sig: TAKE ONE CAPSULE DAILY      Last office visit with prescribing clinician: 4/27/2022      Next office visit with prescribing clinician: 7/26/2022            Regina Knott MA  06/10/22, 14:29 EDT

## 2022-07-05 RX ORDER — CHLORHEXIDINE GLUCONATE 0.12 MG/ML
RINSE ORAL
Qty: 473 ML | Refills: 11 | Status: SHIPPED | OUTPATIENT
Start: 2022-07-05

## 2022-07-05 RX ORDER — FLUTICASONE PROPIONATE 50 MCG
SPRAY, SUSPENSION (ML) NASAL
Qty: 16 G | Refills: 11 | Status: SHIPPED | OUTPATIENT
Start: 2022-07-05 | End: 2023-02-01

## 2022-07-05 NOTE — TELEPHONE ENCOUNTER
Rx Refill Note  Requested Prescriptions     Pending Prescriptions Disp Refills   • fluticasone (FLONASE) 50 MCG/ACT nasal spray [Pharmacy Med Name: fluticasone propionate 50 mcg/actuation nasal spray,suspension] 16 g 11     Sig: INSTILL ONE SPRAY IN EACH NOSTRIL DAILY   • chlorhexidine (PERIDEX) 0.12 % solution [Pharmacy Med Name: chlorhexidine gluconate 0.12 % mouthwash] 473 mL 11     Sig: BRUSH TEETH WITH ONE-HALF OUNCE EVERY MORNING AND IN THE EVENING      Last office visit with prescribing clinician: 4/27/2022      Next office visit with prescribing clinician: 7/26/2022     LAST REFILL   FLONASE 08/02/2021  PERIDEX 08/02/2021           Rosalia South MA  07/05/22, 10:14 EDT

## 2022-07-06 DIAGNOSIS — F25.9 SCHIZOAFFECTIVE DISORDER, UNSPECIFIED TYPE: ICD-10-CM

## 2022-07-06 DIAGNOSIS — F20.9 SCHIZOPHRENIC DISORDER: ICD-10-CM

## 2022-07-06 RX ORDER — LORAZEPAM 1 MG/1
TABLET ORAL
Qty: 56 TABLET | Refills: 2 | Status: SHIPPED | OUTPATIENT
Start: 2022-07-06 | End: 2022-10-02

## 2022-07-06 NOTE — TELEPHONE ENCOUNTER
Rx Refill Note  Requested Prescriptions     Pending Prescriptions Disp Refills   • LORazepam (ATIVAN) 1 MG tablet [Pharmacy Med Name: lorazepam 1 mg tablet] 56 tablet 2     Sig: TAKE ONE TABLET BY MOUTH TWICE DAILY      Last office visit with prescribing clinician: 4/27/2022      Next office visit with prescribing clinician: 7/26/2022     Richa Banegas MA  07/06/22, 14:08 EDT

## 2022-07-26 ENCOUNTER — OFFICE VISIT (OUTPATIENT)
Dept: FAMILY MEDICINE CLINIC | Facility: CLINIC | Age: 52
End: 2022-07-26

## 2022-07-26 VITALS
OXYGEN SATURATION: 98 % | TEMPERATURE: 97.9 F | BODY MASS INDEX: 38.09 KG/M2 | HEART RATE: 74 BPM | SYSTOLIC BLOOD PRESSURE: 132 MMHG | WEIGHT: 207 LBS | HEIGHT: 62 IN | DIASTOLIC BLOOD PRESSURE: 82 MMHG

## 2022-07-26 DIAGNOSIS — R35.0 URINARY FREQUENCY: ICD-10-CM

## 2022-07-26 DIAGNOSIS — R19.7 DIARRHEA, UNSPECIFIED TYPE: ICD-10-CM

## 2022-07-26 DIAGNOSIS — I10 PRIMARY HYPERTENSION: Primary | ICD-10-CM

## 2022-07-26 DIAGNOSIS — Z12.11 COLON CANCER SCREENING: ICD-10-CM

## 2022-07-26 PROBLEM — F88 DEVELOPMENTAL MENTAL DISORDER: Status: ACTIVE | Noted: 2022-07-26

## 2022-07-26 LAB
BILIRUB BLD-MCNC: NEGATIVE MG/DL
CLARITY, POC: CLEAR
COLOR UR: YELLOW
EXPIRATION DATE: ABNORMAL
GLUCOSE UR STRIP-MCNC: NEGATIVE MG/DL
KETONES UR QL: ABNORMAL
LEUKOCYTE EST, POC: NEGATIVE
Lab: ABNORMAL
NITRITE UR-MCNC: NEGATIVE MG/ML
PH UR: 6 [PH] (ref 5–8)
PROT UR STRIP-MCNC: ABNORMAL MG/DL
RBC # UR STRIP: NEGATIVE /UL
SP GR UR: 1.02 (ref 1–1.03)
UROBILINOGEN UR QL: NORMAL

## 2022-07-26 PROCEDURE — 99214 OFFICE O/P EST MOD 30 MIN: CPT | Performed by: INTERNAL MEDICINE

## 2022-07-26 NOTE — PROGRESS NOTES
Subjective   Charity Orr is a 51 y.o. female.     Chief Complaint   Patient presents with   • Diarrhea   • Hypertension       History of Present Illness     Caregiver, Garcia, was present during the history-taking and subsequent discussion (and for part of the physical exam) with this patient.  Patient agrees to the presence of the individual during this visit.     Follow-up for hypertension.  Currently, has been feeling well and asymptomatic without any headaches, vision changes, cough, chest pain, shortness of breath, swelling, focal neurologic deficit, memory loss or syncope.  Has been taking the medications regularly and adherent with the regimen of amlodipine 5 mg and metoprolol succinate XL 50 mg.  Denies medication side effects and no significant interval events.    Patient with BM frequently during the day mainly after eating with no blood present or pain.  Has urinary frequency and states has frequency in both urination and stooling.    The following portions of the patient's history were reviewed and updated as appropriate: allergies, current medications, past family history, past medical history, past social history, past surgical history and problem list.    Depression Screen:  PHQ-2/PHQ-9 Depression Screening 1/5/2021   Retired Total Score 0       Past Medical History:   Diagnosis Date   • Allergic rhinitis    • Back pain    • Dietary calcium deficiency    • Esophageal reflux    • Essential hypertriglyceridemia    • Flu vaccine need    • Foot pain, left    • GERD (gastroesophageal reflux disease)    • History of allergy    • Hyperlipidemia    • Hypertension    • Left foot pain    • Leukocytosis    • Lumbar muscle pain    • Mild mental retardation    • Mild mental retardation    • Pain    • Schizophrenic disorder (HCC)    • Soft tissue mass    • Sprain of ankle    • Tinea pedis    • Tremor due to multiple drugs    • UTI (urinary tract infection)        No past surgical history on file.    Family History    Problem Relation Age of Onset   • No Known Problems Mother        Social History     Socioeconomic History   • Marital status: Single   Tobacco Use   • Smoking status: Never Smoker   • Smokeless tobacco: Never Used   Substance and Sexual Activity   • Alcohol use: No   • Drug use: No       Current Outpatient Medications   Medication Sig Dispense Refill   • amLODIPine (NORVASC) 5 MG tablet TAKE ONE TABLET BY MOUTH DAILY 30 tablet 11   • ascorbic acid (VITAMIN C) 500 MG tablet TAKE ONE TABLET BY MOUTH DAILY 30 tablet 11   • Ascorbic Acid 500 MG capsule Take  by mouth.     • benztropine (COGENTIN) 1 MG tablet Take 1 tablet by mouth 2 (Two) Times a Day. 180 tablet 0   • Calcium Carb-Cholecalciferol 600-400 MG-UNIT tablet TAKE ONE TABLET BY MOUTH DAILY 30 tablet 11   • cetirizine (zyrTEC) 10 MG tablet TAKE ONE TABLET BY MOUTH DAILY 90 tablet 3   • chlorhexidine (PERIDEX) 0.12 % solution BRUSH TEETH WITH ONE-HALF OUNCE EVERY MORNING AND IN THE EVENING 473 mL 11   • CORICIDIN 2-325 MG tablet TAKE ONE TABLET BY MOUTH EVERY EIGHT HOURS AS NEEDED 20 tablet 11   • divalproex (DEPAKOTE) 500 MG DR tablet Take 1,000 mg by mouth 2 (Two) Times a Day.  2   • docusate sodium (COLACE) 100 MG capsule TAKE ONE CAPSULE BY MOUTH DAILY 30 capsule 11   • fluticasone (FLONASE) 50 MCG/ACT nasal spray INSTILL ONE SPRAY IN EACH NOSTRIL DAILY 16 g 11   • gemfibrozil (LOPID) 600 MG tablet Take 600 mg by mouth.     • haloperidol (HALDOL) 10 MG tablet Take 10 mg by mouth 2 (Two) Times a Day.  2   • hydrocortisone 1 % cream APPLY TO AFFECTED AREA TWICE DAILY AS NEEDED 28.35 g 11   • ibuprofen (ADVIL,MOTRIN) 200 MG tablet TAKE ONE TABLET BY MOUTH EVERY 6 HOURS AS NEEDED FOR PAIN 60 tablet 11   • LORazepam (ATIVAN) 1 MG tablet TAKE ONE TABLET BY MOUTH TWICE DAILY 56 tablet 2   • metoprolol succinate XL (TOPROL-XL) 100 MG 24 hr tablet TAKE ONE TABLET BY MOUTH EVERY DAY 30 tablet 11   • MIDOL TEEN 500-25 MG tablet TAKE ONE TABLET BY MOUTH EVERY 6 TO 8  HOURS AS NEEDED MENSTRUAL CRAMPS  11   • mirtazapine (REMERON) 7.5 MG tablet TAKE ONE TABLET BY MOUTH AT BEDTIME 30 tablet 5   • norgestimate-ethinyl estradiol (ORTHO TRI-CYCLEN LO) 0.18/0.215/0.25 MG-25 MCG per tablet Take  by mouth.     • omeprazole (priLOSEC) 40 MG capsule TAKE ONE CAPSULE DAILY 28 capsule 2   • One-Daily Multi-Vitamin tablet tablet TAKE ONE TABLET BY MOUTH DAILY 30 tablet 11   • perphenazine (TRILAFON) 8 MG tablet Take 8 mg by mouth.     • Sodium Fluoride 5000 Plus 1.1 % cream USE TO BRUSH TEETH ONCE DAILY 51 g 5   • Sore Throat 15-2.6 MG lozenge lozenge DISSOLVE IN MOUTH AS NEEDED FOR THROAT PAIN 32 lozenge 11   • traZODone (DESYREL) 50 MG tablet Take 50 mg by mouth every night at bedtime.  2   • Tri-Estarylla 0.18/0.215/0.25 MG-35 MCG per tablet TAKE ONE TABLET BY MOUTH DAILY 28 tablet 11     No current facility-administered medications for this visit.       Review of Systems   Constitutional: Negative for activity change, appetite change, fatigue, fever, unexpected weight gain and unexpected weight loss.   HENT: Negative for nosebleeds, rhinorrhea, trouble swallowing and voice change.    Eyes: Negative for visual disturbance.   Respiratory: Negative for cough, chest tightness, shortness of breath and wheezing.    Cardiovascular: Negative for chest pain, palpitations and leg swelling.   Gastrointestinal: Positive for diarrhea. Negative for abdominal pain, blood in stool, constipation, nausea, vomiting, GERD and indigestion.   Genitourinary: Negative for dysuria, frequency and hematuria.   Musculoskeletal: Negative for arthralgias, back pain and myalgias.   Skin: Negative for rash and wound.   Neurological: Negative for dizziness, tremors, weakness, light-headedness, numbness, headache and memory problem.   Hematological: Negative for adenopathy. Does not bruise/bleed easily.   Psychiatric/Behavioral: Negative for sleep disturbance and depressed mood. The patient is not nervous/anxious.   "      Objective   /82 (BP Location: Left arm, Patient Position: Sitting, Cuff Size: Large Adult)   Pulse 74   Temp 97.9 °F (36.6 °C) (Temporal)   Ht 157.5 cm (62.01\")   Wt 93.9 kg (207 lb)   SpO2 98%   BMI 37.85 kg/m²     Physical Exam  Vitals and nursing note reviewed.   Constitutional:       General: She is not in acute distress.     Appearance: She is well-developed. She is not diaphoretic.   HENT:      Head: Normocephalic and atraumatic.      Right Ear: External ear normal.      Left Ear: External ear normal.      Nose: Nose normal.   Eyes:      Conjunctiva/sclera: Conjunctivae normal.      Pupils: Pupils are equal, round, and reactive to light.   Neck:      Thyroid: No thyromegaly.      Trachea: No tracheal deviation.   Cardiovascular:      Rate and Rhythm: Normal rate and regular rhythm.      Heart sounds: Normal heart sounds. No murmur heard.    No friction rub. No gallop.   Pulmonary:      Effort: Pulmonary effort is normal. No respiratory distress.      Breath sounds: Normal breath sounds.   Abdominal:      General: Bowel sounds are normal.      Palpations: Abdomen is soft. There is no mass.      Tenderness: There is no abdominal tenderness. There is no guarding.   Musculoskeletal:         General: Normal range of motion.      Cervical back: Normal range of motion and neck supple.   Lymphadenopathy:      Cervical: No cervical adenopathy.   Skin:     General: Skin is warm and dry.      Capillary Refill: Capillary refill takes less than 2 seconds.      Findings: No rash.   Neurological:      Mental Status: She is alert. She is disoriented.      Motor: No abnormal muscle tone.      Deep Tendon Reflexes: Reflexes normal.      Comments: Patient talking about babies at [employer] hand inside of her which is typical for Charity.   Psychiatric:         Behavior: Behavior normal.         Thought Content: Thought content normal.         Judgment: Judgment normal.         Recent Results (from the past 2016 " hour(s))   POC Urinalysis Dipstick, Automated    Collection Time: 07/26/22 11:39 AM    Specimen: Urine   Result Value Ref Range    Color Yellow Yellow, Straw, Dark Yellow, Monse    Clarity, UA Clear Clear    Specific Gravity  1.020 1.005 - 1.030    pH, Urine 6.0 5.0 - 8.0    Leukocytes Negative Negative    Nitrite, UA Negative Negative    Protein, POC Trace (A) Negative mg/dL    Glucose, UA Negative Negative mg/dL    Ketones, UA Trace (A) Negative    Urobilinogen, UA Normal Normal    Bilirubin Negative Negative    Blood, UA Negative Negative    Lot Number [id number]     Expiration Date 12/21/2023      Assessment & Plan   Diagnoses and all orders for this visit:    1. Primary hypertension (Primary)    2. Urinary frequency  -     POC Urinalysis Dipstick, Automated    3. Diarrhea, unspecified type  -     POC Urinalysis Dipstick, Automated    4. Colon cancer screening  -     Cologuard - Stool, Per Rectum; Future    Hypertension currently well controlled.  Continue current medication unchanged.  After discussion with Garcia one of the caregivers in the home, she determined that the patient actually is only having the issues with diarrhea after she eats large meals.  She believes that she had done the Cologuard previously but we do not have any record of this.  After contacting [facility] they had never received the sample to be tested.  We will therefore reorder the Cologuard for testing.         · COVID-19 Precautions - Patient was compliant in wearing a mask. When I saw the patient, I used appropriate personal protective equipment (PPE) including mask and eye shield (standard procedure).  Additionally, I used gown and gloves if indicated.  Hand hygiene was completed before and after seeing the patient.  · Dictated utilizing Dragon Dictation

## 2022-08-01 RX ORDER — SODIUM FLUORIDE 5 MG/G
CREAM DENTAL
Qty: 51 G | Refills: 0 | Status: SHIPPED | OUTPATIENT
Start: 2022-08-01 | End: 2022-09-26

## 2022-08-01 NOTE — TELEPHONE ENCOUNTER
Rx Refill Note  Requested Prescriptions     Pending Prescriptions Disp Refills   • Sodium Fluoride 5000 Plus 1.1 % cream [Pharmacy Med Name: Sodium Fluoride 5000 Plus 1.1 % dental cream] 51 g 5     Sig: USE TO BRUSH TEETH ONCE DAILY      Last office visit with prescribing clinician: 7/26/2022      Next office visit with prescribing clinician: Visit date not found            Regina Knott MA  08/01/22, 08:23 EDT

## 2022-09-01 RX ORDER — OMEPRAZOLE 40 MG/1
CAPSULE, DELAYED RELEASE ORAL
Qty: 28 CAPSULE | Refills: 11 | Status: SHIPPED | OUTPATIENT
Start: 2022-09-01

## 2022-09-26 RX ORDER — SODIUM FLUORIDE 5 MG/G
CREAM DENTAL
Qty: 51 G | Refills: 12 | Status: SHIPPED | OUTPATIENT
Start: 2022-09-26

## 2022-09-26 NOTE — TELEPHONE ENCOUNTER
Rx Refill Note  Requested Prescriptions     Pending Prescriptions Disp Refills   • Sodium Fluoride 5000 Plus 1.1 % cream [Pharmacy Med Name: Sodium Fluoride 5000 Plus 1.1 % dental cream] 51 g 0     Sig: USE TO BRUSH TEETH ONCE DAILY      Last office visit with prescribing clinician: 7/26/22      Next office visit with prescribing clinician: Visit date not found            Regina Knott MA  09/26/22, 08:59 EDT

## 2022-09-30 DIAGNOSIS — F25.9 SCHIZOAFFECTIVE DISORDER, UNSPECIFIED TYPE: ICD-10-CM

## 2022-09-30 DIAGNOSIS — F20.9 SCHIZOPHRENIC DISORDER: ICD-10-CM

## 2022-10-02 RX ORDER — LORAZEPAM 1 MG/1
TABLET ORAL
Qty: 56 TABLET | Refills: 2 | Status: SHIPPED | OUTPATIENT
Start: 2022-10-02 | End: 2023-01-17

## 2022-10-19 ENCOUNTER — OFFICE VISIT (OUTPATIENT)
Dept: FAMILY MEDICINE CLINIC | Facility: CLINIC | Age: 52
End: 2022-10-19

## 2022-10-19 VITALS
WEIGHT: 203 LBS | HEIGHT: 62 IN | DIASTOLIC BLOOD PRESSURE: 72 MMHG | SYSTOLIC BLOOD PRESSURE: 122 MMHG | HEART RATE: 88 BPM | BODY MASS INDEX: 37.36 KG/M2 | OXYGEN SATURATION: 98 % | TEMPERATURE: 97.1 F

## 2022-10-19 DIAGNOSIS — Z23 IMMUNIZATION DUE: ICD-10-CM

## 2022-10-19 DIAGNOSIS — I10 PRIMARY HYPERTENSION: Primary | ICD-10-CM

## 2022-10-19 DIAGNOSIS — Z12.11 COLON CANCER SCREENING: ICD-10-CM

## 2022-10-19 DIAGNOSIS — Z00.00 ANNUAL PHYSICAL EXAM: ICD-10-CM

## 2022-10-19 DIAGNOSIS — K52.9 CHRONIC DIARRHEA: ICD-10-CM

## 2022-10-19 PROCEDURE — 99213 OFFICE O/P EST LOW 20 MIN: CPT | Performed by: INTERNAL MEDICINE

## 2022-10-19 PROCEDURE — 91312 COVID-19 (PFIZER) BIVALENT BOOSTER 12+YRS: CPT | Performed by: INTERNAL MEDICINE

## 2022-10-19 PROCEDURE — 0124A COVID-19 (PFIZER) BIVALENT BOOSTER 12+YRS: CPT | Performed by: INTERNAL MEDICINE

## 2022-10-19 PROCEDURE — 90686 IIV4 VACC NO PRSV 0.5 ML IM: CPT | Performed by: INTERNAL MEDICINE

## 2022-10-19 PROCEDURE — 90471 IMMUNIZATION ADMIN: CPT | Performed by: INTERNAL MEDICINE

## 2022-10-19 NOTE — PROGRESS NOTES
Subjective   Charity Orr is a 51 y.o. female.     Chief Complaint   Patient presents with   • Hypertension   • Diarrhea       History of Present Illness   Caregiver, Garcia, was present during the history-taking and subsequent discussion (and for part of the physical exam) with this patient.  Patient agrees to the presence of the individual during this visit.     Follow-up for hypertension.  Currently, has been feeling well and asymptomatic without any headaches, vision changes, cough, chest pain, shortness of breath, swelling, focal neurologic deficit, memory loss or syncope.  Has been taking the medications regularly and adherent with the regimen of amlodipine 5 mg and metoprolol succinate XL 50 mg.  Denies medication side effects and no significant interval events.    Patient with chronic diarrhea/BM frequently during the day mainly after eating with no blood present or pain.  Has urinary frequency and states has frequency in both urination and stooling.  The stool has been watery and mushy but no blood.  Unable to collect the cologaurd.  No pain.  The frequency has increased over the last month.  Typically occurs after eating things like tacos or [employer]y [employer].    The following portions of the patient's history were reviewed and updated as appropriate: allergies, current medications, past family history, past medical history, past social history, past surgical history and problem list.    Depression Screen:  PHQ-2/PHQ-9 Depression Screening 1/5/2021   Retired Total Score 0       Past Medical History:   Diagnosis Date   • Allergic rhinitis    • Back pain    • Dietary calcium deficiency    • Esophageal reflux    • Essential hypertriglyceridemia    • Flu vaccine need    • Foot pain, left    • GERD (gastroesophageal reflux disease)    • History of allergy    • Hyperlipidemia    • Hypertension    • Left foot pain    • Leukocytosis    • Lumbar muscle pain    • Mild mental retardation    • Mild mental retardation    •  Pain    • Schizophrenic disorder (HCC)    • Soft tissue mass    • Sprain of ankle    • Tinea pedis    • Tremor due to multiple drugs    • UTI (urinary tract infection)        History reviewed. No pertinent surgical history.    Family History   Problem Relation Age of Onset   • No Known Problems Mother        Social History     Socioeconomic History   • Marital status: Single   Tobacco Use   • Smoking status: Never   • Smokeless tobacco: Never   Substance and Sexual Activity   • Alcohol use: No   • Drug use: No       Current Outpatient Medications   Medication Sig Dispense Refill   • amLODIPine (NORVASC) 5 MG tablet TAKE ONE TABLET BY MOUTH DAILY 30 tablet 11   • ascorbic acid (VITAMIN C) 500 MG tablet TAKE ONE TABLET BY MOUTH DAILY 30 tablet 11   • benztropine (COGENTIN) 1 MG tablet Take 1 tablet by mouth 2 (Two) Times a Day. 180 tablet 0   • Calcium Carb-Cholecalciferol 600-400 MG-UNIT tablet TAKE ONE TABLET BY MOUTH DAILY 30 tablet 11   • cetirizine (zyrTEC) 10 MG tablet TAKE ONE TABLET BY MOUTH DAILY 90 tablet 3   • chlorhexidine (PERIDEX) 0.12 % solution BRUSH TEETH WITH ONE-HALF OUNCE EVERY MORNING AND IN THE EVENING 473 mL 11   • CORICIDIN 2-325 MG tablet TAKE ONE TABLET BY MOUTH EVERY EIGHT HOURS AS NEEDED 20 tablet 11   • divalproex (DEPAKOTE) 500 MG DR tablet Take 1,000 mg by mouth 2 (Two) Times a Day.  2   • docusate sodium (COLACE) 100 MG capsule TAKE ONE CAPSULE BY MOUTH DAILY 30 capsule 11   • fluticasone (FLONASE) 50 MCG/ACT nasal spray INSTILL ONE SPRAY IN EACH NOSTRIL DAILY 16 g 11   • haloperidol (HALDOL) 10 MG tablet Take 10 mg by mouth 2 (Two) Times a Day.  2   • hydrocortisone 1 % cream APPLY TO AFFECTED AREA TWICE DAILY AS NEEDED 28.35 g 11   • ibuprofen (ADVIL,MOTRIN) 200 MG tablet TAKE ONE TABLET BY MOUTH EVERY 6 HOURS AS NEEDED FOR PAIN 60 tablet 11   • LORazepam (ATIVAN) 1 MG tablet TAKE ONE TABLET BY MOUTH TWICE DAILY 56 tablet 2   • metoprolol succinate XL (TOPROL-XL) 100 MG 24 hr tablet  TAKE ONE TABLET BY MOUTH EVERY DAY 30 tablet 11   • MIDOL TEEN 500-25 MG tablet TAKE ONE TABLET BY MOUTH EVERY 6 TO 8 HOURS AS NEEDED MENSTRUAL CRAMPS  11   • mirtazapine (REMERON) 7.5 MG tablet TAKE ONE TABLET BY MOUTH AT BEDTIME 30 tablet 5   • norgestimate-ethinyl estradiol (ORTHO TRI-CYCLEN LO) 0.18/0.215/0.25 MG-25 MCG per tablet Take  by mouth.     • omeprazole (priLOSEC) 40 MG capsule TAKE ONE CAPSULE DAILY 28 capsule 11   • One-Daily Multi-Vitamin tablet tablet TAKE ONE TABLET BY MOUTH DAILY 30 tablet 11   • Sodium Fluoride 5000 Plus 1.1 % cream USE TO BRUSH TEETH ONCE DAILY 51 g 12   • Sore Throat 15-2.6 MG lozenge lozenge DISSOLVE IN MOUTH AS NEEDED FOR THROAT PAIN (Patient taking differently: Cepacol) 32 lozenge 11   • traZODone (DESYREL) 50 MG tablet Take 50 mg by mouth every night at bedtime.  2   • gemfibrozil (LOPID) 600 MG tablet Take 600 mg by mouth.     • perphenazine (TRILAFON) 8 MG tablet Take 8 mg by mouth.       No current facility-administered medications for this visit.       Review of Systems   Constitutional: Negative for activity change, appetite change, fatigue, fever, unexpected weight gain and unexpected weight loss.   HENT: Negative for nosebleeds, rhinorrhea, trouble swallowing and voice change.    Eyes: Negative for visual disturbance.   Respiratory: Negative for cough, chest tightness, shortness of breath and wheezing.    Cardiovascular: Negative for chest pain, palpitations and leg swelling.   Gastrointestinal: Positive for diarrhea. Negative for abdominal pain, blood in stool, constipation, nausea, vomiting, GERD and indigestion.   Genitourinary: Negative for dysuria, frequency and hematuria.   Musculoskeletal: Negative for arthralgias, back pain and myalgias.   Skin: Negative for rash and wound.   Neurological: Negative for dizziness, tremors, weakness, light-headedness, numbness, headache and memory problem.   Hematological: Negative for adenopathy. Does not bruise/bleed easily.  "  Psychiatric/Behavioral: Negative for sleep disturbance and depressed mood. The patient is not nervous/anxious.        Objective   /72   Pulse 88   Temp 97.1 °F (36.2 °C) (Infrared)   Ht 157.5 cm (62\")   Wt 92.1 kg (203 lb)   SpO2 98%   BMI 37.13 kg/m²     Physical Exam  Vitals and nursing note reviewed.   Constitutional:       General: She is not in acute distress.     Appearance: She is well-developed. She is obese. She is not diaphoretic.   HENT:      Head: Normocephalic and atraumatic.      Right Ear: External ear normal.      Left Ear: External ear normal.      Nose: Nose normal.   Eyes:      Conjunctiva/sclera: Conjunctivae normal.      Pupils: Pupils are equal, round, and reactive to light.   Neck:      Thyroid: No thyromegaly.      Trachea: No tracheal deviation.   Cardiovascular:      Rate and Rhythm: Normal rate and regular rhythm.      Heart sounds: Normal heart sounds. No murmur heard.    No friction rub. No gallop.   Pulmonary:      Effort: Pulmonary effort is normal. No respiratory distress.      Breath sounds: Normal breath sounds.   Abdominal:      General: Bowel sounds are normal.      Palpations: Abdomen is soft. There is no mass.      Tenderness: There is no abdominal tenderness. There is no guarding.   Musculoskeletal:         General: Normal range of motion.      Cervical back: Normal range of motion and neck supple.   Lymphadenopathy:      Cervical: No cervical adenopathy.   Skin:     General: Skin is warm and dry.      Capillary Refill: Capillary refill takes less than 2 seconds.      Findings: No rash.   Neurological:      Mental Status: She is alert and oriented to person, place, and time.      Motor: No abnormal muscle tone.      Deep Tendon Reflexes: Reflexes normal.         No results found for this or any previous visit (from the past 2016 hour(s)).  Assessment & Plan   Diagnoses and all orders for this visit:    1. Primary hypertension (Primary)    2. Chronic diarrhea  -    "  Ambulatory Referral to Gastroenterology    3. Colon cancer screening  -     Ambulatory Referral to Gastroenterology    4. Immunization due  -     FluLaval/Fluzone >6 mos ([id number])  -     COVID-19 Bivalent Booster (Pfizer) 12+yrs    5. Annual physical exam    Hypertension is well controlled.  Continue with the current medicines.  Chronic diarrhea will refer for gastroenterology for colon cancer screening and evaluation.  Immunizations as noted above.           · COVID-19 Precautions - Patient was compliant in wearing a mask. When I saw the patient, I used appropriate personal protective equipment (PPE) including mask and eye shield (standard procedure).  Additionally, I used gown and gloves if indicated.  Hand hygiene was completed before and after seeing the patient.  · Dictated utilizing Dragon Dictation

## 2022-11-01 ENCOUNTER — OFFICE VISIT (OUTPATIENT)
Dept: GASTROENTEROLOGY | Facility: CLINIC | Age: 52
End: 2022-11-01

## 2022-11-01 ENCOUNTER — TELEPHONE (OUTPATIENT)
Dept: GASTROENTEROLOGY | Facility: CLINIC | Age: 52
End: 2022-11-01

## 2022-11-01 VITALS
OXYGEN SATURATION: 96 % | HEART RATE: 77 BPM | WEIGHT: 205.8 LBS | TEMPERATURE: 96.9 F | BODY MASS INDEX: 37.87 KG/M2 | SYSTOLIC BLOOD PRESSURE: 137 MMHG | DIASTOLIC BLOOD PRESSURE: 90 MMHG | HEIGHT: 62 IN

## 2022-11-01 DIAGNOSIS — Z12.12 SCREENING FOR COLORECTAL CANCER: Primary | ICD-10-CM

## 2022-11-01 DIAGNOSIS — R19.7 DIARRHEA, UNSPECIFIED TYPE: ICD-10-CM

## 2022-11-01 DIAGNOSIS — Z12.11 SCREENING FOR COLORECTAL CANCER: Primary | ICD-10-CM

## 2022-11-01 PROBLEM — K52.9 CHRONIC DIARRHEA: Status: ACTIVE | Noted: 2022-11-01

## 2022-11-01 PROCEDURE — 99204 OFFICE O/P NEW MOD 45 MIN: CPT | Performed by: INTERNAL MEDICINE

## 2022-11-01 RX ORDER — BISACODYL 5 MG/1
5 TABLET, DELAYED RELEASE ORAL DAILY PRN
COMMUNITY

## 2022-11-01 RX ORDER — NORGESTIMATE AND ETHINYL ESTRADIOL 7DAYSX3 28
1 KIT ORAL DAILY
COMMUNITY
Start: 2022-10-21 | End: 2023-01-31 | Stop reason: SDUPTHER

## 2022-11-01 RX ORDER — FOLIC ACID 1 MG/1
1 TABLET ORAL DAILY
COMMUNITY

## 2022-11-01 RX ORDER — LEVETIRACETAM 500 MG/1
500 TABLET ORAL 2 TIMES DAILY
COMMUNITY
End: 2022-11-01

## 2022-11-01 RX ORDER — ESCITALOPRAM OXALATE 10 MG/1
10 TABLET ORAL DAILY
COMMUNITY
End: 2023-02-01

## 2022-11-01 RX ORDER — QUETIAPINE FUMARATE 100 MG/1
100 TABLET, FILM COATED ORAL 2 TIMES DAILY
COMMUNITY
End: 2022-11-01

## 2022-11-01 RX ORDER — CLOPIDOGREL BISULFATE 75 MG/1
75 TABLET ORAL DAILY
COMMUNITY
End: 2023-02-01

## 2022-11-01 NOTE — TELEPHONE ENCOUNTER
TRENA patient in office. Scheduled  02/02/2023 with arrival time of  1030 AM . Prep paper work given to patient. Patient advised arrival time may change based on [facility] guidelines. TRENA THORPE

## 2022-11-01 NOTE — TELEPHONE ENCOUNTER
TRENA patient in office. Scheduled  02/02/2023 with arrival time of  1030 AM . Prep paper work given to patient. Patient advised arrival time may change based on [facility] guidelines. TRENA THORPE

## 2022-11-01 NOTE — PROGRESS NOTES
Chief Complaint   Patient presents with   • Chronic Diarrhea   • Abdominal Pain        Charity Orr is a  52 y.o. female here for an initial visit for abdominal pain, chronic diarrhea    HPI 52-year-old white female patient of Dr. Washington Rodriguez with history of diarrhea, patient reports daily bowel movement ranging from soft in texture to formed.  She also complains of some mid abdominal pain of a constant nature.  It does not seem to be influenced by diet or bowel function.  She cannot recall any previous colonoscopic evaluation.  No report of melena or bright red blood per rectum.  Review of records indicates use of Plavix which would need to be held for 5 days prior to examination    Past Medical History:   Diagnosis Date   • Allergic rhinitis    • Back pain    • Dietary calcium deficiency    • Esophageal reflux    • Essential hypertriglyceridemia    • Flu vaccine need    • Foot pain, left    • GERD (gastroesophageal reflux disease)    • History of allergy    • Hyperlipidemia    • Hypertension    • Left foot pain    • Leukocytosis    • Lumbar muscle pain    • Mild mental retardation    • Mild mental retardation    • Pain    • Schizophrenic disorder (HCC)    • Soft tissue mass    • Sprain of ankle    • Tinea pedis    • Tremor due to multiple drugs    • UTI (urinary tract infection)        Current Outpatient Medications   Medication Sig Dispense Refill   • amLODIPine (NORVASC) 5 MG tablet TAKE ONE TABLET BY MOUTH DAILY 30 tablet 11   • benztropine (COGENTIN) 1 MG tablet Take 1 tablet by mouth 2 (Two) Times a Day. 180 tablet 0   • bisacodyl (DULCOLAX) 5 MG EC tablet Take 1 tablet by mouth Daily As Needed for Constipation.     • Calcium Carb-Cholecalciferol 600-400 MG-UNIT tablet TAKE ONE TABLET BY MOUTH DAILY 30 tablet 11   • cetirizine (zyrTEC) 10 MG tablet TAKE ONE TABLET BY MOUTH DAILY 90 tablet 3   • chlorhexidine (PERIDEX) 0.12 % solution BRUSH TEETH WITH ONE-HALF OUNCE EVERY MORNING AND IN THE EVENING 473 mL  11   • CORICIDIN 2-325 MG tablet TAKE ONE TABLET BY MOUTH EVERY EIGHT HOURS AS NEEDED 20 tablet 11   • divalproex (DEPAKOTE) 500 MG DR tablet Take 1,000 mg by mouth 2 (Two) Times a Day.  2   • fluticasone (FLONASE) 50 MCG/ACT nasal spray INSTILL ONE SPRAY IN EACH NOSTRIL DAILY 16 g 11   • haloperidol (HALDOL) 10 MG tablet Take 1 tablet by mouth 2 (Two) Times a Day.  2   • hydrocortisone 1 % cream APPLY TO AFFECTED AREA TWICE DAILY AS NEEDED 28.35 g 11   • ibuprofen (ADVIL,MOTRIN) 200 MG tablet TAKE ONE TABLET BY MOUTH EVERY 6 HOURS AS NEEDED FOR PAIN 60 tablet 11   • LORazepam (ATIVAN) 1 MG tablet TAKE ONE TABLET BY MOUTH TWICE DAILY 56 tablet 2   • metoprolol succinate XL (TOPROL-XL) 100 MG 24 hr tablet TAKE ONE TABLET BY MOUTH EVERY DAY 30 tablet 11   • MIDOL TEEN 500-25 MG tablet TAKE ONE TABLET BY MOUTH EVERY 6 TO 8 HOURS AS NEEDED MENSTRUAL CRAMPS  11   • mirtazapine (REMERON) 7.5 MG tablet TAKE ONE TABLET BY MOUTH AT BEDTIME 30 tablet 5   • omeprazole (priLOSEC) 40 MG capsule TAKE ONE CAPSULE DAILY 28 capsule 11   • One-Daily Multi-Vitamin tablet tablet TAKE ONE TABLET BY MOUTH DAILY 30 tablet 11   • Sodium Fluoride 5000 Plus 1.1 % cream USE TO BRUSH TEETH ONCE DAILY 51 g 12   • Sore Throat 15-2.6 MG lozenge lozenge DISSOLVE IN MOUTH AS NEEDED FOR THROAT PAIN (Patient taking differently: Cepacol) 32 lozenge 11   • traZODone (DESYREL) 50 MG tablet Take 50 mg by mouth every night at bedtime.  2   • Tri-Estarylla 0.18/0.215/0.25 MG-35 MCG per tablet Take 1 tablet by mouth Daily.     • ascorbic acid (VITAMIN C) 500 MG tablet TAKE ONE TABLET BY MOUTH DAILY 30 tablet 11   • clopidogrel (PLAVIX) 75 MG tablet Take 1 tablet by mouth Daily.     • escitalopram (LEXAPRO) 10 MG tablet Take 1 tablet by mouth Daily.     • folic acid (FOLVITE) 1 MG tablet Take 1 tablet by mouth Daily.       No current facility-administered medications for this visit.       PRN Meds:.    Allergies   Allergen Reactions   • Depo-Provera  [Medroxyprogesterone Acetate] Hives   • Penicillins Swelling   • Bee Venom Other (See Comments)     BEE STING       Social History     Socioeconomic History   • Marital status: Single   Tobacco Use   • Smoking status: Never   • Smokeless tobacco: Never   Substance and Sexual Activity   • Alcohol use: No   • Drug use: No       Family History   Problem Relation Age of Onset   • No Known Problems Mother        Review of Systems   Constitutional: Negative for activity change, appetite change, fatigue and unexpected weight change.   HENT: Negative for congestion, facial swelling, sore throat, trouble swallowing and voice change.    Eyes: Negative for photophobia and visual disturbance.   Respiratory: Negative for cough and choking.    Cardiovascular: Negative for chest pain.   Gastrointestinal: Positive for abdominal pain and diarrhea. Negative for abdominal distention, anal bleeding, blood in stool, constipation, nausea, rectal pain and vomiting.   Endocrine: Negative for polyphagia.   Musculoskeletal: Negative for arthralgias, gait problem and joint swelling.   Skin: Negative for color change, pallor and rash.   Allergic/Immunologic: Negative for food allergies.   Neurological: Negative for speech difficulty and headaches.   Hematological: Does not bruise/bleed easily.   Psychiatric/Behavioral: Negative for agitation, confusion and sleep disturbance.       Vitals:    11/01/22 1101   BP: 137/90   Pulse: 77   Temp: 96.9 °F (36.1 °C)   SpO2: 96%       Physical Exam  Vitals reviewed.   Constitutional:       General: She is not in acute distress.     Appearance: She is well-developed. She is not diaphoretic.   HENT:      Head: Normocephalic.      Mouth/Throat:      Pharynx: No oropharyngeal exudate.   Eyes:      General: No scleral icterus.     Conjunctiva/sclera: Conjunctivae normal.   Neck:      Thyroid: No thyromegaly.   Cardiovascular:      Rate and Rhythm: Normal rate and regular rhythm.      Heart sounds: No murmur  heard.  Pulmonary:      Effort: No respiratory distress.      Breath sounds: Normal breath sounds. No wheezing or rales.   Abdominal:      General: Bowel sounds are normal. There is no distension.      Palpations: Abdomen is soft. There is no mass.      Tenderness: There is no abdominal tenderness.   Musculoskeletal:         General: No tenderness. Normal range of motion.      Cervical back: Normal range of motion.   Lymphadenopathy:      Cervical: No cervical adenopathy.   Skin:     General: Skin is warm and dry.      Findings: No erythema or rash.   Neurological:      Mental Status: She is alert and oriented to person, place, and time.   Psychiatric:         Behavior: Behavior normal.      Comments: Some degree of retardation         ASSESSMENT   #1 chronic diarrhea  #2 abdominal pain  #3 screening for colorectal cancer      PLAN  Schedule colonoscopy  Hold Plavix for 5 days if okayed by prescribing MD      ICD-10-CM ICD-9-CM   1. Screening for colorectal cancer  Z12.11 V76.51    Z12.12 V76.41   2. Diarrhea, unspecified type  R19.7 787.91

## 2022-11-02 ENCOUNTER — TELEPHONE (OUTPATIENT)
Dept: GASTROENTEROLOGY | Facility: CLINIC | Age: 52
End: 2022-11-02

## 2022-11-02 NOTE — TELEPHONE ENCOUNTER
Faxed rx for colyte to [facility] with prep instructions ( drink 32 ounces at 6pm on 02/01/2023 and 32 ounces 5 hours prior to arrival) per Dr. russell instructions

## 2023-01-16 DIAGNOSIS — F20.9 SCHIZOPHRENIC DISORDER: ICD-10-CM

## 2023-01-16 DIAGNOSIS — F25.9 SCHIZOAFFECTIVE DISORDER, UNSPECIFIED TYPE: ICD-10-CM

## 2023-01-17 RX ORDER — LORAZEPAM 1 MG/1
TABLET ORAL
Qty: 56 TABLET | Refills: 2 | Status: SHIPPED | OUTPATIENT
Start: 2023-01-17

## 2023-01-17 NOTE — TELEPHONE ENCOUNTER
Rx Refill Note  Requested Prescriptions     Pending Prescriptions Disp Refills   • LORazepam (ATIVAN) 1 MG tablet [Pharmacy Med Name: lorazepam 1 mg tablet] 56 tablet 2     Sig: TAKE ONE TABLET BY MOUTH TWICE DAILY      Last office visit with prescribing clinician: 10/19/2022   Last telemedicine visit with prescribing clinician: Visit date not found   Next office visit with prescribing clinician: Visit date not found  Last refill   10/2/2022

## 2023-01-23 RX ORDER — METOPROLOL SUCCINATE 100 MG/1
TABLET, EXTENDED RELEASE ORAL
Qty: 30 TABLET | Refills: 11 | Status: SHIPPED | OUTPATIENT
Start: 2023-01-23

## 2023-01-23 NOTE — TELEPHONE ENCOUNTER
Rx Refill Note  Requested Prescriptions     Pending Prescriptions Disp Refills   • metoprolol succinate XL (TOPROL-XL) 100 MG 24 hr tablet [Pharmacy Med Name: metoprolol succinate  mg tablet,extended release 24 hr] 30 tablet 11     Sig: TAKE ONE TABLET BY MOUTH EVERY DAY      Last office visit with prescribing clinician: 10/19/2022   Last telemedicine visit with prescribing clinician: Visit date not found   Next office visit with prescribing clinician: Visit date not found     Last filled on 04/19/2022

## 2023-01-31 RX ORDER — NORGESTIMATE AND ETHINYL ESTRADIOL 7DAYSX3 28
1 KIT ORAL DAILY
Qty: 28 TABLET | Refills: 6 | Status: SHIPPED | OUTPATIENT
Start: 2023-01-31

## 2023-02-01 RX ORDER — FLUTICASONE PROPIONATE 50 MCG
2 SPRAY, SUSPENSION (ML) NASAL DAILY
COMMUNITY

## 2023-02-01 RX ORDER — HALOPERIDOL 10 MG/1
10 TABLET ORAL 2 TIMES DAILY
COMMUNITY

## 2023-02-02 ENCOUNTER — ANESTHESIA (OUTPATIENT)
Dept: GASTROENTEROLOGY | Facility: HOSPITAL | Age: 53
End: 2023-02-02
Payer: MEDICAID

## 2023-02-02 ENCOUNTER — HOSPITAL ENCOUNTER (OUTPATIENT)
Facility: HOSPITAL | Age: 53
Setting detail: HOSPITAL OUTPATIENT SURGERY
Discharge: HOME | End: 2023-02-02
Attending: INTERNAL MEDICINE | Admitting: INTERNAL MEDICINE
Payer: MEDICAID

## 2023-02-02 ENCOUNTER — ANESTHESIA EVENT (OUTPATIENT)
Dept: GASTROENTEROLOGY | Facility: HOSPITAL | Age: 53
End: 2023-02-02
Payer: MEDICAID

## 2023-02-02 VITALS
HEIGHT: 62 IN | BODY MASS INDEX: 35.88 KG/M2 | OXYGEN SATURATION: 92 % | RESPIRATION RATE: 20 BRPM | HEART RATE: 77 BPM | SYSTOLIC BLOOD PRESSURE: 136 MMHG | DIASTOLIC BLOOD PRESSURE: 92 MMHG | WEIGHT: 195 LBS

## 2023-02-02 DIAGNOSIS — K52.9 CHRONIC DIARRHEA: ICD-10-CM

## 2023-02-02 DIAGNOSIS — Z12.11 SCREENING FOR COLORECTAL CANCER: ICD-10-CM

## 2023-02-02 DIAGNOSIS — Z12.12 SCREENING FOR COLORECTAL CANCER: ICD-10-CM

## 2023-02-02 LAB
B-HCG UR QL: NEGATIVE
EXPIRATION DATE: NORMAL
INTERNAL NEGATIVE CONTROL: NORMAL
INTERNAL POSITIVE CONTROL: NORMAL
Lab: NORMAL

## 2023-02-02 PROCEDURE — S0260 H&P FOR SURGERY: HCPCS | Performed by: INTERNAL MEDICINE

## 2023-02-02 PROCEDURE — 45380 COLONOSCOPY AND BIOPSY: CPT | Performed by: INTERNAL MEDICINE

## 2023-02-02 PROCEDURE — 25010000002 PROPOFOL 10 MG/ML EMULSION: Performed by: ANESTHESIOLOGY

## 2023-02-02 PROCEDURE — 81025 URINE PREGNANCY TEST: CPT | Performed by: INTERNAL MEDICINE

## 2023-02-02 PROCEDURE — 88305 TISSUE EXAM BY PATHOLOGIST: CPT | Performed by: INTERNAL MEDICINE

## 2023-02-02 PROCEDURE — 45385 COLONOSCOPY W/LESION REMOVAL: CPT | Performed by: INTERNAL MEDICINE

## 2023-02-02 RX ORDER — PROPOFOL 10 MG/ML
VIAL (ML) INTRAVENOUS CONTINUOUS PRN
Status: DISCONTINUED | OUTPATIENT
Start: 2023-02-02 | End: 2023-02-02 | Stop reason: SURG

## 2023-02-02 RX ORDER — LIDOCAINE HYDROCHLORIDE 20 MG/ML
INJECTION, SOLUTION INFILTRATION; PERINEURAL AS NEEDED
Status: DISCONTINUED | OUTPATIENT
Start: 2023-02-02 | End: 2023-02-02 | Stop reason: SURG

## 2023-02-02 RX ORDER — SODIUM CHLORIDE, SODIUM LACTATE, POTASSIUM CHLORIDE, CALCIUM CHLORIDE 600; 310; 30; 20 MG/100ML; MG/100ML; MG/100ML; MG/100ML
1000 INJECTION, SOLUTION INTRAVENOUS CONTINUOUS
Status: DISCONTINUED | OUTPATIENT
Start: 2023-02-02 | End: 2023-02-02 | Stop reason: HOSPADM

## 2023-02-02 RX ADMIN — LIDOCAINE HYDROCHLORIDE 6 MG: 20 INJECTION, SOLUTION INFILTRATION; PERINEURAL at 11:21

## 2023-02-02 RX ADMIN — PROPOFOL 100 MCG/KG/MIN: 10 INJECTION, EMULSION INTRAVENOUS at 11:21

## 2023-02-02 RX ADMIN — SODIUM CHLORIDE, POTASSIUM CHLORIDE, SODIUM LACTATE AND CALCIUM CHLORIDE 1000 ML: 600; 310; 30; 20 INJECTION, SOLUTION INTRAVENOUS at 11:15

## 2023-02-02 NOTE — ANESTHESIA POSTPROCEDURE EVALUATION
Patient: Charity Orr    Procedure Summary     Date: 02/02/23 Room / Location:  [facility] /  [facility]    Anesthesia Start: 1120 Anesthesia Stop: 1153    Procedure: COLONOSCOPY TO CECUM AND INTO TERMINAL ILEUM WITH COLD SNARE POLYPECTOMY AND COLD BIOPSIES Diagnosis:       Chronic diarrhea      Screening for colorectal cancer      (Chronic diarrhea [K52.9])      (Screening for colorectal cancer [Z12.11, Z12.12])    Surgeons: Yovany Gr MD Provider: Glory Holbrook MD    Anesthesia Type: MAC ASA Status: 3          Anesthesia Type: MAC    Vitals  No vitals data found for the desired time range.          Post Anesthesia Care and Evaluation    Patient location during evaluation: bedside  Patient participation: complete - patient participated  Level of consciousness: awake  Pain management: adequate    Airway patency: patent  Anesthetic complications: No anesthetic complications    Cardiovascular status: acceptable  Respiratory status: acceptable  Hydration status: acceptable

## 2023-02-02 NOTE — H&P
[facility]  Pre Procedure History & Physical    Chief Complaint:   Chronic diarrhea, abdominal pain    Subjective     HPI:   This 52-year-old female presents the endoscopy suite for colonoscopic evaluation.  She has history of chronic diarrhea as well as mid abdominal pain.  No prior colonoscopy of record.    Past Medical History:   Past Medical History:   Diagnosis Date   • Allergic rhinitis    • Back pain    • Dietary calcium deficiency    • Esophageal reflux    • Essential hypertriglyceridemia    • Flu vaccine need    • Foot pain, left    • GERD (gastroesophageal reflux disease)    • History of allergy    • Hyperlipidemia    • Hypertension    • Left foot pain    • Leukocytosis    • Loose stools    • Lumbar muscle pain    • Mild mental retardation    • Mild mental retardation    • Pain    • Schizophrenic disorder (HCC)    • Soft tissue mass    • Sprain of ankle    • Tinea pedis    • Tremor due to multiple drugs    • UTI (urinary tract infection)        Past Surgical History:  Past Surgical History:   Procedure Laterality Date   • NO PAST SURGERIES         Family History:  Family History   Problem Relation Age of Onset   • No Known Problems Mother    • Malig Hyperthermia Neg Hx        Social History:   reports that she has never smoked. She has never used smokeless tobacco. She reports that she does not drink alcohol and does not use drugs.    Medications:   No medications prior to admission.       Allergies:  Depo-provera [medroxyprogesterone acetate], Penicillins, and Bee venom    ROS:    Pertinent items are noted in HPI, all other systems reviewed and negative     Objective     There were no vitals taken for this visit.    Physical Exam   Constitutional: Pt is oriented to person, place, and time and well-developed, well-nourished, and in no distress.   Mouth/Throat: Oropharynx is clear and moist.   Neck: Normal range of motion.   Cardiovascular: Normal rate, regular rhythm and normal  heart sounds.    Pulmonary/Chest: Effort normal and breath sounds normal.   Abdominal: Soft. Nontender  Skin: Skin is warm and dry.   Psychiatric: Mood, memory, affect and judgment normal.     Assessment & Plan     Diagnosis:  Chronic diarrhea  Abdominal pain    Anticipated Surgical Procedure:  Colonoscopy    The risks, benefits, and alternatives of this procedure have been discussed with the patient or the responsible party- the patient understands and agrees to proceed.

## 2023-02-02 NOTE — DISCHARGE INSTRUCTIONS
For the next 24 hours patient needs to be with a responsible adult.    For 24 hours DO NOT drive, operate machinery, appliances, drink alcohol, make important decisions or sign legal documents.    Start with a light or bland diet and advance to regular diet as tolerated.    Follow recommendations on procedure report provided by your doctor.    Call Dr Gr for problems [phone]    Problems may include but not limited to: large amounts of bleeding, trouble breathing, repeated vomiting, severe unrelieved pain, fever or chills.

## 2023-02-02 NOTE — ANESTHESIA PREPROCEDURE EVALUATION
Anesthesia Evaluation                  Airway   Mallampati: II  TM distance: >3 FB  Neck ROM: full  No difficulty expected  Dental - normal exam     Pulmonary - normal exam   Cardiovascular - normal exam    (+) hypertension, hyperlipidemia,       Neuro/Psych  (+) tremors, psychiatric history,      ROS Comment: Mild mental retardation and schizoaffective disorder  GI/Hepatic/Renal/Endo    (+)  GERD,      Musculoskeletal     (+) back pain,   Abdominal    Substance History      OB/GYN          Other                        Anesthesia Plan    ASA 3     MAC     intravenous induction     Anesthetic plan, risks, benefits, and alternatives have been provided, discussed and informed consent has been obtained with: patient.        CODE STATUS:

## 2023-02-03 LAB
LAB AP CASE REPORT: NORMAL
PATH REPORT.FINAL DX SPEC: NORMAL
PATH REPORT.GROSS SPEC: NORMAL

## 2023-02-13 ENCOUNTER — TELEPHONE (OUTPATIENT)
Dept: GASTROENTEROLOGY | Facility: CLINIC | Age: 53
End: 2023-02-13
Payer: MEDICAID

## 2023-02-13 NOTE — TELEPHONE ENCOUNTER
----- Message from Yovany LEIGH MD sent at 2/5/2023  3:10 PM EST -----  Regarding: Biopsy result  Need to review endoscopic report before commenting on biopsy results.  Please see if this has yet been filed.  ----- Message -----  From: Lab, Background User  Sent: 2/3/2023  10:53 AM EST  To: Yovany LEIGH MD

## 2023-03-27 RX ORDER — BENZOCAINE 2.6; 15 MG/1; MG/1
LOZENGE ORAL
Qty: 32 LOZENGE | Refills: 11 | Status: SHIPPED | OUTPATIENT
Start: 2023-03-27

## 2023-03-29 ENCOUNTER — OFFICE VISIT (OUTPATIENT)
Dept: FAMILY MEDICINE CLINIC | Facility: CLINIC | Age: 53
End: 2023-03-29
Payer: MEDICAID

## 2023-03-29 VITALS
HEART RATE: 80 BPM | DIASTOLIC BLOOD PRESSURE: 60 MMHG | BODY MASS INDEX: 35.37 KG/M2 | WEIGHT: 192.2 LBS | HEIGHT: 62 IN | OXYGEN SATURATION: 98 % | SYSTOLIC BLOOD PRESSURE: 106 MMHG

## 2023-03-29 DIAGNOSIS — I10 PRIMARY HYPERTENSION: Primary | ICD-10-CM

## 2023-03-29 PROCEDURE — 99213 OFFICE O/P EST LOW 20 MIN: CPT | Performed by: INTERNAL MEDICINE

## 2023-03-29 PROCEDURE — 3074F SYST BP LT 130 MM HG: CPT | Performed by: INTERNAL MEDICINE

## 2023-03-29 PROCEDURE — 1159F MED LIST DOCD IN RCRD: CPT | Performed by: INTERNAL MEDICINE

## 2023-03-29 PROCEDURE — 3078F DIAST BP <80 MM HG: CPT | Performed by: INTERNAL MEDICINE

## 2023-03-29 PROCEDURE — 1160F RVW MEDS BY RX/DR IN RCRD: CPT | Performed by: INTERNAL MEDICINE

## 2023-03-29 NOTE — PROGRESS NOTES
Subjective   Charity Orr is a 52 y.o. female.     Chief Complaint   Patient presents with   • Hypertension       History of Present Illness   Caregiver, Garcia, was present during the history-taking and subsequent discussion (and for part of the physical exam) with this patient.  Patient agrees to the presence of the individual during this visit.     Follow-up for hypertension.  Currently, has been feeling well and asymptomatic without any headaches, vision changes, cough, chest pain, shortness of breath, swelling, focal neurologic deficit, memory loss or syncope.  Has been taking the medications regularly and adherent with the regimen of amlodipine 5 mg and metoprolol succinate  mg.  Denies medication side effects and no significant interval events.  Home BP running good 109//82.     Patient with chronic diarrhea/BM frequently during the day mainly after eating with no blood present or pain.  Has urinary frequency and states has frequency in both urination and stooling.  The stool has been watery and mushy but no blood.  Unable to collect the cologaurd.  No pain.  The frequency has increased over the last month.  Typically occurs after eating certain foods or going out to eat.    The following portions of the patient's history were reviewed and updated as appropriate: allergies, current medications, past family history, past medical history, past social history, past surgical history and problem list.    Depression Screen:  PHQ-2/PHQ-9 Depression Screening 1/5/2021   Retired Total Score 0       Past Medical History:   Diagnosis Date   • Allergic rhinitis    • Back pain    • Dietary calcium deficiency    • Esophageal reflux    • Essential hypertriglyceridemia    • Flu vaccine need    • Foot pain, left    • GERD (gastroesophageal reflux disease)    • History of allergy    • Hyperlipidemia    • Hypertension    • Left foot pain    • Leukocytosis    • Loose stools    • Lumbar muscle pain    • Mild mental  retardation    • Mild mental retardation    • Pain    • Schizophrenic disorder (HCC)    • Soft tissue mass    • Sprain of ankle    • Tinea pedis    • Tremor due to multiple drugs    • UTI (urinary tract infection)        Past Surgical History:   Procedure Laterality Date   • COLONOSCOPY N/A 2/2/2023    Procedure: COLONOSCOPY TO CECUM AND INTO TERMINAL ILEUM WITH COLD SNARE POLYPECTOMY AND COLD BIOPSIES;  Surgeon: Yovany rG MD;  Location: [facility] ENDOSCOPY;  Service: Gastroenterology;  Laterality: N/A;  PRE- JENNY UMBILICAL PAIN, DIARRHEA  POST-  POLYP, HEMORRHOIDS   • NO PAST SURGERIES         Family History   Problem Relation Age of Onset   • No Known Problems Mother    • Malig Hyperthermia Neg Hx        Social History     Socioeconomic History   • Marital status: Single   Tobacco Use   • Smoking status: Never   • Smokeless tobacco: Never   Vaping Use   • Vaping Use: Never used   Substance and Sexual Activity   • Alcohol use: No   • Drug use: No       Current Outpatient Medications   Medication Sig Dispense Refill   • Acetaminophen-Pamabrom (Midol Teen) 500-25 MG tablet Take 1 tablet by mouth 3 (Three) Times a Day As Needed (cramps). 30 tablet 11   • amLODIPine (NORVASC) 5 MG tablet TAKE ONE TABLET BY MOUTH DAILY 30 tablet 11   • ascorbic acid (VITAMIN C) 500 MG tablet TAKE ONE TABLET BY MOUTH DAILY 30 tablet 11   • benztropine (COGENTIN) 1 MG tablet Take 1 tablet by mouth 2 (Two) Times a Day. 180 tablet 0   • bisacodyl (DULCOLAX) 5 MG EC tablet Take 1 tablet by mouth Daily As Needed for Constipation.     • Calcium Carb-Cholecalciferol 600-400 MG-UNIT tablet TAKE ONE TABLET BY MOUTH DAILY 30 tablet 11   • cetirizine (zyrTEC) 10 MG tablet TAKE ONE TABLET BY MOUTH DAILY 90 tablet 3   • chlorhexidine (PERIDEX) 0.12 % solution BRUSH TEETH WITH ONE-HALF OUNCE EVERY MORNING AND IN THE EVENING 473 mL 11   • CORICIDIN 2-325 MG tablet TAKE ONE TABLET BY MOUTH EVERY EIGHT HOURS AS NEEDED 20 tablet 11   • divalproex  (DEPAKOTE) 500 MG DR tablet Take 2 tablets by mouth 2 (Two) Times a Day.  2   • fluticasone (FLONASE) 50 MCG/ACT nasal spray 2 sprays into the nostril(s) as directed by provider Daily.     • folic acid (FOLVITE) 1 MG tablet Take 1 tablet by mouth Daily.     • haloperidol (HALDOL) 10 MG tablet Take 1 tablet by mouth 2 (Two) Times a Day.     • hydrocortisone 1 % cream APPLY TO AFFECTED AREA TWICE DAILY AS NEEDED 28.35 g 11   • ibuprofen (ADVIL,MOTRIN) 200 MG tablet TAKE ONE TABLET BY MOUTH EVERY 6 HOURS AS NEEDED FOR PAIN 60 tablet 11   • LORazepam (ATIVAN) 1 MG tablet TAKE ONE TABLET BY MOUTH TWICE DAILY 56 tablet 2   • metoprolol succinate XL (TOPROL-XL) 100 MG 24 hr tablet TAKE ONE TABLET BY MOUTH EVERY DAY 30 tablet 11   • mirtazapine (REMERON) 7.5 MG tablet TAKE ONE TABLET BY MOUTH AT BEDTIME 30 tablet 5   • omeprazole (priLOSEC) 40 MG capsule TAKE ONE CAPSULE DAILY 28 capsule 11   • One-Daily Multi-Vitamin tablet tablet TAKE ONE TABLET BY MOUTH DAILY 30 tablet 11   • Sodium Fluoride 5000 Plus 1.1 % cream USE TO BRUSH TEETH ONCE DAILY 51 g 12   • Sore Throat 15-2.6 MG lozenge lozenge DISSOLVE IN MOUTH AS NEEDED FOR THROAT PAIN 32 lozenge 11   • traZODone (DESYREL) 50 MG tablet Take 1 tablet by mouth every night at bedtime.  2   • Tri-Estarylla 0.18/0.215/0.25 MG-35 MCG per tablet Take 1 tablet by mouth Daily. 28 tablet 6     No current facility-administered medications for this visit.       Review of Systems   Constitutional: Negative for activity change, appetite change, fatigue, fever, unexpected weight gain and unexpected weight loss.   HENT: Negative for nosebleeds, rhinorrhea, trouble swallowing and voice change.    Eyes: Negative for visual disturbance.   Respiratory: Negative for cough, chest tightness, shortness of breath and wheezing.    Cardiovascular: Negative for chest pain, palpitations and leg swelling.   Gastrointestinal: Negative for abdominal pain, blood in stool, constipation, diarrhea, nausea,  "vomiting, GERD and indigestion.   Genitourinary: Negative for dysuria, frequency and hematuria.   Musculoskeletal: Negative for arthralgias, back pain and myalgias.   Skin: Negative for rash and wound.   Neurological: Negative for dizziness, tremors, weakness, light-headedness, numbness, headache and memory problem.   Hematological: Negative for adenopathy. Does not bruise/bleed easily.   Psychiatric/Behavioral: Negative for sleep disturbance and depressed mood. The patient is not nervous/anxious.        Objective   /60 (BP Location: Left arm, Patient Position: Sitting, Cuff Size: Large Adult)   Pulse 80   Ht 157.5 cm (62\")   Wt 87.2 kg (192 lb 3.2 oz)   SpO2 98%   BMI 35.15 kg/m²     Physical Exam  Vitals and nursing note reviewed.   Constitutional:       General: She is not in acute distress.     Appearance: She is well-developed. She is obese. She is not diaphoretic.   HENT:      Head: Normocephalic and atraumatic.      Right Ear: External ear normal.      Left Ear: External ear normal.      Nose: Nose normal.   Eyes:      Conjunctiva/sclera: Conjunctivae normal.      Pupils: Pupils are equal, round, and reactive to light.   Neck:      Thyroid: No thyromegaly.      Trachea: No tracheal deviation.   Cardiovascular:      Rate and Rhythm: Normal rate and regular rhythm.      Heart sounds: Normal heart sounds. No murmur heard.    No friction rub. No gallop.   Pulmonary:      Effort: Pulmonary effort is normal. No respiratory distress.      Breath sounds: Normal breath sounds.   Abdominal:      General: Bowel sounds are normal.      Palpations: Abdomen is soft. There is no mass.      Tenderness: There is no abdominal tenderness. There is no guarding.   Musculoskeletal:         General: Normal range of motion.      Cervical back: Normal range of motion and neck supple.   Lymphadenopathy:      Cervical: No cervical adenopathy.   Skin:     General: Skin is warm and dry.      Capillary Refill: Capillary refill " takes less than 2 seconds.      Findings: No rash.   Neurological:      Mental Status: She is alert and oriented to person, place, and time.      Motor: No abnormal muscle tone.      Deep Tendon Reflexes: Reflexes normal.   Psychiatric:         Behavior: Behavior normal.         Thought Content: Thought content normal.         Judgment: Judgment normal.       Assessment & Plan   Diagnoses and all orders for this visit:    1. Primary hypertension (Primary)    Hypertension is well controlled.  Continue current medication unchanged.  Follow-up in 6 months.           · COVID-19 Precautions - Patient was compliant in wearing a mask. When I saw the patient, I used appropriate personal protective equipment (PPE) including mask and eye shield (standard procedure).  Additionally, I used gown and gloves if indicated.  Hand hygiene was completed before and after seeing the patient.  · Dictated utilizing Dragon Dictation

## 2023-04-18 ENCOUNTER — TELEPHONE (OUTPATIENT)
Dept: FAMILY MEDICINE CLINIC | Facility: CLINIC | Age: 53
End: 2023-04-18

## 2023-04-24 DIAGNOSIS — F20.9 SCHIZOPHRENIC DISORDER: ICD-10-CM

## 2023-04-24 DIAGNOSIS — F25.9 SCHIZOAFFECTIVE DISORDER, UNSPECIFIED TYPE: ICD-10-CM

## 2023-04-24 RX ORDER — LORAZEPAM 1 MG/1
1 TABLET ORAL 2 TIMES DAILY
Qty: 56 TABLET | Refills: 2 | Status: SHIPPED | OUTPATIENT
Start: 2023-04-24

## 2023-05-15 RX ORDER — ASCORBIC ACID 500 MG
TABLET ORAL
Qty: 30 TABLET | Refills: 11 | Status: SHIPPED | OUTPATIENT
Start: 2023-05-15

## 2023-05-15 RX ORDER — DOCUSATE SODIUM 100 MG/1
CAPSULE, LIQUID FILLED ORAL
Qty: 30 CAPSULE | Refills: 11 | Status: SHIPPED | OUTPATIENT
Start: 2023-05-15

## 2023-05-15 RX ORDER — MULTIVITAMIN
TABLET ORAL
Qty: 30 TABLET | Refills: 11 | Status: SHIPPED | OUTPATIENT
Start: 2023-05-15

## 2023-05-15 RX ORDER — CALCIUM CARBONATE/VITAMIN D3 600 MG-10
TABLET ORAL
Qty: 30 TABLET | Refills: 11 | Status: SHIPPED | OUTPATIENT
Start: 2023-05-15

## 2023-05-15 RX ORDER — AMLODIPINE BESYLATE 5 MG/1
TABLET ORAL
Qty: 30 TABLET | Refills: 11 | Status: SHIPPED | OUTPATIENT
Start: 2023-05-15

## 2023-06-05 RX ORDER — CHLORHEXIDINE GLUCONATE 0.12 MG/ML
RINSE ORAL
Qty: 473 ML | Refills: 11 | Status: SHIPPED | OUTPATIENT
Start: 2023-06-05

## 2023-06-14 DIAGNOSIS — F20.9 SCHIZOPHRENIC DISORDER: ICD-10-CM

## 2023-06-14 DIAGNOSIS — F25.9 SCHIZOAFFECTIVE DISORDER, UNSPECIFIED TYPE: ICD-10-CM

## 2023-06-14 RX ORDER — IBUPROFEN 200 MG
TABLET ORAL
Qty: 60 TABLET | Refills: 11 | Status: SHIPPED | OUTPATIENT
Start: 2023-06-14

## 2023-06-14 RX ORDER — ACETAMINOPHEN AND CHLORPHENIRAMINE MALEATE 325; 2 MG/1; MG/1
TABLET, FILM COATED ORAL
Qty: 20 TABLET | Refills: 11 | Status: SHIPPED | OUTPATIENT
Start: 2023-06-14

## 2023-06-14 RX ORDER — LORAZEPAM 1 MG/1
TABLET ORAL
Qty: 56 TABLET | Refills: 0 | Status: SHIPPED | OUTPATIENT
Start: 2023-06-14

## 2023-06-14 NOTE — TELEPHONE ENCOUNTER
LOV             3/29/2023   NOV             (around 9/29/2023) for Next scheduled follow up.   Last RF       3/8/22  on both  Protocol      not met  NSAIDs Protocol Failed     Normal serum potassium in past 12 months    GFR >45 ml/min in past year    HGB greater than 10 or HCT greater than 30 in past 9 months    AST less than 55 or ALT less than 90 in past 9 month       Richa Banegas, YASIR/JAYNAR

## 2023-06-14 NOTE — TELEPHONE ENCOUNTER
LOV             3/29/2023   NOV            6/14/2023   Last RF       1/24/23  #56  rf x 2  Protocol      not met  Controlled Substance Med Protocol Failed    Urine Toxicology Performed in Last 12 Months    Controlled Substance Agreement is on file       YASIR Mcclain/SHAHID

## 2023-07-24 DIAGNOSIS — F20.9 SCHIZOPHRENIC DISORDER: ICD-10-CM

## 2023-07-24 DIAGNOSIS — F25.9 SCHIZOAFFECTIVE DISORDER, UNSPECIFIED TYPE: ICD-10-CM

## 2023-07-24 RX ORDER — LORAZEPAM 1 MG/1
1 TABLET ORAL 2 TIMES DAILY
Qty: 60 TABLET | Refills: 0 | Status: SHIPPED | OUTPATIENT
Start: 2023-07-24

## 2023-07-24 NOTE — TELEPHONE ENCOUNTER
Caller: Charity Orr    Relationship: Self    Best call back number: [phone]    Requested Prescriptions:   Requested Prescriptions     Pending Prescriptions Disp Refills    LORazepam (ATIVAN) 1 MG tablet 56 tablet 0     Sig: Take 1 tablet by mouth 2 (Two) Times a Day.        Pharmacy where request should be sent: [facility] [address] [facility] - [phone] PH - [phone] FX     Last office visit with prescribing clinician: 3/29/2023   Last telemedicine visit with prescribing clinician: Visit date not found   Next office visit with prescribing clinician: Visit date not found     Does the patient have less than a 3 day supply:  [x] Yes  [] No    Would you like a call back once the refill request has been completed: [x] Yes [] No    If the office needs to give you a call back, can they leave a voicemail: [x] Yes [] No    Shakeel Jorgensen Rep   07/24/23 09:53 EDT

## 2023-08-29 DIAGNOSIS — F20.9 SCHIZOPHRENIC DISORDER: ICD-10-CM

## 2023-08-29 DIAGNOSIS — F25.9 SCHIZOAFFECTIVE DISORDER, UNSPECIFIED TYPE: ICD-10-CM

## 2023-08-30 RX ORDER — OMEPRAZOLE 40 MG/1
CAPSULE, DELAYED RELEASE ORAL
Qty: 28 CAPSULE | Refills: 11 | Status: SHIPPED | OUTPATIENT
Start: 2023-08-30

## 2023-08-30 RX ORDER — CLOTRIMAZOLE 1 %
1 CREAM (GRAM) TOPICAL 2 TIMES DAILY
COMMUNITY
Start: 2023-07-25

## 2023-08-30 RX ORDER — LORAZEPAM 1 MG/1
TABLET ORAL
Qty: 56 TABLET | Refills: 3 | Status: SHIPPED | OUTPATIENT
Start: 2023-08-30

## 2023-08-30 RX ORDER — SODIUM FLUORIDE 5 MG/G
PASTE, DENTIFRICE DENTAL
Qty: 51 G | Refills: 12 | Status: SHIPPED | OUTPATIENT
Start: 2023-08-30

## 2023-08-30 NOTE — TELEPHONE ENCOUNTER
LOV             3/29/2023   NOV            Due @ 9/29/23  Last RF        9/26/22 Sodium Fluoride                           9/1/22  Omeprazole                       7/24/23  Lorazepam            Protocol      Not met  Return in about 6 months (around 9/29/2023) for Next scheduled follow up.    Richa Banegas, ROMANAA/LMR

## 2023-09-05 RX ORDER — NORGESTIMATE AND ETHINYL ESTRADIOL 7DAYSX3 28
KIT ORAL
Qty: 28 TABLET | Refills: 6 | Status: SHIPPED | OUTPATIENT
Start: 2023-09-05

## 2023-09-12 ENCOUNTER — TELEPHONE (OUTPATIENT)
Dept: FAMILY MEDICINE CLINIC | Facility: CLINIC | Age: 53
End: 2023-09-12

## 2023-09-12 ENCOUNTER — TELEMEDICINE (OUTPATIENT)
Dept: FAMILY MEDICINE CLINIC | Facility: CLINIC | Age: 53
End: 2023-09-12
Payer: MEDICAID

## 2023-09-12 DIAGNOSIS — U07.1 COVID-19 VIRUS INFECTION: Primary | ICD-10-CM

## 2023-09-12 PROCEDURE — 1159F MED LIST DOCD IN RCRD: CPT | Performed by: INTERNAL MEDICINE

## 2023-09-12 PROCEDURE — 1160F RVW MEDS BY RX/DR IN RCRD: CPT | Performed by: INTERNAL MEDICINE

## 2023-09-12 PROCEDURE — 99213 OFFICE O/P EST LOW 20 MIN: CPT | Performed by: INTERNAL MEDICINE

## 2023-09-12 NOTE — TELEPHONE ENCOUNTER
Caller: Charity Orr    Relationship: Self    Best call back number: [phone]    What is the best time to reach you: ANYTIME     Who are you requesting to speak with (clinical staff, provider,  specific staff member): CLINICAL STAFF     Do you know the name of the person who called: CALVIN-CAREGIVER     What was the call regarding: PATIENT CAREGIVER CALLED AND STATED PATIENT TESTED POSITIVE FOR COVID-19 ON 9/9/23. PATIENT IS COUGHING, CONGESTED ,HEADACHE AND SORE THROAT. PLEASE CALL IN SOME MEDICATION STANLEY PACKAGING.     Is it okay if the provider responds through MyChart: NO

## 2023-09-12 NOTE — PROGRESS NOTES
Subjective   Charity Orr is a 52 y.o. female.     Chief Complaint   Patient presents with    covid infection       History of Present Illness   You have chosen to receive care through a telehealth visit.  Do you consent to use a video/audio connection for your medical care today? Yes  Video visit completed utilizing [employer] video conferencing.  Patient location in group home in [facility].  [facility]  Caregiver, Emily Bazan,  was present during the history-taking and subsequent discussion (and for part of the physical exam) with this patient.  Patient agrees to the presence of the individual during this visit.    Patient began with symptoms on 9/9/2023 of sore throat, headache, cough, chills, fatigue.  Tested positive for COVID on 9/9/2023.  Denies any fevers nausea vomiting diarrhea shortness of breath or chest pain.  Lives in a group home and another individual that she lives with in rooms with is positive as well.    The following portions of the patient's history were reviewed and updated as appropriate: allergies, current medications, past family history, past medical history, past social history, past surgical history and problem list.    Depression Screen:      1/5/2021     2:13 PM   PHQ-2/PHQ-9 Depression Screening   Retired Total Score 0       Past Medical History:   Diagnosis Date    Allergic rhinitis     Back pain     Dietary calcium deficiency     Esophageal reflux     Essential hypertriglyceridemia     Flu vaccine need     Foot pain, left     GERD (gastroesophageal reflux disease)     History of allergy     Hyperlipidemia     Hypertension     Left foot pain     Leukocytosis     Loose stools     Lumbar muscle pain     Mild mental retardation     Mild mental retardation     Pain     Schizophrenic disorder     Soft tissue mass     Sprain of ankle     Tinea pedis     Tremor due to multiple drugs     UTI (urinary tract infection)        Past Surgical History:    Procedure Laterality Date    COLONOSCOPY N/A 2/2/2023    Procedure: COLONOSCOPY TO CECUM AND INTO TERMINAL ILEUM WITH COLD SNARE POLYPECTOMY AND COLD BIOPSIES;  Surgeon: Yovany Gr MD;  Location: [facility] ENDOSCOPY;  Service: Gastroenterology;  Laterality: N/A;  PRE- JENNY UMBILICAL PAIN, DIARRHEA  POST-  POLYP, HEMORRHOIDS    NO PAST SURGERIES         Family History   Problem Relation Age of Onset    No Known Problems Mother     Malig Hyperthermia Neg Hx        Social History     Socioeconomic History    Marital status: Single   Tobacco Use    Smoking status: Never    Smokeless tobacco: Never   Vaping Use    Vaping Use: Never used   Substance and Sexual Activity    Alcohol use: No    Drug use: No       Current Outpatient Medications   Medication Sig Dispense Refill    Acetaminophen-Pamabrom (Midol Teen) 500-25 MG tablet Take 1 tablet by mouth 3 (Three) Times a Day As Needed (cramps). 30 tablet 11    amLODIPine (NORVASC) 5 MG tablet TAKE ONE TABLET BY MOUTH DAILY 30 tablet 11    benztropine (COGENTIN) 1 MG tablet Take 1 tablet by mouth 2 (Two) Times a Day. 180 tablet 0    bisacodyl (DULCOLAX) 5 MG EC tablet Take 1 tablet by mouth Daily As Needed for Constipation.      Calcium + Vitamin D3 600-10 MG-MCG tablet per tablet TAKE ONE TABLET BY MOUTH DAILY 30 tablet 11    cetirizine (zyrTEC) 10 MG tablet TAKE ONE TABLET BY MOUTH DAILY 90 tablet 3    chlorhexidine (PERIDEX) 0.12 % solution BRUSH TEETH WITH ONE-HALF OUNCE EVERY MORNING AND IN THE EVENING 473 mL 11    clotrimazole (LOTRIMIN) 1 % cream Apply 1 application  topically to the appropriate area as directed 2 (Two) Times a Day. APPLY TO AFFECTED AREA      CORICIDIN 2-325 MG tablet TAKE ONE TABLET BY MOUTH EVERY EIGHT HOURS AS NEEDED 20 tablet 11    divalproex (DEPAKOTE) 500 MG DR tablet Take 2 tablets by mouth 2 (Two) Times a Day.  2    docusate sodium (COLACE) 100 MG capsule TAKE ONE CAPSULE BY MOUTH DAILY 30 capsule 11    fluticasone (FLONASE) 50  MCG/ACT nasal spray INSTILL ONE SPRAY IN EACH NOSTRIL DAILY 16 g 11    folic acid (FOLVITE) 1 MG tablet Take 1 tablet by mouth Daily.      haloperidol (HALDOL) 10 MG tablet Take 1 tablet by mouth 2 (Two) Times a Day.      hydrocortisone 1 % cream APPLY TO AFFECTED AREA TWICE DAILY AS NEEDED 28.35 g 11    ibuprofen (ADVIL,MOTRIN) 200 MG tablet TAKE ONE TABLET BY MOUTH EVERY 6 HOURS AS NEEDED FOR PAIN 60 tablet 11    LORazepam (ATIVAN) 1 MG tablet TAKE ONE TABLET BY MOUTH TWICE DAILY 56 tablet 3    metoprolol succinate XL (TOPROL-XL) 100 MG 24 hr tablet TAKE ONE TABLET BY MOUTH EVERY DAY 30 tablet 11    mirtazapine (REMERON) 7.5 MG tablet TAKE ONE TABLET BY MOUTH AT BEDTIME 30 tablet 5    Molnupiravir (LAGEVRIO) 200 MG capsule Take 4 capsules by mouth Every 12 (Twelve) Hours for 5 days. 40 capsule 0    omeprazole (priLOSEC) 40 MG capsule TAKE ONE CAPSULE DAILY 28 capsule 11    One-Daily Multi-Vitamin tablet tablet TAKE ONE TABLET BY MOUTH DAILY 30 tablet 11    Sodium Fluoride 5000 PPM 1.1 % cream USE TO BRUSH TEETH ONCE DAILY 51 g 12    Sore Throat 15-2.6 MG lozenge lozenge DISSOLVE IN MOUTH AS NEEDED FOR THROAT PAIN 32 lozenge 11    traZODone (DESYREL) 50 MG tablet Take 1 tablet by mouth every night at bedtime.  2    Tri-Estarylla 0.18/0.215/0.25 MG-35 MCG per tablet TAKE ONE TABLET BY MOUTH DAILY 28 tablet 6    vitamin C (ASCORBIC ACID) 500 MG tablet TAKE ONE TABLET BY MOUTH DAILY 30 tablet 11     No current facility-administered medications for this visit.       Review of Systems   Constitutional:  Positive for chills and fatigue. Negative for activity change, appetite change, fever, unexpected weight gain and unexpected weight loss.   HENT:  Positive for sore throat. Negative for nosebleeds, rhinorrhea, trouble swallowing and voice change.    Eyes:  Negative for visual disturbance.   Respiratory:  Positive for cough. Negative for chest tightness, shortness of breath and wheezing.    Cardiovascular:  Negative for  chest pain, palpitations and leg swelling.   Gastrointestinal:  Negative for abdominal pain, blood in stool, constipation, diarrhea, nausea, vomiting, GERD and indigestion.   Genitourinary:  Negative for dysuria, frequency and hematuria.   Musculoskeletal:  Negative for arthralgias, back pain and myalgias.   Skin:  Negative for rash and wound.   Neurological:  Positive for headache. Negative for dizziness, tremors, weakness, light-headedness, numbness and memory problem.   Hematological:  Negative for adenopathy. Does not bruise/bleed easily.   Psychiatric/Behavioral:  Negative for sleep disturbance and depressed mood. The patient is not nervous/anxious.      Objective   There were no vitals taken for this visit.    Physical Exam   Constitutional: She appears well-developed and well-nourished. No distress.   Pulmonary/Chest: Effort normal.  No respiratory distress.  Neurological: She is alert.   Skin: She is not diaphoretic.   Psychiatric:   At baseline     No results found for this or any previous visit (from the past 2016 hour(s)).  Assessment & Plan   Diagnoses and all orders for this visit:    1. COVID-19 virus infection (Primary)    Other orders  -     Molnupiravir (LAGEVRIO) 200 MG capsule; Take 4 capsules by mouth Every 12 (Twelve) Hours for 5 days.  Dispense: 40 capsule; Refill: 0    Discussed with caregiver concerning patient's condition with positive COVID and due to weight qualifies for antiviral therapy.  Unfortunately because she of her other medications Paxlovid interactions with trazodone, amlodipine, divalproex, Haldol are concerning.  We will therefore and after discussion and agreement we will utilize the Lagevrio antiviral therapy.  We discussed risk of the use under the emergency use act.  We also discussed that she should remain in quarantine for least 5 days and there for another 5 days thereafter wear a mask when she is out.  Monitor if she has any chest pain or shortness of breath the  emergency room.      Video visit completed.       I spent 23 minutes caring for Charity on this date of service. This time includes time spent by me in the following activities:preparing for the visit, obtaining and/or reviewing a separately obtained history, performing a medically appropriate examination and/or evaluation , counseling and educating the patient/family/caregiver, ordering medications, tests, or procedures, and documenting information in the medical record

## 2023-11-29 ENCOUNTER — OFFICE VISIT (OUTPATIENT)
Dept: FAMILY MEDICINE CLINIC | Facility: CLINIC | Age: 53
End: 2023-11-29
Payer: MEDICAID

## 2023-11-29 VITALS
OXYGEN SATURATION: 96 % | BODY MASS INDEX: 34.19 KG/M2 | HEIGHT: 62 IN | DIASTOLIC BLOOD PRESSURE: 78 MMHG | HEART RATE: 76 BPM | SYSTOLIC BLOOD PRESSURE: 122 MMHG | WEIGHT: 185.8 LBS

## 2023-11-29 DIAGNOSIS — Z00.00 ANNUAL PHYSICAL EXAM: Primary | ICD-10-CM

## 2023-11-29 DIAGNOSIS — E78.1 ESSENTIAL HYPERTRIGLYCERIDEMIA: ICD-10-CM

## 2023-11-29 DIAGNOSIS — R73.9 ELEVATED BLOOD SUGAR: ICD-10-CM

## 2023-11-29 DIAGNOSIS — E66.9 OBESITY (BMI 30.0-34.9): ICD-10-CM

## 2023-11-29 DIAGNOSIS — E78.2 MIXED HYPERLIPIDEMIA: ICD-10-CM

## 2023-11-29 DIAGNOSIS — I10 PRIMARY HYPERTENSION: ICD-10-CM

## 2023-11-29 PROBLEM — E66.811 OBESITY (BMI 30.0-34.9): Status: ACTIVE | Noted: 2023-11-29

## 2023-11-29 PROCEDURE — 99396 PREV VISIT EST AGE 40-64: CPT | Performed by: INTERNAL MEDICINE

## 2023-11-29 PROCEDURE — 3078F DIAST BP <80 MM HG: CPT | Performed by: INTERNAL MEDICINE

## 2023-11-29 PROCEDURE — 1160F RVW MEDS BY RX/DR IN RCRD: CPT | Performed by: INTERNAL MEDICINE

## 2023-11-29 PROCEDURE — 3074F SYST BP LT 130 MM HG: CPT | Performed by: INTERNAL MEDICINE

## 2023-11-29 PROCEDURE — 1159F MED LIST DOCD IN RCRD: CPT | Performed by: INTERNAL MEDICINE

## 2023-11-29 RX ORDER — HALOPERIDOL 5 MG/1
5 TABLET ORAL DAILY
COMMUNITY

## 2023-11-29 NOTE — LETTER
November 29, 2023     Patient: Charity Orr   YOB: 1970   Date of Visit: 11/29/2023     To whom it concerns,     Please follow blood pressure once a week.  If blood pressure is greater than 140/90 then repeat in resting position sitting with feet on the ground.  If persists then recheck 3 days in row.  If continues to be elevated contact primary care provider office.     If patient with heartburn and/or acid reflux symptoms then encourage to drink milk as needed.  Continue the omeprazole daily.     Sincerely,        Washington Rodriguez MD    CC: No Recipients

## 2023-11-29 NOTE — PROGRESS NOTES
Chief Complaint   Patient presents with    Annual Exam     She has pain in her left knee and said it hurts and her hip as well.        HPI:  Charity Orr, -1970, is a 53 y.o. female who presents for an annual physical.  Caregiver, Emily, was present during the history-taking and subsequent discussion (and for part of the physical exam) with this patient.  Patient agrees to the presence of the individual during this visit.    53-year-old with history of hypertension, GERD, hyperlipidemia with triglyceridemia, developmental delay with schizoaffective disorder.    Was seen in [facility] and had labs with [facility] with concern of A1c and labs but these are not available at this time.    Patient states she has some pains that occur in her knee and her hip and sometimes up on her shoulder this is not all the time and is not limiting her activities.    Follow-up for hypertension.  Currently, has been feeling well and asymptomatic without any headaches, vision changes, cough, chest pain, shortness of breath, swelling, focal neurologic deficit, memory loss or syncope.  Has been taking the medications regularly and adherent with the regimen of amlodipine 5 mg daily and metoprolol succinate  mg daily.  Denies medication side effects and no significant interval events.      Recent Hospitalizations:  No hospitalization(s) within the last year..    Current Medical Providers:  Patient Care Team:  Washington Rodriguez MD as PCP - General (Internal Medicine)  Oly Zaman APRN as Nurse Practitioner (Obstetrics and Gynecology)    Compared to one year ago, the patient feels her physical health is the same and her mental health is the same.    Depression Screen:      2023     2:40 PM   PHQ-2/PHQ-9 Depression Screening   Little Interest or Pleasure in Doing Things 0-->not at all   Feeling Down, Depressed or Hopeless 0-->not at all   PHQ-9: Brief Depression Severity Measure Score 0       Past  Medical/Family/Social History:  The following portions of the patient's history were reviewed and updated as appropriate: allergies, current medications, past family history, past medical history, past social history, past surgical history, and problem list.    Allergies   Allergen Reactions    Depo-Provera [Medroxyprogesterone Acetate] Hives    Penicillins Swelling    Bee Venom Other (See Comments)     BEE STING         Current Outpatient Medications:     Acetaminophen-Pamabrom (Midol Teen) 500-25 MG tablet, Take 1 tablet by mouth 3 (Three) Times a Day As Needed (cramps)., Disp: 30 tablet, Rfl: 11    amLODIPine (NORVASC) 5 MG tablet, TAKE ONE TABLET BY MOUTH DAILY, Disp: 30 tablet, Rfl: 11    benztropine (COGENTIN) 1 MG tablet, Take 1 tablet by mouth 2 (Two) Times a Day., Disp: 180 tablet, Rfl: 0    bisacodyl (DULCOLAX) 5 MG EC tablet, Take 1 tablet by mouth Daily As Needed for Constipation., Disp: , Rfl:     Calcium + Vitamin D3 600-10 MG-MCG tablet per tablet, TAKE ONE TABLET BY MOUTH DAILY, Disp: 30 tablet, Rfl: 11    cetirizine (zyrTEC) 10 MG tablet, TAKE ONE TABLET BY MOUTH DAILY, Disp: 90 tablet, Rfl: 3    chlorhexidine (PERIDEX) 0.12 % solution, BRUSH TEETH WITH ONE-HALF OUNCE EVERY MORNING AND IN THE EVENING, Disp: 473 mL, Rfl: 11    clotrimazole (LOTRIMIN) 1 % cream, Apply 1 application  topically to the appropriate area as directed 2 (Two) Times a Day. APPLY TO AFFECTED AREA, Disp: , Rfl:     CORICIDIN 2-325 MG tablet, TAKE ONE TABLET BY MOUTH EVERY EIGHT HOURS AS NEEDED, Disp: 20 tablet, Rfl: 11    divalproex (DEPAKOTE) 500 MG DR tablet, Take 2 tablets by mouth 2 (Two) Times a Day., Disp: , Rfl: 2    docusate sodium (COLACE) 100 MG capsule, TAKE ONE CAPSULE BY MOUTH DAILY, Disp: 30 capsule, Rfl: 11    fluticasone (FLONASE) 50 MCG/ACT nasal spray, INSTILL ONE SPRAY IN EACH NOSTRIL DAILY, Disp: 16 g, Rfl: 11    folic acid (FOLVITE) 1 MG tablet, Take 1 tablet by mouth Daily., Disp: , Rfl:     haloperidol  (HALDOL) 10 MG tablet, Take 1 tablet by mouth 2 (Two) Times a Day., Disp: , Rfl:     haloperidol (HALDOL) 5 MG tablet, Take 1 tablet by mouth Daily. Takes at noon, Disp: , Rfl:     hydrocortisone 1 % cream, APPLY TO AFFECTED AREA TWICE DAILY AS NEEDED, Disp: 28.35 g, Rfl: 11    ibuprofen (ADVIL,MOTRIN) 200 MG tablet, TAKE ONE TABLET BY MOUTH EVERY 6 HOURS AS NEEDED FOR PAIN, Disp: 60 tablet, Rfl: 11    LORazepam (ATIVAN) 1 MG tablet, TAKE ONE TABLET BY MOUTH TWICE DAILY, Disp: 56 tablet, Rfl: 3    metoprolol succinate XL (TOPROL-XL) 100 MG 24 hr tablet, TAKE ONE TABLET BY MOUTH EVERY DAY, Disp: 30 tablet, Rfl: 11    mirtazapine (REMERON) 7.5 MG tablet, TAKE ONE TABLET BY MOUTH AT BEDTIME, Disp: 30 tablet, Rfl: 5    omeprazole (priLOSEC) 40 MG capsule, TAKE ONE CAPSULE DAILY, Disp: 28 capsule, Rfl: 11    One-Daily Multi-Vitamin tablet tablet, TAKE ONE TABLET BY MOUTH DAILY, Disp: 30 tablet, Rfl: 11    Sodium Fluoride 5000 PPM 1.1 % cream, USE TO BRUSH TEETH ONCE DAILY, Disp: 51 g, Rfl: 12    Sore Throat 15-2.6 MG lozenge lozenge, DISSOLVE IN MOUTH AS NEEDED FOR THROAT PAIN, Disp: 32 lozenge, Rfl: 11    traZODone (DESYREL) 50 MG tablet, Take 1 tablet by mouth every night at bedtime., Disp: , Rfl: 2    Tri-Estarylla 0.18/0.215/0.25 MG-35 MCG per tablet, TAKE ONE TABLET BY MOUTH DAILY, Disp: 28 tablet, Rfl: 6    vitamin C (ASCORBIC ACID) 500 MG tablet, TAKE ONE TABLET BY MOUTH DAILY, Disp: 30 tablet, Rfl: 11    Current medication list contains high risk medications. Some potential harmful drug interactions identified. Plan of action: Discussed with caregiver concerning her medications and possible interactions with increased risk for sedation and somnolence and falls.    Family History   Problem Relation Age of Onset    No Known Problems Mother     Malig Hyperthermia Neg Hx        Social History     Tobacco Use    Smoking status: Never    Smokeless tobacco: Never   Substance Use Topics    Alcohol use: No       Past  Surgical History:   Procedure Laterality Date    COLONOSCOPY N/A 2/2/2023    Procedure: COLONOSCOPY TO CECUM AND INTO TERMINAL ILEUM WITH COLD SNARE POLYPECTOMY AND COLD BIOPSIES;  Surgeon: Yovany Gr MD;  Location: [facility] ENDOSCOPY;  Service: Gastroenterology;  Laterality: N/A;  PRE- JENNY UMBILICAL PAIN, DIARRHEA  POST-  POLYP, HEMORRHOIDS    NO PAST SURGERIES         Patient Active Problem List   Diagnosis    Hypertension    GERD (gastroesophageal reflux disease)    Schizophrenic disorder    Mild mental retardation    Leukocytosis    Hyperlipidemia    Essential hypertriglyceridemia    Dietary calcium deficiency    Tinea pedis    Schizoaffective disorder    Low back strain    Annual physical exam    Acute non-recurrent maxillary sinusitis    Seasonal allergic rhinitis due to pollen    Developmental mental disorder    Chronic diarrhea    Obesity (BMI 30.0-34.9)       Review of Systems   Constitutional:  Negative for activity change, appetite change, fatigue, fever, unexpected weight gain and unexpected weight loss.   HENT:  Negative for nosebleeds, rhinorrhea, trouble swallowing and voice change.    Eyes:  Negative for visual disturbance.   Respiratory:  Negative for cough, chest tightness, shortness of breath and wheezing.    Cardiovascular:  Negative for chest pain, palpitations and leg swelling.   Gastrointestinal:  Negative for abdominal pain, blood in stool, constipation, diarrhea, nausea, vomiting, GERD and indigestion.   Genitourinary:  Negative for dysuria, frequency and hematuria.   Musculoskeletal:  Positive for arthralgias. Negative for back pain and myalgias.   Skin:  Negative for rash and wound.   Neurological:  Negative for dizziness, tremors, weakness, light-headedness, numbness, headache and memory problem.   Hematological:  Negative for adenopathy. Does not bruise/bleed easily.   Psychiatric/Behavioral:  Negative for sleep disturbance and depressed mood. The patient is not  "nervous/anxious.        Objective     Vitals:    11/29/23 1422   BP: 122/78   BP Location: Left arm   Patient Position: Sitting   Cuff Size: Large Adult   Pulse: 76   SpO2: 96%   Weight: 84.3 kg (185 lb 12.8 oz)   Height: 157.5 cm (62.01\")     BMI is >= 30 and <35. (Class 1 Obesity). The following options were offered after discussion;: weight loss educational material (shared in after visit summary), exercise counseling/recommendations, and nutrition counseling/recommendations    Physical Exam  Vitals and nursing note reviewed.   Constitutional:       General: She is not in acute distress.     Appearance: She is well-developed. She is obese. She is not diaphoretic.   HENT:      Head: Normocephalic and atraumatic.      Right Ear: External ear normal.      Left Ear: External ear normal.      Nose: Nose normal.   Eyes:      Conjunctiva/sclera: Conjunctivae normal.      Pupils: Pupils are equal, round, and reactive to light.   Neck:      Thyroid: No thyromegaly.      Trachea: No tracheal deviation.   Cardiovascular:      Rate and Rhythm: Normal rate and regular rhythm.      Heart sounds: Normal heart sounds. No murmur heard.     No friction rub. No gallop.   Pulmonary:      Effort: Pulmonary effort is normal. No respiratory distress.      Breath sounds: Normal breath sounds.   Abdominal:      General: Bowel sounds are normal.      Palpations: Abdomen is soft. There is no mass.      Tenderness: There is no abdominal tenderness. There is no guarding.   Musculoskeletal:         General: Normal range of motion.      Cervical back: Normal range of motion and neck supple.   Lymphadenopathy:      Cervical: No cervical adenopathy.   Skin:     General: Skin is warm and dry.      Capillary Refill: Capillary refill takes less than 2 seconds.      Findings: No rash.   Neurological:      Mental Status: She is alert and oriented to person, place, and time.      Motor: No abnormal muscle tone.      Deep Tendon Reflexes: Reflexes " normal.   Psychiatric:         Behavior: Behavior normal.         Thought Content: Thought content normal.         Judgment: Judgment normal.       Recent Lab Results:     Lab Results   Component Value Date    TRIG 457 (H) 08/11/2021    HDL 31 (L) 08/11/2021    VLDL 63 (H) 08/11/2021    LDLHDL 0.24 07/16/2019       Assessment & Plan   Age-appropriate Screening Schedule:  Refer to the list below for future screening recommendations based on patient's age, sex and/or medical conditions.      Health Maintenance   Topic Date Due    TDAP/TD VACCINES (1 - Tdap) Never done    ZOSTER VACCINE (1 of 2) Never done    PAP SMEAR  04/17/2022    ANNUAL PHYSICAL  08/11/2022    LIPID PANEL  08/11/2022    INFLUENZA VACCINE  08/01/2023    COVID-19 Vaccine (5 - 2023-24 season) 09/01/2023    BMI FOLLOWUP  11/29/2024    MAMMOGRAM  08/08/2025    COLORECTAL CANCER SCREENING  02/02/2026    HEPATITIS C SCREENING  Completed    Pneumococcal Vaccine 0-64  Aged Out       Diagnoses and all orders for this visit:    1. Annual physical exam (Primary)    2. Primary hypertension  -     Comprehensive Metabolic Panel  -     Lipid Panel    3. Mixed hyperlipidemia  -     Comprehensive Metabolic Panel  -     Lipid Panel    4. Essential hypertriglyceridemia  -     Comprehensive Metabolic Panel  -     Lipid Panel    5. Obesity (BMI 30.0-34.9)    6. Elevated blood sugar  -     Hemoglobin A1c    Annual wellness visit reviewed with patient.  All past history, medications, social history, and problem list were reviewed.  Discussed advanced directives and living will.  Patient has living will: Living will: Directive already scanned in chart.  Will check the labs as ordered above to evaluate the blood sugars, kidney, liver, cholesterol for screening.  Discussed flu shot recommended to get the influenza vaccine annually in the fall.  Tdap, influenza, COVID booster, and Shingrix vaccination series discussed.  Encouraged follow-up with the eye doctor on annual  basis.  Discussed weight and encouraged exercise as tolerated while following a healthy diet.  Colon cancer screening discussed and current status: colonoscopy performed 2/2/2023 demonstrating only 1 small tubular adenoma.  Discussed female health and screening including Pap smear/ pelvic exam/ self breast exam/ mammogram.  Follow up with current specialists as needed.     Discussed with patient and caregiver concerning her blood pressure which is very well-controlled and her cholesterol for which we will check the labs.  Continue the current medications we will also recheck the A1c as there was a concern about her blood sugars from the [facility] psychiatric services.  Adjust medication based upon the test results.    An After Visit Summary with all of these plans were given to the patient.        Follow Up:  Return in about 1 year (around 11/29/2024) for Annual physical.

## 2023-11-30 LAB
ALBUMIN SERPL-MCNC: 3.7 G/DL (ref 3.5–5.2)
ALBUMIN/GLOB SERPL: 1.9 G/DL
ALP SERPL-CCNC: 60 U/L (ref 39–117)
ALT SERPL-CCNC: 11 U/L (ref 1–33)
AST SERPL-CCNC: 13 U/L (ref 1–32)
BILIRUB SERPL-MCNC: 0.2 MG/DL (ref 0–1.2)
BUN SERPL-MCNC: 11 MG/DL (ref 6–20)
BUN/CREAT SERPL: 25 (ref 7–25)
CALCIUM SERPL-MCNC: 8.5 MG/DL (ref 8.6–10.5)
CHLORIDE SERPL-SCNC: 103 MMOL/L (ref 98–107)
CHOLEST SERPL-MCNC: 92 MG/DL (ref 0–200)
CO2 SERPL-SCNC: 27.4 MMOL/L (ref 22–29)
CREAT SERPL-MCNC: 0.44 MG/DL (ref 0.57–1)
EGFRCR SERPLBLD CKD-EPI 2021: 115.8 ML/MIN/1.73
GLOBULIN SER CALC-MCNC: 2 GM/DL
GLUCOSE SERPL-MCNC: 117 MG/DL (ref 65–99)
HBA1C MFR BLD: 6.2 % (ref 4.8–5.6)
HDLC SERPL-MCNC: 31 MG/DL (ref 40–60)
LDLC SERPL CALC-MCNC: 24 MG/DL (ref 0–100)
POTASSIUM SERPL-SCNC: 3.9 MMOL/L (ref 3.5–5.2)
PROT SERPL-MCNC: 5.7 G/DL (ref 6–8.5)
SODIUM SERPL-SCNC: 140 MMOL/L (ref 136–145)
TRIGL SERPL-MCNC: 246 MG/DL (ref 0–150)
VLDLC SERPL CALC-MCNC: 37 MG/DL (ref 5–40)

## 2023-12-07 DIAGNOSIS — F25.9 SCHIZOAFFECTIVE DISORDER, UNSPECIFIED TYPE: ICD-10-CM

## 2023-12-07 DIAGNOSIS — F20.9 SCHIZOPHRENIC DISORDER: ICD-10-CM

## 2023-12-07 RX ORDER — LORAZEPAM 1 MG/1
TABLET ORAL
Qty: 56 TABLET | Refills: 3 | Status: SHIPPED | OUTPATIENT
Start: 2023-12-07

## 2023-12-07 RX ORDER — METOPROLOL SUCCINATE 100 MG/1
TABLET, EXTENDED RELEASE ORAL
Qty: 30 TABLET | Refills: 11 | OUTPATIENT
Start: 2023-12-07

## 2023-12-21 RX ORDER — METOPROLOL SUCCINATE 100 MG/1
TABLET, EXTENDED RELEASE ORAL
Qty: 30 TABLET | Refills: 11 | Status: SHIPPED | OUTPATIENT
Start: 2023-12-21

## 2023-12-21 RX ORDER — METOPROLOL SUCCINATE 100 MG/1
TABLET, EXTENDED RELEASE ORAL
Qty: 30 TABLET | Refills: 11 | OUTPATIENT
Start: 2023-12-21

## 2024-01-11 RX ORDER — BENZOCAINE/MENTHOL 6 MG-10 MG
LOZENGE MUCOUS MEMBRANE
Qty: 28.35 G | Refills: 1 | Status: SHIPPED | OUTPATIENT
Start: 2024-01-11

## 2024-01-11 NOTE — TELEPHONE ENCOUNTER
Rx Refill Note  Requested Prescriptions     Pending Prescriptions Disp Refills    hydrocortisone 1 % cream [Pharmacy Med Name: hydrocortisone 1 % topical cream] 28.35 g 1     Sig: APPLY TO AFFECTED AREA TWICE DAILY AS NEEDED    Acetaminophen-Caff-Pyrilamine 500-60-15 MG tablet [Pharmacy Med Name: Midol Complete 500 mg-60 mg-15 mg tablet] 24 tablet 1     Sig: TAKE ONE CAPSULE BY MOUTH tHREE TIMES DAILY AS NEEDED FOR CRAMPS      Last office visit with prescribing clinician: 11/29/2023   Last telemedicine visit with prescribing clinician: 9/12/2023   Next office visit with prescribing clinician: 2/28/2024                         Would you like a call back once the refill request has been completed: [] Yes [] No    If the office needs to give you a call back, can they leave a voicemail: [] Yes [] No    Keysha Ware MA  01/11/24, 14:52 EST

## 2024-02-27 DIAGNOSIS — F20.9 SCHIZOPHRENIC DISORDER: ICD-10-CM

## 2024-02-27 DIAGNOSIS — F25.9 SCHIZOAFFECTIVE DISORDER, UNSPECIFIED TYPE: ICD-10-CM

## 2024-02-27 RX ORDER — LORAZEPAM 1 MG/1
TABLET ORAL
Qty: 56 TABLET | Refills: 3 | OUTPATIENT
Start: 2024-02-27

## 2024-02-28 ENCOUNTER — OFFICE VISIT (OUTPATIENT)
Dept: FAMILY MEDICINE CLINIC | Facility: CLINIC | Age: 54
End: 2024-02-28
Payer: MEDICAID

## 2024-02-28 VITALS
SYSTOLIC BLOOD PRESSURE: 126 MMHG | HEART RATE: 74 BPM | DIASTOLIC BLOOD PRESSURE: 78 MMHG | HEIGHT: 62 IN | TEMPERATURE: 96.2 F | BODY MASS INDEX: 35.81 KG/M2 | WEIGHT: 194.6 LBS | OXYGEN SATURATION: 95 %

## 2024-02-28 DIAGNOSIS — R73.03 PREDIABETES: Primary | ICD-10-CM

## 2024-02-28 DIAGNOSIS — E78.2 MIXED HYPERLIPIDEMIA: ICD-10-CM

## 2024-02-28 DIAGNOSIS — I10 PRIMARY HYPERTENSION: ICD-10-CM

## 2024-02-28 PROCEDURE — 99213 OFFICE O/P EST LOW 20 MIN: CPT | Performed by: INTERNAL MEDICINE

## 2024-02-28 PROCEDURE — 1159F MED LIST DOCD IN RCRD: CPT | Performed by: INTERNAL MEDICINE

## 2024-02-28 PROCEDURE — 1160F RVW MEDS BY RX/DR IN RCRD: CPT | Performed by: INTERNAL MEDICINE

## 2024-02-28 PROCEDURE — 3078F DIAST BP <80 MM HG: CPT | Performed by: INTERNAL MEDICINE

## 2024-02-28 PROCEDURE — 3074F SYST BP LT 130 MM HG: CPT | Performed by: INTERNAL MEDICINE

## 2024-02-28 NOTE — PROGRESS NOTES
Subjective   Charity Orr is a 53 y.o. female.     Chief Complaint   Patient presents with    Hypertension    Hyperlipidemia    Follow-up       History of Present Illness   Caregiver, Emily, was present during the history-taking and subsequent discussion (and for part of the physical exam) with this patient.  Patient agrees to the presence of the individual during this visit.     53-year-old with history of hypertension, GERD, hyperlipidemia with triglyceridemia, prediabetes and developmental delay with schizoaffective disorder.     Was seen in [facility] and had labs with [facility] with concern of A1c and labs but these are not available at this time.     Patient states she has some pains that occur in her knee and her hip and sometimes up on her shoulder this is not all the time and is not limiting her activities.     Follow-up for hypertension.  Currently, has been feeling well and asymptomatic without any headaches, vision changes, cough, chest pain, shortness of breath, swelling, focal neurologic deficit, memory loss or syncope.  Has been taking the medications regularly and adherent with the regimen of amlodipine 5 mg daily and metoprolol succinate  mg daily.  Denies medication side effects and no significant interval events.      History of prediabetes with last A1c of 6.2% on 11/29/23.    The following portions of the patient's history were reviewed and updated as appropriate: allergies, current medications, past family history, past medical history, past social history, past surgical history and problem list.    Depression Screen:      11/29/2023     2:40 PM   PHQ-2/PHQ-9 Depression Screening   Little Interest or Pleasure in Doing Things 0-->not at all   Feeling Down, Depressed or Hopeless 0-->not at all   PHQ-9: Brief Depression Severity Measure Score 0       Past Medical History:   Diagnosis Date    Allergic rhinitis     Back pain     Dietary calcium deficiency     Esophageal reflux     Essential  hypertriglyceridemia     Flu vaccine need     Foot pain, left     GERD (gastroesophageal reflux disease)     History of allergy     Hyperlipidemia     Hypertension     Left foot pain     Leukocytosis     Loose stools     Lumbar muscle pain     Mild mental retardation     Mild mental retardation     Pain     Schizophrenic disorder     Soft tissue mass     Sprain of ankle     Tinea pedis     Tremor due to multiple drugs     UTI (urinary tract infection)        Past Surgical History:   Procedure Laterality Date    COLONOSCOPY N/A 2/2/2023    Procedure: COLONOSCOPY TO CECUM AND INTO TERMINAL ILEUM WITH COLD SNARE POLYPECTOMY AND COLD BIOPSIES;  Surgeon: Yovany Gr MD;  Location: [facility] ENDOSCOPY;  Service: Gastroenterology;  Laterality: N/A;  PRE- JENNY UMBILICAL PAIN, DIARRHEA  POST-  POLYP, HEMORRHOIDS    NO PAST SURGERIES         Family History   Problem Relation Age of Onset    No Known Problems Mother     Malig Hyperthermia Neg Hx        Social History     Socioeconomic History    Marital status: Single   Tobacco Use    Smoking status: Never    Smokeless tobacco: Never   Vaping Use    Vaping Use: Never used   Substance and Sexual Activity    Alcohol use: No    Drug use: No       Current Outpatient Medications   Medication Sig Dispense Refill    Acetaminophen-Caff-Pyrilamine 500-60-15 MG tablet TAKE ONE CAPSULE BY MOUTH tHREE TIMES DAILY AS NEEDED FOR CRAMPS 24 tablet 1    Acetaminophen-Pamabrom (Midol Teen) 500-25 MG tablet Take 1 tablet by mouth 3 (Three) Times a Day As Needed (cramps). 30 tablet 11    amLODIPine (NORVASC) 5 MG tablet TAKE ONE TABLET BY MOUTH DAILY 30 tablet 11    benztropine (COGENTIN) 1 MG tablet Take 1 tablet by mouth 2 (Two) Times a Day. 180 tablet 0    bisacodyl (DULCOLAX) 5 MG EC tablet Take 1 tablet by mouth Daily As Needed for Constipation.      Calcium + Vitamin D3 600-10 MG-MCG tablet per tablet TAKE ONE TABLET BY MOUTH DAILY 30 tablet 11    cetirizine (zyrTEC) 10 MG tablet  TAKE ONE TABLET BY MOUTH DAILY 90 tablet 3    chlorhexidine (PERIDEX) 0.12 % solution BRUSH TEETH WITH ONE-HALF OUNCE EVERY MORNING AND IN THE EVENING 473 mL 11    clotrimazole (LOTRIMIN) 1 % cream Apply 1 Application topically to the appropriate area as directed 2 (Two) Times a Day. APPLY TO AFFECTED AREA      CORICIDIN 2-325 MG tablet TAKE ONE TABLET BY MOUTH EVERY EIGHT HOURS AS NEEDED 20 tablet 11    divalproex (DEPAKOTE) 500 MG DR tablet Take 2 tablets by mouth 2 (Two) Times a Day.  2    docusate sodium (COLACE) 100 MG capsule TAKE ONE CAPSULE BY MOUTH DAILY 30 capsule 11    fluticasone (FLONASE) 50 MCG/ACT nasal spray INSTILL ONE SPRAY IN EACH NOSTRIL DAILY 16 g 11    folic acid (FOLVITE) 1 MG tablet Take 1 tablet by mouth Daily.      haloperidol (HALDOL) 10 MG tablet Take 1 tablet by mouth 2 (Two) Times a Day.      haloperidol (HALDOL) 5 MG tablet Take 1 tablet by mouth Daily. Takes at noon      hydrocortisone 1 % cream APPLY TO AFFECTED AREA TWICE DAILY AS NEEDED 28.35 g 1    ibuprofen (ADVIL,MOTRIN) 200 MG tablet TAKE ONE TABLET BY MOUTH EVERY 6 HOURS AS NEEDED FOR PAIN 60 tablet 11    LORazepam (ATIVAN) 1 MG tablet TAKE ONE TABLET BY MOUTH TWICE DAILY 56 tablet 3    metoprolol succinate XL (TOPROL-XL) 100 MG 24 hr tablet TAKE ONE TABLET BY MOUTH EVERY DAY 30 tablet 11    mirtazapine (REMERON) 7.5 MG tablet TAKE ONE TABLET BY MOUTH AT BEDTIME 30 tablet 5    omeprazole (priLOSEC) 40 MG capsule TAKE ONE CAPSULE DAILY 28 capsule 11    One-Daily Multi-Vitamin tablet tablet TAKE ONE TABLET BY MOUTH DAILY 30 tablet 11    Sodium Fluoride 5000 PPM 1.1 % cream USE TO BRUSH TEETH ONCE DAILY 51 g 12    Sore Throat 15-2.6 MG lozenge lozenge DISSOLVE IN MOUTH AS NEEDED FOR THROAT PAIN 32 lozenge 11    traZODone (DESYREL) 50 MG tablet Take 1 tablet by mouth every night at bedtime.  2    Tri-Estarylla 0.18/0.215/0.25 MG-35 MCG per tablet TAKE ONE TABLET BY MOUTH DAILY 28 tablet 6    vitamin C (ASCORBIC ACID) 500 MG tablet  "TAKE ONE TABLET BY MOUTH DAILY 30 tablet 11     No current facility-administered medications for this visit.       Review of Systems   Constitutional:  Negative for activity change, appetite change, fatigue, fever, unexpected weight gain and unexpected weight loss.   HENT:  Negative for nosebleeds, rhinorrhea, trouble swallowing and voice change.    Eyes:  Negative for visual disturbance.   Respiratory:  Negative for cough, chest tightness, shortness of breath and wheezing.    Cardiovascular:  Negative for chest pain, palpitations and leg swelling.   Gastrointestinal:  Negative for abdominal pain, blood in stool, constipation, diarrhea, nausea, vomiting, GERD and indigestion.   Genitourinary:  Negative for dysuria, frequency and hematuria.   Musculoskeletal:  Negative for arthralgias, back pain and myalgias.   Skin:  Negative for rash and wound.   Neurological:  Negative for dizziness, tremors, weakness, light-headedness, numbness, headache and memory problem.   Hematological:  Negative for adenopathy. Does not bruise/bleed easily.   Psychiatric/Behavioral:  Negative for sleep disturbance and depressed mood. The patient is not nervous/anxious.        Objective   /78 (BP Location: Left arm, Patient Position: Sitting, Cuff Size: Large Adult)   Pulse 74   Temp 96.2 °F (35.7 °C) (Temporal)   Ht 157.5 cm (62.01\")   Wt 88.3 kg (194 lb 9.6 oz)   SpO2 95%   BMI 35.58 kg/m²     Physical Exam  Vitals and nursing note reviewed.   Constitutional:       General: She is not in acute distress.     Appearance: She is well-developed. She is obese. She is not diaphoretic.   HENT:      Head: Normocephalic and atraumatic.      Right Ear: External ear normal.      Left Ear: External ear normal.      Nose: Nose normal.   Eyes:      Conjunctiva/sclera: Conjunctivae normal.      Pupils: Pupils are equal, round, and reactive to light.   Neck:      Thyroid: No thyromegaly.      Trachea: No tracheal deviation.   Cardiovascular:    "   Rate and Rhythm: Normal rate and regular rhythm.      Heart sounds: Normal heart sounds. No murmur heard.     No friction rub. No gallop.   Pulmonary:      Effort: Pulmonary effort is normal. No respiratory distress.      Breath sounds: Normal breath sounds.   Abdominal:      General: Bowel sounds are normal.      Palpations: Abdomen is soft. There is no mass.      Tenderness: There is no abdominal tenderness. There is no guarding.   Musculoskeletal:         General: Normal range of motion.      Cervical back: Normal range of motion and neck supple.   Lymphadenopathy:      Cervical: No cervical adenopathy.   Skin:     General: Skin is warm and dry.      Capillary Refill: Capillary refill takes less than 2 seconds.      Findings: No rash.   Neurological:      Mental Status: She is alert and oriented to person, place, and time.      Motor: No abnormal muscle tone.      Deep Tendon Reflexes: Reflexes normal.   Psychiatric:         Behavior: Behavior normal.         Thought Content: Thought content normal.         Judgment: Judgment normal.       No results found for this or any previous visit (from the past 2016 hour(s)).  Assessment & Plan   Diagnoses and all orders for this visit:    1. Prediabetes (Primary)    2. Primary hypertension    3. Mixed hyperlipidemia    Chart and records reviewed.  Will continue with the current medications.  Blood pressure is currently controlled.  Patient is a follow-up every 3 months for her lorazepam which is a controlled substance.  Next set of labs in approximately 3 to 6 months.           COVID-19 Precautions - Patient was compliant in wearing a mask. When I saw the patient, I used appropriate personal protective equipment (PPE) including mask and eye shield (standard procedure).  Additionally, I used gown and gloves if indicated.  Hand hygiene was completed before and after seeing the patient.  Dictated utilizing Dragon Dictation

## 2024-03-04 RX ORDER — NORGESTIMATE AND ETHINYL ESTRADIOL
1 KIT DAILY
Qty: 28 TABLET | Refills: 6 | Status: SHIPPED | OUTPATIENT
Start: 2024-03-04

## 2024-03-07 DIAGNOSIS — H65.91 FLUID LEVEL BEHIND TYMPANIC MEMBRANE OF RIGHT EAR: ICD-10-CM

## 2024-03-07 RX ORDER — CETIRIZINE HYDROCHLORIDE 10 MG/1
TABLET ORAL
Qty: 90 TABLET | Refills: 3 | Status: SHIPPED | OUTPATIENT
Start: 2024-03-07

## 2024-04-08 DIAGNOSIS — F20.9 SCHIZOPHRENIC DISORDER: ICD-10-CM

## 2024-04-08 DIAGNOSIS — F25.9 SCHIZOAFFECTIVE DISORDER, UNSPECIFIED TYPE: ICD-10-CM

## 2024-04-08 RX ORDER — LORAZEPAM 1 MG/1
TABLET ORAL
Qty: 56 TABLET | Refills: 0 | Status: SHIPPED | OUTPATIENT
Start: 2024-04-08

## 2024-04-09 RX ORDER — DOCUSATE SODIUM 100 MG/1
CAPSULE, LIQUID FILLED ORAL
Qty: 30 CAPSULE | Refills: 11 | Status: SHIPPED | OUTPATIENT
Start: 2024-04-09

## 2024-05-06 DIAGNOSIS — F25.9 SCHIZOAFFECTIVE DISORDER, UNSPECIFIED TYPE: ICD-10-CM

## 2024-05-06 DIAGNOSIS — F20.9 SCHIZOPHRENIC DISORDER: ICD-10-CM

## 2024-05-06 RX ORDER — LORAZEPAM 1 MG/1
TABLET ORAL
Qty: 56 TABLET | Refills: 0 | Status: SHIPPED | OUTPATIENT
Start: 2024-05-06

## 2024-05-06 RX ORDER — CHLORHEXIDINE GLUCONATE ORAL RINSE 1.2 MG/ML
SOLUTION DENTAL
Qty: 473 ML | Refills: 11 | Status: SHIPPED | OUTPATIENT
Start: 2024-05-06

## 2024-05-06 RX ORDER — MULTIVITAMIN
TABLET ORAL
Qty: 30 TABLET | Refills: 11 | Status: SHIPPED | OUTPATIENT
Start: 2024-05-06

## 2024-05-06 RX ORDER — AMLODIPINE BESYLATE 5 MG/1
TABLET ORAL
Qty: 30 TABLET | Refills: 11 | Status: SHIPPED | OUTPATIENT
Start: 2024-05-06

## 2024-05-06 RX ORDER — CALCIUM CARBONATE/VITAMIN D3 600 MG-10
TABLET ORAL
Qty: 30 TABLET | Refills: 11 | Status: SHIPPED | OUTPATIENT
Start: 2024-05-06

## 2024-05-06 RX ORDER — ASCORBIC ACID 500 MG
TABLET ORAL
Qty: 30 TABLET | Refills: 11 | Status: SHIPPED | OUTPATIENT
Start: 2024-05-06

## 2024-05-28 ENCOUNTER — OFFICE VISIT (OUTPATIENT)
Dept: FAMILY MEDICINE CLINIC | Facility: CLINIC | Age: 54
End: 2024-05-28
Payer: MEDICAID

## 2024-05-28 VITALS
OXYGEN SATURATION: 95 % | HEIGHT: 62 IN | BODY MASS INDEX: 36.4 KG/M2 | DIASTOLIC BLOOD PRESSURE: 78 MMHG | HEART RATE: 74 BPM | WEIGHT: 197.8 LBS | TEMPERATURE: 96.6 F | SYSTOLIC BLOOD PRESSURE: 120 MMHG

## 2024-05-28 DIAGNOSIS — I10 PRIMARY HYPERTENSION: Primary | ICD-10-CM

## 2024-05-28 DIAGNOSIS — E78.2 MIXED HYPERLIPIDEMIA: ICD-10-CM

## 2024-05-28 DIAGNOSIS — R73.03 PREDIABETES: ICD-10-CM

## 2024-05-28 PROCEDURE — 1160F RVW MEDS BY RX/DR IN RCRD: CPT | Performed by: INTERNAL MEDICINE

## 2024-05-28 PROCEDURE — 3078F DIAST BP <80 MM HG: CPT | Performed by: INTERNAL MEDICINE

## 2024-05-28 PROCEDURE — 1126F AMNT PAIN NOTED NONE PRSNT: CPT | Performed by: INTERNAL MEDICINE

## 2024-05-28 PROCEDURE — 3074F SYST BP LT 130 MM HG: CPT | Performed by: INTERNAL MEDICINE

## 2024-05-28 PROCEDURE — 99214 OFFICE O/P EST MOD 30 MIN: CPT | Performed by: INTERNAL MEDICINE

## 2024-05-28 PROCEDURE — 1159F MED LIST DOCD IN RCRD: CPT | Performed by: INTERNAL MEDICINE

## 2024-05-28 RX ORDER — LOPERAMIDE HYDROCHLORIDE 2 MG/1
2 TABLET ORAL 4 TIMES DAILY PRN
Qty: 20 TABLET | Refills: 0 | Status: SHIPPED | OUTPATIENT
Start: 2024-05-28

## 2024-05-28 NOTE — PROGRESS NOTES
Subjective   Charity Orr is a 53 y.o. female.     Chief Complaint   Patient presents with    Diarrhea     She is having problems with diarrhea today. A couple of weeks ago she had diarrhea and vomiting but something was going around    Follow-up       History of Present Illness   Caregiver, Emily, was present during the history-taking and subsequent discussion (and for part of the physical exam) with this patient.  Patient agrees to the presence of the individual during this visit.     53-year-old with history of hypertension, GERD, hyperlipidemia with triglyceridemia, prediabetes and developmental delay with schizoaffective disorder.     Some diarrhea/loose stools today.  Had been doing good for the last 2 weeks but 2 weeks ago had nausea vomiting and diarrhea but multiple illness at the day program as well.    Patient states she has some pains that occur in her knee and her hip and sometimes up on her shoulder this is not all the time and is not limiting her activities.     Follow-up for hypertension.  Currently, has been feeling well and asymptomatic without any headaches, vision changes, cough, chest pain, shortness of breath, swelling, focal neurologic deficit, memory loss or syncope.  Has been taking the medications regularly and adherent with the regimen of amlodipine 5 mg daily and metoprolol succinate  mg daily.  Denies medication side effects and no significant interval events.       History of prediabetes with last A1c of 6.2% on 11/29/23.       The following portions of the patient's history were reviewed and updated as appropriate: allergies, current medications, past family history, past medical history, past social history, past surgical history and problem list.    Depression Screen:      11/29/2023     2:40 PM   PHQ-2/PHQ-9 Depression Screening   Little Interest or Pleasure in Doing Things 0-->not at all   Feeling Down, Depressed or Hopeless 0-->not at all   PHQ-9: Brief Depression  Severity Measure Score 0       Past Medical History:   Diagnosis Date    Allergic rhinitis     Back pain     Dietary calcium deficiency     Esophageal reflux     Essential hypertriglyceridemia     Flu vaccine need     Foot pain, left     GERD (gastroesophageal reflux disease)     History of allergy     Hyperlipidemia     Hypertension     Left foot pain     Leukocytosis     Loose stools     Lumbar muscle pain     Mild mental retardation     Mild mental retardation     Pain     Schizophrenic disorder     Soft tissue mass     Sprain of ankle     Tinea pedis     Tremor due to multiple drugs     UTI (urinary tract infection)        Past Surgical History:   Procedure Laterality Date    COLONOSCOPY N/A 2/2/2023    Procedure: COLONOSCOPY TO CECUM AND INTO TERMINAL ILEUM WITH COLD SNARE POLYPECTOMY AND COLD BIOPSIES;  Surgeon: Yovany Gr MD;  Location: [facility] ENDOSCOPY;  Service: Gastroenterology;  Laterality: N/A;  PRE- JENNY UMBILICAL PAIN, DIARRHEA  POST-  POLYP, HEMORRHOIDS    NO PAST SURGERIES         Family History   Problem Relation Age of Onset    No Known Problems Mother     Malig Hyperthermia Neg Hx        Social History     Socioeconomic History    Marital status: Single   Tobacco Use    Smoking status: Never    Smokeless tobacco: Never   Vaping Use    Vaping status: Never Used   Substance and Sexual Activity    Alcohol use: No    Drug use: No       Current Outpatient Medications   Medication Sig Dispense Refill    Acetaminophen-Caff-Pyrilamine 500-60-15 MG tablet TAKE ONE CAPSULE BY MOUTH tHREE TIMES DAILY AS NEEDED FOR CRAMPS 24 tablet 1    Acetaminophen-Pamabrom (Midol Teen) 500-25 MG tablet Take 1 tablet by mouth 3 (Three) Times a Day As Needed (cramps). 30 tablet 11    amLODIPine (NORVASC) 5 MG tablet TAKE ONE TABLET BY MOUTH DAILY 30 tablet 11    benztropine (COGENTIN) 1 MG tablet Take 1 tablet by mouth 2 (Two) Times a Day. 180 tablet 0    bisacodyl (DULCOLAX) 5 MG EC tablet Take 1 tablet by mouth  Daily As Needed for Constipation.      Calcium + Vitamin D3 600-10 MG-MCG tablet per tablet TAKE ONE TABLET BY MOUTH DAILY 30 tablet 11    cetirizine (zyrTEC) 10 MG tablet TAKE ONE TABLET BY MOUTH DAILY 90 tablet 3    chlorhexidine (PERIDEX) 0.12 % solution BRUSH TEETH WITH ONE-HALF OUNCE EVERY MORNING AND IN THE EVENING 473 mL 11    clotrimazole (LOTRIMIN) 1 % cream Apply 1 Application topically to the appropriate area as directed 2 (Two) Times a Day. APPLY TO AFFECTED AREA      CORICIDIN 2-325 MG tablet TAKE ONE TABLET BY MOUTH EVERY EIGHT HOURS AS NEEDED 20 tablet 11    divalproex (DEPAKOTE) 500 MG DR tablet Take 2 tablets by mouth 2 (Two) Times a Day.  2    docusate sodium (COLACE) 100 MG capsule TAKE ONE CAPSULE BY MOUTH DAILY 30 capsule 11    fluticasone (FLONASE) 50 MCG/ACT nasal spray INSTILL ONE SPRAY IN EACH NOSTRIL DAILY 16 g 11    folic acid (FOLVITE) 1 MG tablet Take 1 tablet by mouth Daily.      haloperidol (HALDOL) 10 MG tablet Take 1 tablet by mouth 2 (Two) Times a Day.      haloperidol (HALDOL) 5 MG tablet Take 1 tablet by mouth Daily. Takes at noon      hydrocortisone 1 % cream APPLY TO AFFECTED AREA TWICE DAILY AS NEEDED 28.35 g 1    ibuprofen (ADVIL,MOTRIN) 200 MG tablet TAKE ONE TABLET BY MOUTH EVERY 6 HOURS AS NEEDED FOR PAIN 60 tablet 11    LORazepam (ATIVAN) 1 MG tablet TAKE ONE TABLET BY MOUTH TWICE DAILY 56 tablet 0    metoprolol succinate XL (TOPROL-XL) 100 MG 24 hr tablet TAKE ONE TABLET BY MOUTH EVERY DAY 30 tablet 11    mirtazapine (REMERON) 7.5 MG tablet TAKE ONE TABLET BY MOUTH AT BEDTIME 30 tablet 5    omeprazole (priLOSEC) 40 MG capsule TAKE ONE CAPSULE DAILY 28 capsule 11    One-Daily Multi-Vitamin tablet tablet TAKE ONE TABLET BY MOUTH DAILY 30 tablet 11    Sodium Fluoride 5000 PPM 1.1 % cream USE TO BRUSH TEETH ONCE DAILY 51 g 12    Sore Throat 15-2.6 MG lozenge lozenge DISSOLVE IN MOUTH AS NEEDED FOR THROAT PAIN 32 lozenge 11    traZODone (DESYREL) 50 MG tablet Take 1 tablet by  "mouth every night at bedtime.  2    Tri-Monica 0.18/0.215/0.25 MG-35 MCG per tablet TAKE ONE TABLET BY MOUTH DAILY 28 tablet 6    vitamin C (ASCORBIC ACID) 500 MG tablet TAKE ONE TABLET BY MOUTH DAILY 30 tablet 11    loperamide (Imodium A-D) 2 MG tablet Take 1 tablet by mouth 4 (Four) Times a Day As Needed for Diarrhea. 20 tablet 0     No current facility-administered medications for this visit.       Review of Systems   Constitutional:  Negative for activity change, appetite change, fatigue, fever, unexpected weight gain and unexpected weight loss.   HENT:  Negative for nosebleeds, rhinorrhea, trouble swallowing and voice change.    Eyes:  Negative for visual disturbance.   Respiratory:  Negative for cough, chest tightness, shortness of breath and wheezing.    Cardiovascular:  Negative for chest pain, palpitations and leg swelling.   Gastrointestinal:  Positive for diarrhea. Negative for abdominal pain, blood in stool, constipation, nausea, vomiting, GERD and indigestion.   Genitourinary:  Negative for dysuria, frequency and hematuria.   Musculoskeletal:  Negative for arthralgias, back pain and myalgias.   Skin:  Negative for rash and wound.   Neurological:  Negative for dizziness, tremors, weakness, light-headedness, numbness, headache and memory problem.   Hematological:  Negative for adenopathy. Does not bruise/bleed easily.   Psychiatric/Behavioral:  Negative for sleep disturbance and depressed mood. The patient is not nervous/anxious.        Objective   /78 (BP Location: Left arm, Patient Position: Sitting, Cuff Size: Large Adult)   Pulse 74   Temp 96.6 °F (35.9 °C) (Temporal)   Ht 157.5 cm (62.01\")   Wt 89.7 kg (197 lb 12.8 oz)   SpO2 95%   BMI 36.17 kg/m²     Physical Exam  Vitals and nursing note reviewed.   Constitutional:       General: She is not in acute distress.     Appearance: She is well-developed. She is obese. She is not diaphoretic.   HENT:      Head: Normocephalic and atraumatic.      " Right Ear: External ear normal.      Left Ear: External ear normal.      Nose: Nose normal.   Eyes:      Conjunctiva/sclera: Conjunctivae normal.      Pupils: Pupils are equal, round, and reactive to light.   Neck:      Thyroid: No thyromegaly.      Trachea: No tracheal deviation.   Cardiovascular:      Rate and Rhythm: Normal rate and regular rhythm.      Heart sounds: Normal heart sounds. No murmur heard.     No friction rub. No gallop.   Pulmonary:      Effort: Pulmonary effort is normal. No respiratory distress.      Breath sounds: Normal breath sounds.   Abdominal:      General: Bowel sounds are normal.      Palpations: Abdomen is soft. There is no mass.      Tenderness: There is no abdominal tenderness. There is no guarding.   Musculoskeletal:         General: Normal range of motion.      Cervical back: Normal range of motion and neck supple.   Lymphadenopathy:      Cervical: No cervical adenopathy.   Skin:     General: Skin is warm and dry.      Capillary Refill: Capillary refill takes less than 2 seconds.      Findings: No rash.   Neurological:      Mental Status: She is alert and oriented to person, place, and time.      Motor: No abnormal muscle tone.      Deep Tendon Reflexes: Reflexes normal.   Psychiatric:         Behavior: Behavior normal.         Thought Content: Thought content normal.         Judgment: Judgment normal.         No results found for this or any previous visit (from the past 2016 hour(s)).  Assessment & Plan   Diagnoses and all orders for this visit:    1. Primary hypertension (Primary)  -     Comprehensive Metabolic Panel    2. Mixed hyperlipidemia  -     Comprehensive Metabolic Panel    3. Prediabetes  -     Hemoglobin A1c    Other orders  -     loperamide (Imodium A-D) 2 MG tablet; Take 1 tablet by mouth 4 (Four) Times a Day As Needed for Diarrhea.  Dispense: 20 tablet; Refill: 0      Hypertension is well-controlled.  Continue current medications.  Check labs as noted above.   Monitoring her blood sugar through A1c at this time.  She does have some diarrhea which is likely a viral gastroenteritis symptomatic treatment and observation.  She has worsening problems as a follow-up.         COVID-19 Precautions - Patient was compliant in wearing a mask. When I saw the patient, I used appropriate personal protective equipment (PPE) including mask and eye shield (standard procedure).  Additionally, I used gown and gloves if indicated.  Hand hygiene was completed before and after seeing the patient.  Dictated utilizing Dragon Dictation

## 2024-05-29 LAB
ALBUMIN SERPL-MCNC: 3.7 G/DL (ref 3.5–5.2)
ALBUMIN/GLOB SERPL: 1.7 G/DL
ALP SERPL-CCNC: 59 U/L (ref 39–117)
ALT SERPL-CCNC: 11 U/L (ref 1–33)
AST SERPL-CCNC: 16 U/L (ref 1–32)
BILIRUB SERPL-MCNC: 0.2 MG/DL (ref 0–1.2)
BUN SERPL-MCNC: 12 MG/DL (ref 6–20)
BUN/CREAT SERPL: 20.3 (ref 7–25)
CALCIUM SERPL-MCNC: 8.9 MG/DL (ref 8.6–10.5)
CHLORIDE SERPL-SCNC: 103 MMOL/L (ref 98–107)
CO2 SERPL-SCNC: 26.3 MMOL/L (ref 22–29)
CREAT SERPL-MCNC: 0.59 MG/DL (ref 0.57–1)
EGFRCR SERPLBLD CKD-EPI 2021: 107.9 ML/MIN/1.73
GLOBULIN SER CALC-MCNC: 2.2 GM/DL
GLUCOSE SERPL-MCNC: 113 MG/DL (ref 65–99)
HBA1C MFR BLD: 6.9 % (ref 4.8–5.6)
POTASSIUM SERPL-SCNC: 4.4 MMOL/L (ref 3.5–5.2)
PROT SERPL-MCNC: 5.9 G/DL (ref 6–8.5)
SODIUM SERPL-SCNC: 142 MMOL/L (ref 136–145)

## 2024-06-03 DIAGNOSIS — F25.9 SCHIZOAFFECTIVE DISORDER, UNSPECIFIED TYPE: ICD-10-CM

## 2024-06-03 DIAGNOSIS — F20.9 SCHIZOPHRENIC DISORDER: ICD-10-CM

## 2024-06-03 RX ORDER — LORAZEPAM 1 MG/1
TABLET ORAL
Qty: 56 TABLET | Refills: 1 | Status: SHIPPED | OUTPATIENT
Start: 2024-06-03

## 2024-06-18 RX ORDER — BENZOCAINE AND DEXTROMETHORPHAN HYDROBROMIDE 7.5; 5 MG/1; MG/1
LOZENGE ORAL
Refills: 11 | OUTPATIENT
Start: 2024-06-18

## 2024-07-30 DIAGNOSIS — F20.9 SCHIZOPHRENIC DISORDER: ICD-10-CM

## 2024-07-30 DIAGNOSIS — F25.9 SCHIZOAFFECTIVE DISORDER, UNSPECIFIED TYPE: ICD-10-CM

## 2024-07-30 RX ORDER — LORAZEPAM 1 MG/1
TABLET ORAL
Qty: 56 TABLET | Refills: 1 | Status: SHIPPED | OUTPATIENT
Start: 2024-07-30

## 2024-07-30 RX ORDER — OMEPRAZOLE 40 MG/1
CAPSULE, DELAYED RELEASE ORAL
Qty: 28 CAPSULE | Refills: 4 | Status: SHIPPED | OUTPATIENT
Start: 2024-07-30

## 2024-08-15 RX ORDER — FLUTICASONE PROPIONATE 50 MCG
SPRAY, SUSPENSION (ML) NASAL
Qty: 16 G | Refills: 8 | Status: SHIPPED | OUTPATIENT
Start: 2024-08-15

## 2024-08-19 RX ORDER — SODIUM FLUORIDE 5 MG/ML
PASTE, DENTIFRICE DENTAL
Qty: 51 G | Refills: 12 | Status: SHIPPED | OUTPATIENT
Start: 2024-08-19

## 2024-08-20 RX ORDER — SODIUM FLUORIDE 5 MG/G
CREAM DENTAL
Qty: 51 G | Refills: 12 | OUTPATIENT
Start: 2024-08-20

## 2024-08-30 RX ORDER — BENZOCAINE AND DEXTROMETHORPHAN HYDROBROMIDE 7.5; 5 MG/1; MG/1
LOZENGE ORAL
Refills: 11 | OUTPATIENT
Start: 2024-08-30

## 2024-09-05 NOTE — TELEPHONE ENCOUNTER
LOV                   5/28/2024  NOV                   12/3/2024  Last RF              3/4/24       PROTOCOL       Not met    Richa GAVIN NRCMA/LMR

## 2024-09-06 RX ORDER — NORGESTIMATE AND ETHINYL ESTRADIOL
1 KIT DAILY
Qty: 28 TABLET | Refills: 6 | Status: SHIPPED | OUTPATIENT
Start: 2024-09-06

## 2024-09-10 RX ORDER — BENZOCAINE AND DEXTROMETHORPHAN HYDROBROMIDE 7.5; 5 MG/1; MG/1
LOZENGE ORAL
Refills: 11 | OUTPATIENT
Start: 2024-09-10

## 2024-09-17 ENCOUNTER — TELEPHONE (OUTPATIENT)
Dept: FAMILY MEDICINE CLINIC | Facility: CLINIC | Age: 54
End: 2024-09-17
Payer: MEDICAID

## 2024-09-17 DIAGNOSIS — Z12.31 SCREENING MAMMOGRAM FOR BREAST CANCER: Primary | ICD-10-CM

## 2024-09-28 DIAGNOSIS — F25.9 SCHIZOAFFECTIVE DISORDER, UNSPECIFIED TYPE: ICD-10-CM

## 2024-09-28 DIAGNOSIS — F20.9 SCHIZOPHRENIC DISORDER: ICD-10-CM

## 2024-09-30 ENCOUNTER — OFFICE VISIT (OUTPATIENT)
Dept: FAMILY MEDICINE CLINIC | Facility: CLINIC | Age: 54
End: 2024-09-30
Payer: MEDICAID

## 2024-09-30 VITALS
WEIGHT: 206 LBS | TEMPERATURE: 97.3 F | BODY MASS INDEX: 37.91 KG/M2 | DIASTOLIC BLOOD PRESSURE: 70 MMHG | HEART RATE: 86 BPM | HEIGHT: 62 IN | SYSTOLIC BLOOD PRESSURE: 106 MMHG | OXYGEN SATURATION: 94 %

## 2024-09-30 DIAGNOSIS — I10 PRIMARY HYPERTENSION: Primary | ICD-10-CM

## 2024-09-30 DIAGNOSIS — E11.9 TYPE 2 DIABETES MELLITUS WITHOUT COMPLICATION, WITHOUT LONG-TERM CURRENT USE OF INSULIN: ICD-10-CM

## 2024-09-30 DIAGNOSIS — K52.9 CHRONIC DIARRHEA: ICD-10-CM

## 2024-09-30 DIAGNOSIS — E78.2 MIXED HYPERLIPIDEMIA: ICD-10-CM

## 2024-09-30 PROBLEM — R73.03 PREDIABETES: Status: RESOLVED | Noted: 2024-02-28 | Resolved: 2024-09-30

## 2024-09-30 LAB
BILIRUB BLD-MCNC: NEGATIVE MG/DL
CLARITY, POC: CLEAR
COLOR UR: YELLOW
EXPIRATION DATE: ABNORMAL
GLUCOSE UR STRIP-MCNC: NEGATIVE MG/DL
KETONES UR QL: ABNORMAL
LEUKOCYTE EST, POC: NEGATIVE
Lab: ABNORMAL
NITRITE UR-MCNC: NEGATIVE MG/ML
PH UR: 6 [PH] (ref 5–8)
PROT UR STRIP-MCNC: ABNORMAL MG/DL
RBC # UR STRIP: NEGATIVE /UL
SP GR UR: 1.05 (ref 1–1.03)
UROBILINOGEN UR QL: NORMAL

## 2024-09-30 PROCEDURE — 3074F SYST BP LT 130 MM HG: CPT | Performed by: INTERNAL MEDICINE

## 2024-09-30 PROCEDURE — 3078F DIAST BP <80 MM HG: CPT | Performed by: INTERNAL MEDICINE

## 2024-09-30 PROCEDURE — 1126F AMNT PAIN NOTED NONE PRSNT: CPT | Performed by: INTERNAL MEDICINE

## 2024-09-30 PROCEDURE — 1159F MED LIST DOCD IN RCRD: CPT | Performed by: INTERNAL MEDICINE

## 2024-09-30 PROCEDURE — 99214 OFFICE O/P EST MOD 30 MIN: CPT | Performed by: INTERNAL MEDICINE

## 2024-09-30 PROCEDURE — 3044F HG A1C LEVEL LT 7.0%: CPT | Performed by: INTERNAL MEDICINE

## 2024-09-30 PROCEDURE — 1160F RVW MEDS BY RX/DR IN RCRD: CPT | Performed by: INTERNAL MEDICINE

## 2024-09-30 RX ORDER — LORAZEPAM 1 MG/1
TABLET ORAL
Qty: 56 TABLET | Refills: 1 | Status: SHIPPED | OUTPATIENT
Start: 2024-09-30

## 2024-09-30 NOTE — PROGRESS NOTES
Subjective   Charity Orr is a 53 y.o. female.     Chief Complaint   Patient presents with    Diarrhea       Diarrhea   Pertinent negatives include no abdominal pain, arthralgias, coughing, fever, myalgias, vomiting or weight loss.      Caregiver, Emily, was present during the history-taking and subsequent discussion (and for part of the physical exam) with this patient.  Patient agrees to the presence of the individual during this visit.     53-year-old with history of hypertension, GERD, hyperlipidemia with triglyceridemia, prediabetes and developmental delay with schizoaffective disorder.     Some diarrhea/loose stools for the last 3-4 weeks that occurs 30-45 minutes after eats and does not matter what she eats.  History of colonoscopy 2/2/24 with benign and tubular adenoma only findings.  No F, C, N, V, Rashes or noted blood in stool.     Follow-up for hypertension.  Currently, has been feeling well and asymptomatic without any headaches, vision changes, cough, chest pain, shortness of breath, swelling, focal neurologic deficit, memory loss or syncope.  Has been taking the medications regularly and adherent with the regimen of amlodipine 5 mg daily and metoprolol succinate  mg daily.  Denies medication side effects and no significant interval events.       History of diabetes with last A1c of 6.9% on 5/28/2024 currently on no medications.    The following portions of the patient's history were reviewed and updated as appropriate: allergies, current medications, past family history, past medical history, past social history, past surgical history and problem list.    Depression Screen:      9/30/2024     1:29 PM   PHQ-2/PHQ-9 Depression Screening   Little Interest or Pleasure in Doing Things 0-->not at all   Feeling Down, Depressed or Hopeless 0-->not at all   PHQ-9: Brief Depression Severity Measure Score 0       Past Medical History:   Diagnosis Date    Allergic rhinitis     Back pain     Dietary  calcium deficiency     Esophageal reflux     Essential hypertriglyceridemia     Flu vaccine need     Foot pain, left     GERD (gastroesophageal reflux disease)     History of allergy     Hyperlipidemia     Hypertension     Left foot pain     Leukocytosis     Loose stools     Lumbar muscle pain     Mild mental retardation     Mild mental retardation     Pain     Schizophrenic disorder     Soft tissue mass     Sprain of ankle     Tinea pedis     Tremor due to multiple drugs     UTI (urinary tract infection)        Past Surgical History:   Procedure Laterality Date    COLONOSCOPY N/A 2/2/2023    Procedure: COLONOSCOPY TO CECUM AND INTO TERMINAL ILEUM WITH COLD SNARE POLYPECTOMY AND COLD BIOPSIES;  Surgeon: Yovany Gr MD;  Location: [facility] ENDOSCOPY;  Service: Gastroenterology;  Laterality: N/A;  PRE- JENNY UMBILICAL PAIN, DIARRHEA  POST-  POLYP, HEMORRHOIDS    NO PAST SURGERIES         Family History   Problem Relation Age of Onset    No Known Problems Mother     Malig Hyperthermia Neg Hx        Social History     Socioeconomic History    Marital status: Single   Tobacco Use    Smoking status: Never    Smokeless tobacco: Never   Vaping Use    Vaping status: Never Used   Substance and Sexual Activity    Alcohol use: No    Drug use: No       Current Outpatient Medications   Medication Sig Dispense Refill    Acetaminophen-Pamabrom (Midol Teen) 500-25 MG tablet Take 1 tablet by mouth 3 (Three) Times a Day As Needed (cramps). 30 tablet 11    amLODIPine (NORVASC) 5 MG tablet TAKE ONE TABLET BY MOUTH DAILY 30 tablet 11    benztropine (COGENTIN) 1 MG tablet Take 1 tablet by mouth 2 (Two) Times a Day. 180 tablet 0    Calcium + Vitamin D3 600-10 MG-MCG tablet per tablet TAKE ONE TABLET BY MOUTH DAILY 30 tablet 11    cetirizine (zyrTEC) 10 MG tablet TAKE ONE TABLET BY MOUTH DAILY 90 tablet 3    chlorhexidine (PERIDEX) 0.12 % solution BRUSH TEETH WITH ONE-HALF OUNCE EVERY MORNING AND IN THE EVENING 473 mL 11     clotrimazole (LOTRIMIN) 1 % cream Apply 1 Application topically to the appropriate area as directed 2 (Two) Times a Day. APPLY TO AFFECTED AREA      CORICIDIN 2-325 MG tablet TAKE ONE TABLET BY MOUTH EVERY EIGHT HOURS AS NEEDED 20 tablet 11    divalproex (DEPAKOTE) 500 MG DR tablet Take 2 tablets by mouth 2 (Two) Times a Day.  2    fluticasone (FLONASE) 50 MCG/ACT nasal spray INSTILL ONE SPRAY IN EACH NOSTRIL DAILY 16 g 8    haloperidol (HALDOL) 10 MG tablet Take 1 tablet by mouth 2 (Two) Times a Day.      haloperidol (HALDOL) 5 MG tablet Take 1 tablet by mouth Daily. Takes at noon      hydrocortisone 1 % cream APPLY TO AFFECTED AREA TWICE DAILY AS NEEDED 28.35 g 1    ibuprofen (ADVIL,MOTRIN) 200 MG tablet TAKE ONE TABLET BY MOUTH EVERY 6 HOURS AS NEEDED FOR PAIN 60 tablet 11    loperamide (Imodium A-D) 2 MG tablet Take 1 tablet by mouth 4 (Four) Times a Day As Needed for Diarrhea. 20 tablet 0    LORazepam (ATIVAN) 1 MG tablet TAKE ONE TABLET BY MOUTH TWICE DAILY 56 tablet 1    metoprolol succinate XL (TOPROL-XL) 100 MG 24 hr tablet TAKE ONE TABLET BY MOUTH EVERY DAY 30 tablet 11    mirtazapine (REMERON) 7.5 MG tablet TAKE ONE TABLET BY MOUTH AT BEDTIME 30 tablet 5    omeprazole (priLOSEC) 40 MG capsule TAKE ONE CAPSULE DAILY 28 capsule 4    One-Daily Multi-Vitamin tablet tablet TAKE ONE TABLET BY MOUTH DAILY 30 tablet 11    Sore Throat 15-2.6 MG lozenge lozenge DISSOLVE IN MOUTH AS NEEDED FOR THROAT PAIN 32 lozenge 11    traZODone (DESYREL) 50 MG tablet Take 1 tablet by mouth every night at bedtime.  2    Tri-Monica 0.18/0.215/0.25 MG-35 MCG per tablet TAKE ONE TABLET BY MOUTH DAILY 28 tablet 6    vitamin C (ASCORBIC ACID) 500 MG tablet TAKE ONE TABLET BY MOUTH DAILY 30 tablet 11     No current facility-administered medications for this visit.       Review of Systems   Constitutional:  Negative for activity change, appetite change, fatigue, fever, unexpected weight gain and unexpected weight loss.   HENT:  Negative  "for nosebleeds, rhinorrhea, trouble swallowing and voice change.    Eyes:  Negative for visual disturbance.   Respiratory:  Negative for cough, chest tightness, shortness of breath and wheezing.    Cardiovascular:  Negative for chest pain, palpitations and leg swelling.   Gastrointestinal:  Positive for diarrhea. Negative for abdominal pain, blood in stool, constipation, nausea, vomiting, GERD and indigestion.   Genitourinary:  Negative for dysuria, frequency and hematuria.   Musculoskeletal:  Negative for arthralgias, back pain and myalgias.   Skin:  Negative for rash and wound.   Neurological:  Negative for dizziness, tremors, weakness, light-headedness, numbness, headache and memory problem.   Hematological:  Negative for adenopathy. Does not bruise/bleed easily.   Psychiatric/Behavioral:  Negative for sleep disturbance and depressed mood. The patient is not nervous/anxious.        Objective   /70 (BP Location: Left arm, Patient Position: Sitting, Cuff Size: Large Adult)   Pulse 86   Temp 97.3 °F (36.3 °C) (Temporal)   Ht 157.5 cm (62\")   Wt 93.4 kg (206 lb)   SpO2 94%   BMI 37.68 kg/m²     Physical Exam  Vitals and nursing note reviewed.   Constitutional:       General: She is not in acute distress.     Appearance: She is well-developed. She is obese. She is not diaphoretic.   HENT:      Head: Normocephalic and atraumatic.      Right Ear: External ear normal.      Left Ear: External ear normal.      Nose: Nose normal.   Eyes:      Conjunctiva/sclera: Conjunctivae normal.      Pupils: Pupils are equal, round, and reactive to light.   Neck:      Thyroid: No thyromegaly.      Trachea: No tracheal deviation.   Cardiovascular:      Rate and Rhythm: Normal rate and regular rhythm.      Heart sounds: Normal heart sounds. No murmur heard.     No friction rub. No gallop.   Pulmonary:      Effort: Pulmonary effort is normal. No respiratory distress.      Breath sounds: Normal breath sounds.   Abdominal:      " General: Bowel sounds are normal.      Palpations: Abdomen is soft. There is no mass.      Tenderness: There is no abdominal tenderness. There is no guarding.   Musculoskeletal:         General: Normal range of motion.      Cervical back: Normal range of motion and neck supple.   Lymphadenopathy:      Cervical: No cervical adenopathy.   Skin:     General: Skin is warm and dry.      Capillary Refill: Capillary refill takes less than 2 seconds.      Findings: No rash.   Neurological:      Mental Status: She is alert and oriented to person, place, and time.      Motor: No abnormal muscle tone.      Deep Tendon Reflexes: Reflexes normal.   Psychiatric:         Behavior: Behavior normal.         Thought Content: Thought content normal.         Judgment: Judgment normal.         Recent Results (from the past 2016 hour(s))   POC Urinalysis Dipstick, Automated    Collection Time: 09/30/24  2:15 PM    Specimen: Urine   Result Value Ref Range    Color Yellow Yellow, Straw, Dark Yellow, Monse    Clarity, UA Clear Clear    Specific Gravity  1.050 (A) 1.005 - 1.030    pH, Urine 6.0 5.0 - 8.0    Leukocytes Negative Negative    Nitrite, UA Negative Negative    Protein, POC Trace (A) Negative mg/dL    Glucose, UA Negative Negative mg/dL    Ketones, UA Trace (A) Negative    Urobilinogen, UA Normal Normal, 0.2 E.U./dL    Bilirubin Negative Negative    Blood, UA Negative Negative    Lot Number [id number]     Expiration Date 01/14/2025      Assessment & Plan   Diagnoses and all orders for this visit:    1. Primary hypertension (Primary)  -     Comprehensive Metabolic Panel  -     CBC & Differential    2. Chronic diarrhea  -     POC Urinalysis Dipstick, Automated  -     Comprehensive Metabolic Panel  -     TSH Rfx On Abnormal To Free T4  -     CBC & Differential    3. Mixed hyperlipidemia  -     Comprehensive Metabolic Panel  -     Lipid Panel    4. Type 2 diabetes mellitus without complication, without long-term current use of  insulin  -     Comprehensive Metabolic Panel  -     Hemoglobin A1c  -     Lipid Panel    Patient diarrhea possible gastroenteritis vs GI or liver source vs thyroid and recommend symptomatic treatment and once again recommend use of Imodium and encouraging fluids.  Will check labs as noted above.  Hypertension is well-controlled.  Continue current medications otherwise.  Noted history of diabetes relatively recent development.  Will have labs performed to monitor.  No evidence for UTI by urinalysis.  Monitor the blood sugar.  Noted the blood pressure is actually on the lower side but asymptomatic.       Dictated utilizing Dragon Dictation

## 2024-09-30 NOTE — TELEPHONE ENCOUNTER
LOV                   5/28/2024  NOV                   9/30/2024  Last refill             7/30/24  Protocol              not met

## 2024-10-01 LAB
ALBUMIN SERPL-MCNC: 3.7 G/DL (ref 3.8–4.9)
ALP SERPL-CCNC: 66 IU/L (ref 44–121)
ALT SERPL-CCNC: 20 IU/L (ref 0–32)
AST SERPL-CCNC: 22 IU/L (ref 0–40)
BASOPHILS # BLD AUTO: 0.1 X10E3/UL (ref 0–0.2)
BASOPHILS NFR BLD AUTO: 1 %
BILIRUB SERPL-MCNC: <0.2 MG/DL (ref 0–1.2)
BUN SERPL-MCNC: 16 MG/DL (ref 6–24)
BUN/CREAT SERPL: 33 (ref 9–23)
CALCIUM SERPL-MCNC: 9 MG/DL (ref 8.7–10.2)
CHLORIDE SERPL-SCNC: 102 MMOL/L (ref 96–106)
CHOLEST SERPL-MCNC: 121 MG/DL (ref 100–199)
CO2 SERPL-SCNC: 20 MMOL/L (ref 20–29)
CREAT SERPL-MCNC: 0.49 MG/DL (ref 0.57–1)
EGFRCR SERPLBLD CKD-EPI 2021: 113 ML/MIN/1.73
EOSINOPHIL # BLD AUTO: 0.1 X10E3/UL (ref 0–0.4)
EOSINOPHIL NFR BLD AUTO: 1 %
ERYTHROCYTE [DISTWIDTH] IN BLOOD BY AUTOMATED COUNT: 13.4 % (ref 11.7–15.4)
GLOBULIN SER CALC-MCNC: 2.4 G/DL (ref 1.5–4.5)
GLUCOSE SERPL-MCNC: 186 MG/DL (ref 70–99)
HBA1C MFR BLD: 7.5 % (ref 4.8–5.6)
HCT VFR BLD AUTO: 41.9 % (ref 34–46.6)
HDLC SERPL-MCNC: 32 MG/DL
HGB BLD-MCNC: 14.1 G/DL (ref 11.1–15.9)
IMM GRANULOCYTES # BLD AUTO: 0.1 X10E3/UL (ref 0–0.1)
IMM GRANULOCYTES NFR BLD AUTO: 1 %
LDLC SERPL CALC-MCNC: 31 MG/DL (ref 0–99)
LYMPHOCYTES # BLD AUTO: 4.8 X10E3/UL (ref 0.7–3.1)
LYMPHOCYTES NFR BLD AUTO: 33 %
MCH RBC QN AUTO: 31.8 PG (ref 26.6–33)
MCHC RBC AUTO-ENTMCNC: 33.7 G/DL (ref 31.5–35.7)
MCV RBC AUTO: 95 FL (ref 79–97)
MONOCYTES # BLD AUTO: 0.9 X10E3/UL (ref 0.1–0.9)
MONOCYTES NFR BLD AUTO: 6 %
NEUTROPHILS # BLD AUTO: 8.7 X10E3/UL (ref 1.4–7)
NEUTROPHILS NFR BLD AUTO: 58 %
PLATELET # BLD AUTO: 243 X10E3/UL (ref 150–450)
POTASSIUM SERPL-SCNC: 4.1 MMOL/L (ref 3.5–5.2)
PROT SERPL-MCNC: 6.1 G/DL (ref 6–8.5)
RBC # BLD AUTO: 4.43 X10E6/UL (ref 3.77–5.28)
SODIUM SERPL-SCNC: 140 MMOL/L (ref 134–144)
TRIGL SERPL-MCNC: 412 MG/DL (ref 0–149)
TSH SERPL DL<=0.005 MIU/L-ACNC: 1.92 UIU/ML (ref 0.45–4.5)
VLDLC SERPL CALC-MCNC: 58 MG/DL (ref 5–40)
WBC # BLD AUTO: 14.7 X10E3/UL (ref 3.4–10.8)

## 2024-11-05 RX ORDER — BENZOCAINE 2.6; 15 MG/1; MG/1
1 LOZENGE ORAL EVERY 4 HOURS PRN
Qty: 32 LOZENGE | Refills: 11 | Status: SHIPPED | OUTPATIENT
Start: 2024-11-05

## 2024-11-22 DIAGNOSIS — F20.9 SCHIZOPHRENIC DISORDER: ICD-10-CM

## 2024-11-22 DIAGNOSIS — F25.9 SCHIZOAFFECTIVE DISORDER, UNSPECIFIED TYPE: ICD-10-CM

## 2024-11-22 RX ORDER — METOPROLOL SUCCINATE 100 MG/1
100 TABLET, EXTENDED RELEASE ORAL DAILY
Qty: 30 TABLET | Refills: 11 | Status: SHIPPED | OUTPATIENT
Start: 2024-11-22

## 2024-11-22 NOTE — TELEPHONE ENCOUNTER
LOV                 9/30/24  NOV                   12/3/24  Last refill             12/21/23  Protocol              met

## 2024-11-25 RX ORDER — LORAZEPAM 1 MG/1
TABLET ORAL
Qty: 60 TABLET | Refills: 1 | Status: SHIPPED | OUTPATIENT
Start: 2024-11-25

## 2024-11-25 NOTE — TELEPHONE ENCOUNTER
LOV 9/30/24  NOV 12/3/24  LF 9/30/24    Protocol  Please advise    [facility]

## 2024-12-03 ENCOUNTER — OFFICE VISIT (OUTPATIENT)
Dept: FAMILY MEDICINE CLINIC | Facility: CLINIC | Age: 54
End: 2024-12-03
Payer: MEDICAID

## 2024-12-03 VITALS
TEMPERATURE: 96.9 F | BODY MASS INDEX: 38.02 KG/M2 | HEIGHT: 62 IN | HEART RATE: 73 BPM | WEIGHT: 206.6 LBS | DIASTOLIC BLOOD PRESSURE: 88 MMHG | OXYGEN SATURATION: 94 % | SYSTOLIC BLOOD PRESSURE: 128 MMHG

## 2024-12-03 DIAGNOSIS — Z23 IMMUNIZATION DUE: ICD-10-CM

## 2024-12-03 DIAGNOSIS — I10 PRIMARY HYPERTENSION: ICD-10-CM

## 2024-12-03 DIAGNOSIS — E11.9 TYPE 2 DIABETES MELLITUS WITHOUT COMPLICATION, WITHOUT LONG-TERM CURRENT USE OF INSULIN: ICD-10-CM

## 2024-12-03 DIAGNOSIS — Z00.00 ANNUAL PHYSICAL EXAM: Primary | ICD-10-CM

## 2024-12-03 PROCEDURE — 1126F AMNT PAIN NOTED NONE PRSNT: CPT | Performed by: INTERNAL MEDICINE

## 2024-12-03 PROCEDURE — 99396 PREV VISIT EST AGE 40-64: CPT | Performed by: INTERNAL MEDICINE

## 2024-12-03 PROCEDURE — 90656 IIV3 VACC NO PRSV 0.5 ML IM: CPT | Performed by: INTERNAL MEDICINE

## 2024-12-03 PROCEDURE — 3079F DIAST BP 80-89 MM HG: CPT | Performed by: INTERNAL MEDICINE

## 2024-12-03 PROCEDURE — 90480 ADMN SARSCOV2 VAC 1/ONLY CMP: CPT | Performed by: INTERNAL MEDICINE

## 2024-12-03 PROCEDURE — 91320 SARSCV2 VAC 30MCG TRS-SUC IM: CPT | Performed by: INTERNAL MEDICINE

## 2024-12-03 PROCEDURE — 3074F SYST BP LT 130 MM HG: CPT | Performed by: INTERNAL MEDICINE

## 2024-12-03 PROCEDURE — 3051F HG A1C>EQUAL 7.0%<8.0%: CPT | Performed by: INTERNAL MEDICINE

## 2024-12-03 PROCEDURE — 90471 IMMUNIZATION ADMIN: CPT | Performed by: INTERNAL MEDICINE

## 2024-12-03 RX ORDER — SODIUM FLUORIDE 5 MG/G
CREAM DENTAL
COMMUNITY
Start: 2024-10-24

## 2024-12-03 RX ORDER — DOCUSATE SODIUM 100 MG/1
CAPSULE, LIQUID FILLED ORAL
COMMUNITY
Start: 2024-11-22

## 2024-12-03 RX ORDER — METFORMIN HYDROCHLORIDE 500 MG/1
500 TABLET, EXTENDED RELEASE ORAL
Qty: 30 TABLET | Refills: 5 | Status: SHIPPED | OUTPATIENT
Start: 2024-12-03

## 2024-12-03 NOTE — LETTER
December 3, 2024     Patient: Charity Orr   YOB: 1970   Date of Visit: 12/3/2024       To whom it concerns,     Please follow blood pressure once a week.  If blood pressure is greater than 140/90 then repeat in resting position sitting with feet on the ground.  If persists then recheck 3 days in row.  If continues to be elevated contact primary care provider office.     If patient with heartburn and/or acid reflux symptoms then encourage to drink milk as needed.  Continue the omeprazole daily.             Sincerely,        Washington Rodriguez MD    CC: No Recipients

## 2024-12-03 NOTE — PROGRESS NOTES
Chief Complaint   Patient presents with    Annual Exam       HPI:  Charity Orr, -1970, is a 54 y.o. female who presents for an annual physical.  Caregiver, Emily, was present during the history-taking and subsequent discussion (and for part of the physical exam) with this patient.  Patient agrees to the presence of the individual during this visit.     53-year-old with history of hypertension, GERD, hyperlipidemia with triglyceridemia, prediabetes and developmental delay with schizoaffective disorder.     Some diarrhea/loose stools for the last 3-4 weeks that occurs 30-45 minutes after eats and does not matter what she eats.  History of colonoscopy 24 with benign and tubular adenoma only findings.  No F, C, N, V, Rashes or noted blood in stool.     History of hypertension.  Currently, has been feeling well and asymptomatic without any headaches, vision changes, cough, chest pain, shortness of breath, swelling, focal neurologic deficit, memory loss or syncope.  Has been taking the medications regularly and adherent with the regimen of amlodipine 5 mg daily and metoprolol succinate  mg daily.  Denies medication side effects and no significant interval events.       History of diabetes with last A1c of 7.5% on 2024 and 6.9% on 2024 currently on no medications.     Recent Hospitalizations:  No hospitalization(s) within the last year..    Current Medical Providers:  Patient Care Team:  Washington Rodriguez MD as PCP - General (Internal Medicine)  Oly Zaman APRN as Nurse Practitioner (Obstetrics and Gynecology)    Compared to one year ago, the patient feels her physical health is the same and her mental health is the same.    Depression Screen:      2024     1:29 PM   PHQ-2/PHQ-9 Depression Screening   Retired Little Interest or Pleasure in Doing Things 0-->not at all   Retired Feeling Down, Depressed or Hopeless 0-->not at all   Retired PHQ-9: Brief Depression Severity Measure  Score 0       Past Medical/Family/Social History:  The following portions of the patient's history were reviewed and updated as appropriate: allergies, current medications, past family history, past medical history, past social history, past surgical history, and problem list.    Allergies   Allergen Reactions    Depo-Provera [Medroxyprogesterone Acetate] Hives    Penicillins Swelling    Bee Venom Other (See Comments)     BEE STING         Current Outpatient Medications:     Acetaminophen-Pamabrom (Midol Teen) 500-25 MG tablet, Take 1 tablet by mouth 3 (Three) Times a Day As Needed (cramps)., Disp: 30 tablet, Rfl: 11    amLODIPine (NORVASC) 5 MG tablet, TAKE ONE TABLET BY MOUTH DAILY, Disp: 30 tablet, Rfl: 11    benzocaine-menthol (Sore Throat) 15-2.6 MG lozenge lozenge, Dissolve 1 lozenge in the mouth Every 4 (Four) Hours As Needed (sore throat)., Disp: 32 lozenge, Rfl: 11    benztropine (COGENTIN) 1 MG tablet, Take 1 tablet by mouth 2 (Two) Times a Day., Disp: 180 tablet, Rfl: 0    Calcium + Vitamin D3 600-10 MG-MCG tablet per tablet, TAKE ONE TABLET BY MOUTH DAILY, Disp: 30 tablet, Rfl: 11    cetirizine (zyrTEC) 10 MG tablet, TAKE ONE TABLET BY MOUTH DAILY, Disp: 90 tablet, Rfl: 3    chlorhexidine (PERIDEX) 0.12 % solution, BRUSH TEETH WITH ONE-HALF OUNCE EVERY MORNING AND IN THE EVENING, Disp: 473 mL, Rfl: 11    clotrimazole (LOTRIMIN) 1 % cream, Apply 1 Application topically to the appropriate area as directed 2 (Two) Times a Day. APPLY TO AFFECTED AREA, Disp: , Rfl:     CORICIDIN 2-325 MG tablet, TAKE ONE TABLET BY MOUTH EVERY EIGHT HOURS AS NEEDED, Disp: 20 tablet, Rfl: 11    divalproex (DEPAKOTE) 500 MG DR tablet, Take 2 tablets by mouth 2 (Two) Times a Day., Disp: , Rfl: 2    docusate sodium (COLACE) 100 MG capsule, , Disp: , Rfl:     fluticasone (FLONASE) 50 MCG/ACT nasal spray, INSTILL ONE SPRAY IN EACH NOSTRIL DAILY, Disp: 16 g, Rfl: 8    haloperidol (HALDOL) 10 MG tablet, Take 1 tablet by mouth 2 (Two)  Times a Day., Disp: , Rfl:     haloperidol (HALDOL) 5 MG tablet, Take 1 tablet by mouth Daily. Takes at noon, Disp: , Rfl:     hydrocortisone 1 % cream, APPLY TO AFFECTED AREA TWICE DAILY AS NEEDED, Disp: 28.35 g, Rfl: 1    ibuprofen (ADVIL,MOTRIN) 200 MG tablet, TAKE ONE TABLET BY MOUTH EVERY 6 HOURS AS NEEDED FOR PAIN, Disp: 60 tablet, Rfl: 11    loperamide (Imodium A-D) 2 MG tablet, Take 1 tablet by mouth 4 (Four) Times a Day As Needed for Diarrhea., Disp: 20 tablet, Rfl: 0    LORazepam (ATIVAN) 1 MG tablet, TAKE ONE TABLET BY MOUTH TWICE DAILY, Disp: 60 tablet, Rfl: 1    metoprolol succinate XL (TOPROL-XL) 100 MG 24 hr tablet, TAKE ONE TABLET BY MOUTH DAILY, Disp: 30 tablet, Rfl: 11    mirtazapine (REMERON) 7.5 MG tablet, TAKE ONE TABLET BY MOUTH AT BEDTIME, Disp: 30 tablet, Rfl: 5    omeprazole (priLOSEC) 40 MG capsule, TAKE ONE CAPSULE DAILY, Disp: 28 capsule, Rfl: 4    One-Daily Multi-Vitamin tablet tablet, TAKE ONE TABLET BY MOUTH DAILY, Disp: 30 tablet, Rfl: 11    Sodium Fluoride 5000 Plus 1.1 % cream, USE TO BRUSH teeth AT least ONCE DAY, Disp: , Rfl:     traZODone (DESYREL) 50 MG tablet, Take 1 tablet by mouth every night at bedtime., Disp: , Rfl: 2    Tri-Monica 0.18/0.215/0.25 MG-35 MCG per tablet, TAKE ONE TABLET BY MOUTH DAILY, Disp: 28 tablet, Rfl: 6    vitamin C (ASCORBIC ACID) 500 MG tablet, TAKE ONE TABLET BY MOUTH DAILY, Disp: 30 tablet, Rfl: 11    metFORMIN ER (GLUCOPHAGE-XR) 500 MG 24 hr tablet, Take 1 tablet by mouth Daily With Breakfast., Disp: 30 tablet, Rfl: 5    Current medication list contains high risk medications. Some potential harmful drug interactions identified. Plan of action: Patient has multiple medications that can cause fatigue sleepiness increased risk for falls however are required due to her underlying issues and being followed by psychiatry.    Family History   Problem Relation Age of Onset    No Known Problems Mother     Malig Hyperthermia Neg Hx        Social History      Tobacco Use    Smoking status: Never    Smokeless tobacco: Never   Substance Use Topics    Alcohol use: No       Past Surgical History:   Procedure Laterality Date    COLONOSCOPY N/A 2/2/2023    Procedure: COLONOSCOPY TO CECUM AND INTO TERMINAL ILEUM WITH COLD SNARE POLYPECTOMY AND COLD BIOPSIES;  Surgeon: Yovany Gr MD;  Location: [facility] ENDOSCOPY;  Service: Gastroenterology;  Laterality: N/A;  PRE- JENNY UMBILICAL PAIN, DIARRHEA  POST-  POLYP, HEMORRHOIDS    NO PAST SURGERIES         Patient Active Problem List   Diagnosis    Hypertension    GERD (gastroesophageal reflux disease)    Schizophrenic disorder    Mild mental retardation    Leukocytosis    Hyperlipidemia    Essential hypertriglyceridemia    Dietary calcium deficiency    Tinea pedis    Schizoaffective disorder    Low back strain    Annual physical exam    Acute non-recurrent maxillary sinusitis    Seasonal allergic rhinitis due to pollen    Developmental mental disorder    Chronic diarrhea    Obesity (BMI 30.0-34.9)    Type 2 diabetes mellitus without complication, without long-term current use of insulin       Review of Systems   Constitutional:  Negative for activity change, appetite change, fatigue, fever, unexpected weight gain and unexpected weight loss.   HENT:  Negative for nosebleeds, rhinorrhea, trouble swallowing and voice change.    Eyes:  Negative for visual disturbance.   Respiratory:  Negative for cough, chest tightness, shortness of breath and wheezing.    Cardiovascular:  Negative for chest pain, palpitations and leg swelling.   Gastrointestinal:  Negative for abdominal pain, blood in stool, constipation, diarrhea, nausea, vomiting, GERD and indigestion.   Genitourinary:  Negative for dysuria, frequency and hematuria.   Musculoskeletal:  Negative for arthralgias, back pain and myalgias.   Skin:  Negative for rash and wound.   Neurological:  Negative for dizziness, tremors, weakness, light-headedness, numbness, headache and  "memory problem.   Hematological:  Negative for adenopathy. Does not bruise/bleed easily.   Psychiatric/Behavioral:  Negative for sleep disturbance and depressed mood. The patient is not nervous/anxious.        Objective     Vitals:    12/03/24 1120   BP: 128/88   Pulse: 73   Temp: 96.9 °F (36.1 °C)   TempSrc: Temporal   SpO2: 94%   Weight: 93.7 kg (206 lb 9.6 oz)   Height: 157.5 cm (62.01\")       Class 2 Severe Obesity (BMI >=35 and <=39.9). Obesity-related health conditions include the following: hypertension, diabetes mellitus, dyslipidemias, and GERD. Obesity is unchanged. BMI is is above average; BMI management plan is completed. We discussed low calorie, low carb based diet program, portion control, and increasing exercise.      No results found.    Physical Exam  Vitals and nursing note reviewed.   Constitutional:       General: She is not in acute distress.     Appearance: She is well-developed. She is obese. She is not diaphoretic.   HENT:      Head: Normocephalic and atraumatic.      Right Ear: External ear normal.      Left Ear: External ear normal.      Nose: Nose normal.   Eyes:      Conjunctiva/sclera: Conjunctivae normal.      Pupils: Pupils are equal, round, and reactive to light.   Neck:      Thyroid: No thyromegaly.      Trachea: No tracheal deviation.   Cardiovascular:      Rate and Rhythm: Normal rate and regular rhythm.      Heart sounds: Normal heart sounds. No murmur heard.     No friction rub. No gallop.   Pulmonary:      Effort: Pulmonary effort is normal. No respiratory distress.      Breath sounds: Normal breath sounds.   Abdominal:      General: Bowel sounds are normal.      Palpations: Abdomen is soft. There is no mass.      Tenderness: There is no abdominal tenderness. There is no guarding.   Musculoskeletal:         General: Normal range of motion.      Cervical back: Normal range of motion and neck supple.   Lymphadenopathy:      Cervical: No cervical adenopathy.   Skin:     General: " Skin is warm and dry.      Capillary Refill: Capillary refill takes less than 2 seconds.      Findings: No rash.   Neurological:      Mental Status: She is alert and oriented to person, place, and time.      Motor: No abnormal muscle tone.      Deep Tendon Reflexes: Reflexes normal.   Psychiatric:         Behavior: Behavior normal.         Thought Content: Thought content normal.         Judgment: Judgment normal.         Recent Lab Results:  Lab Results   Component Value Date    Glucose 186 (H) 09/30/2024    Glucose 149 (H) 05/09/2023    Glucose, UA Negative 09/30/2024     Lab Results   Component Value Date    Total Cholesterol 121 09/30/2024    Triglycerides 412 (H) 09/30/2024    HDL Cholesterol 32 (L) 09/30/2024    VLDL Cholesterol 63.4 07/16/2019    VLDL Cholesterol Eze 58 (H) 09/30/2024       Assessment & Plan   Age-appropriate Screening Schedule:  Refer to the list below for future screening recommendations based on patient's age, sex and/or medical conditions.      Health Maintenance   Topic Date Due    Pneumococcal Vaccine 0-64 (1 of 2 - PCV) Never done    DIABETIC FOOT EXAM  Never done    DIABETIC EYE EXAM  Never done    URINE MICROALBUMIN  Never done    Hepatitis B (1 of 3 - 19+ 3-dose series) Never done    TDAP/TD VACCINES (1 - Tdap) Never done    ZOSTER VACCINE (1 of 2) Never done    PAP SMEAR  04/17/2022    INFLUENZA VACCINE  07/01/2024    COVID-19 Vaccine (5 - 2024-25 season) 09/01/2024    ANNUAL PHYSICAL  11/29/2024    HEMOGLOBIN A1C  03/30/2025    MAMMOGRAM  08/08/2025    LIPID PANEL  09/30/2025    BMI FOLLOWUP  12/03/2025    COLORECTAL CANCER SCREENING  02/02/2026    HEPATITIS C SCREENING  Completed       Diagnoses and all orders for this visit:    1. Annual physical exam (Primary)    2. Primary hypertension    3. Type 2 diabetes mellitus without complication, without long-term current use of insulin  -     metFORMIN ER (GLUCOPHAGE-XR) 500 MG 24 hr tablet; Take 1 tablet by mouth Daily With  Breakfast.  Dispense: 30 tablet; Refill: 5    4. Immunization due  -     Fluzone >6mos (0309-3071)  -     COVID-19 (Pfizer) 12yrs+ (COMIRNATY)        Annual wellness visit reviewed with patient.  All past history, medications, social history, and problem list were reviewed.  Discussed advanced directives and living will.  Patient has living will: Living will: Directive already scanned in chart.  Will check the labs as ordered above to evaluate the blood sugars, kidney, liver, cholesterol for screening.  Discussed flu shot recommended to get the influenza vaccine annually in the fall.  Tdap, influenza, COVID booster, Prevnar 20, hepatitis B series, and Shingrix series discussed patient will be getting the flu and COVID booster today consider other vaccines on follow-up.  Encouraged follow-up with the eye doctor on annual basis.  Discussed weight and encouraged exercise as tolerated while following a healthy diet.  Reviewed sexual health and safe sex practices.  Colon cancer screening discussed and current status: colonoscopy Colonoscopy on 2/2/2023 by Dr. Gr demonstrated polyps and repeat after 3 years recommended.  Discussed female health and screening including Pap smear/ pelvic exam/ self breast exam/ mammogram.  Follow up with current specialists as needed.     An After Visit Summary with all of these plans were given to the patient.        Follow Up:  Return in about 4 months (around 4/3/2025) for Next scheduled follow up.

## 2024-12-20 RX ORDER — OMEPRAZOLE 40 MG/1
CAPSULE, DELAYED RELEASE ORAL
Qty: 28 CAPSULE | Refills: 4 | Status: SHIPPED | OUTPATIENT
Start: 2024-12-20

## 2025-01-20 DIAGNOSIS — F20.9 SCHIZOPHRENIC DISORDER: ICD-10-CM

## 2025-01-20 DIAGNOSIS — F25.9 SCHIZOAFFECTIVE DISORDER, UNSPECIFIED TYPE: ICD-10-CM

## 2025-01-20 RX ORDER — LORAZEPAM 1 MG/1
TABLET ORAL
Qty: 60 TABLET | Refills: 1 | Status: SHIPPED | OUTPATIENT
Start: 2025-01-20

## 2025-02-06 RX ORDER — ACETAMINOPHEN AND CHLORPHENIRAMINE MALEATE 325; 2 MG/1; MG/1
TABLET, FILM COATED ORAL
Qty: 20 TABLET | Refills: 11 | Status: SHIPPED | OUTPATIENT
Start: 2025-02-06

## 2025-02-06 RX ORDER — IBUPROFEN 200 MG
TABLET ORAL
Qty: 60 TABLET | Refills: 11 | Status: SHIPPED | OUTPATIENT
Start: 2025-02-06

## 2025-02-06 RX ORDER — BENZOCAINE/MENTHOL 6 MG-10 MG
LOZENGE MUCOUS MEMBRANE
Qty: 28.35 G | Refills: 11 | Status: SHIPPED | OUTPATIENT
Start: 2025-02-06

## 2025-02-06 RX ORDER — BISMUTH SUBSALICYLATE 525 MG/15ML
SUSPENSION ORAL
Qty: 20 TABLET | Refills: 6 | Status: SHIPPED | OUTPATIENT
Start: 2025-02-06

## 2025-02-06 NOTE — TELEPHONE ENCOUNTER
LOV 12/3/24  NOV 2/12/25  LF 6/14/23, 1/11/24, 11/9/22 and 5/28/24    Protocol  Please advise    [facility] [place]

## 2025-02-13 DIAGNOSIS — H65.91 FLUID LEVEL BEHIND TYMPANIC MEMBRANE OF RIGHT EAR: ICD-10-CM

## 2025-02-13 RX ORDER — CETIRIZINE HYDROCHLORIDE 10 MG/1
TABLET ORAL
Qty: 90 TABLET | Refills: 3 | Status: SHIPPED | OUTPATIENT
Start: 2025-02-13

## 2025-03-05 RX ORDER — NORGESTIMATE AND ETHINYL ESTRADIOL
1 KIT DAILY
Qty: 28 TABLET | Refills: 6 | Status: SHIPPED | OUTPATIENT
Start: 2025-03-05

## 2025-03-12 DIAGNOSIS — F20.9 SCHIZOPHRENIC DISORDER: ICD-10-CM

## 2025-03-12 DIAGNOSIS — F25.9 SCHIZOAFFECTIVE DISORDER, UNSPECIFIED TYPE: ICD-10-CM

## 2025-03-12 RX ORDER — LORAZEPAM 1 MG/1
1 TABLET ORAL 2 TIMES DAILY
Qty: 60 TABLET | Refills: 0 | Status: SHIPPED | OUTPATIENT
Start: 2025-03-12

## 2025-03-27 DIAGNOSIS — F25.9 SCHIZOAFFECTIVE DISORDER, UNSPECIFIED TYPE: ICD-10-CM

## 2025-03-27 DIAGNOSIS — F20.9 SCHIZOPHRENIC DISORDER: ICD-10-CM

## 2025-03-28 RX ORDER — LORAZEPAM 1 MG/1
1 TABLET ORAL 2 TIMES DAILY
Qty: 60 TABLET | Refills: 0 | Status: SHIPPED | OUTPATIENT
Start: 2025-03-28

## 2025-03-28 RX ORDER — MULTIVITAMIN
1 TABLET ORAL DAILY
Qty: 30 TABLET | Refills: 2 | Status: SHIPPED | OUTPATIENT
Start: 2025-03-28

## 2025-03-28 RX ORDER — ASCORBIC ACID 500 MG
500 TABLET ORAL DAILY
Qty: 30 TABLET | Refills: 2 | Status: SHIPPED | OUTPATIENT
Start: 2025-03-28

## 2025-03-28 RX ORDER — AMLODIPINE BESYLATE 5 MG/1
5 TABLET ORAL DAILY
Qty: 30 TABLET | Refills: 2 | Status: SHIPPED | OUTPATIENT
Start: 2025-03-28

## 2025-03-28 RX ORDER — CALCIUM CARBONATE/VITAMIN D3 600 MG-10
1 TABLET ORAL DAILY
Qty: 30 TABLET | Refills: 2 | Status: SHIPPED | OUTPATIENT
Start: 2025-03-28

## 2025-04-07 RX ORDER — CHLORHEXIDINE GLUCONATE ORAL RINSE 1.2 MG/ML
SOLUTION DENTAL
Qty: 473 ML | Refills: 11 | Status: SHIPPED | OUTPATIENT
Start: 2025-04-07

## 2025-04-09 ENCOUNTER — OFFICE VISIT (OUTPATIENT)
Dept: FAMILY MEDICINE CLINIC | Facility: CLINIC | Age: 55
End: 2025-04-09
Payer: MEDICAID

## 2025-04-09 VITALS
WEIGHT: 201.4 LBS | HEIGHT: 62 IN | HEART RATE: 70 BPM | BODY MASS INDEX: 37.06 KG/M2 | DIASTOLIC BLOOD PRESSURE: 78 MMHG | SYSTOLIC BLOOD PRESSURE: 118 MMHG | OXYGEN SATURATION: 93 % | TEMPERATURE: 97.3 F

## 2025-04-09 DIAGNOSIS — E11.9 TYPE 2 DIABETES MELLITUS WITHOUT COMPLICATION, WITHOUT LONG-TERM CURRENT USE OF INSULIN: Primary | ICD-10-CM

## 2025-04-09 DIAGNOSIS — E78.1 ESSENTIAL HYPERTRIGLYCERIDEMIA: ICD-10-CM

## 2025-04-09 DIAGNOSIS — E78.2 MIXED HYPERLIPIDEMIA: ICD-10-CM

## 2025-04-09 DIAGNOSIS — I10 PRIMARY HYPERTENSION: ICD-10-CM

## 2025-04-09 RX ORDER — METFORMIN HYDROCHLORIDE 500 MG/1
1000 TABLET, EXTENDED RELEASE ORAL
Qty: 60 TABLET | Refills: 5 | Status: SHIPPED | OUTPATIENT
Start: 2025-04-09

## 2025-04-09 NOTE — PROGRESS NOTES
Subjective   Charity Orr is a 54 y.o. female.     Chief Complaint   Patient presents with    Diabetes       History of Present Illness   Caregiver, Emily, was present during the history-taking and subsequent discussion (and for part of the physical exam) with this patient.  Patient agrees to the presence of the individual during this visit.     53-year-old with history of hypertension, GERD, hyperlipidemia with triglyceridemia, prediabetes and developmental delay with schizoaffective disorder.     Some diarrhea/loose stools for the last 3-4 weeks that occurs 30-45 minutes after eats and does not matter what she eats.  History of colonoscopy 2/2/24 with benign and tubular adenoma only findings.  No F, C, N, V, Rashes or noted blood in stool.     History of hypertension.  Currently, has been feeling well and asymptomatic without any headaches, vision changes, cough, chest pain, shortness of breath, swelling, focal neurologic deficit, memory loss or syncope.  Has been taking the medications regularly and adherent with the regimen of amlodipine 5 mg daily and metoprolol succinate  mg daily.  Denies medication side effects and no significant interval events.       History of diabetes with last A1c on 3/10/25 of 7.1%.  currently on no medications.    The following portions of the patient's history were reviewed and updated as appropriate: allergies, current medications, past family history, past medical history, past social history, past surgical history and problem list.    Depression Screen:      9/30/2024     1:29 PM   PHQ-2/PHQ-9 Depression Screening   Retired Little Interest or Pleasure in Doing Things 0-->not at all    Retired Feeling Down, Depressed or Hopeless 0-->not at all    Retired PHQ-9: Brief Depression Severity Measure Score 0        Data saved with a previous flowsheet row definition       Past Medical History:   Diagnosis Date    Allergic rhinitis     Back pain     Dietary calcium deficiency      Esophageal reflux     Essential hypertriglyceridemia     Flu vaccine need     Foot pain, left     GERD (gastroesophageal reflux disease)     History of allergy     Hyperlipidemia     Hypertension     Left foot pain     Leukocytosis     Loose stools     Lumbar muscle pain     Mild mental retardation     Mild mental retardation     Pain     Schizophrenic disorder     Soft tissue mass     Sprain of ankle     Tinea pedis     Tremor due to multiple drugs     UTI (urinary tract infection)        Past Surgical History:   Procedure Laterality Date    COLONOSCOPY N/A 2/2/2023    Procedure: COLONOSCOPY TO CECUM AND INTO TERMINAL ILEUM WITH COLD SNARE POLYPECTOMY AND COLD BIOPSIES;  Surgeon: Yovany Gr MD;  Location: [facility] ENDOSCOPY;  Service: Gastroenterology;  Laterality: N/A;  PRE- JENNY UMBILICAL PAIN, DIARRHEA  POST-  POLYP, HEMORRHOIDS    NO PAST SURGERIES         Family History   Problem Relation Age of Onset    No Known Problems Mother     Malig Hyperthermia Neg Hx        Social History     Socioeconomic History    Marital status: Single   Tobacco Use    Smoking status: Never    Smokeless tobacco: Never   Vaping Use    Vaping status: Never Used   Substance and Sexual Activity    Alcohol use: No    Drug use: No       Current Outpatient Medications   Medication Sig Dispense Refill    Acetaminophen-Caff-Pyrilamine 500-60-15 MG tablet TAKE ONE CAPSULE BY MOUTH tHREE TIMES DAILY AS NEEDED FOR CRAMPS 24 tablet 11    Acetaminophen-Pamabrom (Midol Teen) 500-25 MG tablet Take 1 tablet by mouth 3 (Three) Times a Day As Needed (cramps). 30 tablet 11    amLODIPine (NORVASC) 5 MG tablet TAKE ONE TABLET BY MOUTH DAILY 30 tablet 2    benzocaine-menthol (Sore Throat) 15-2.6 MG lozenge lozenge Dissolve 1 lozenge in the mouth Every 4 (Four) Hours As Needed (sore throat). 32 lozenge 11    benztropine (COGENTIN) 1 MG tablet Take 1 tablet by mouth 2 (Two) Times a Day. 180 tablet 0    calcium carb-cholecalciferol (Calcium +  Vitamin D3) 600-10 MG-MCG tablet per tablet TAKE ONE TABLET BY MOUTH DAILY 30 tablet 2    cetirizine (zyrTEC) 10 MG tablet TAKE ONE TABLET BY MOUTH DAILY 90 tablet 3    chlorhexidine (PERIDEX) 0.12 % solution BRUSH TEETH WITH ONE-HALF OUNCE EVERY MORNING AND IN THE EVENING 473 mL 11    clotrimazole (LOTRIMIN) 1 % cream Apply 1 Application topically to the appropriate area as directed 2 (Two) Times a Day. APPLY TO AFFECTED AREA      CORICIDIN 2-325 MG tablet TAKE ONE TABLET BY MOUTH EVERY EIGHT HOURS AS NEEDED 20 tablet 11    divalproex (DEPAKOTE) 500 MG DR tablet Take 2 tablets by mouth 2 (Two) Times a Day.  2    docusate sodium (COLACE) 100 MG capsule       fluticasone (FLONASE) 50 MCG/ACT nasal spray INSTILL ONE SPRAY IN EACH NOSTRIL DAILY 16 g 8    haloperidol (HALDOL) 10 MG tablet Take 1 tablet by mouth 2 (Two) Times a Day.      haloperidol (HALDOL) 5 MG tablet Take 1 tablet by mouth Daily. Takes at noon      hydrocortisone 1 % cream APPLY TO AFFECTED AREA TWICE DAILY AS NEEDED 28.35 g 11    ibuprofen (ADVIL,MOTRIN) 200 MG tablet TAKE ONE TABLET BY MOUTH EVERY 6 HOURS AS NEEDED FOR PAIN 60 tablet 11    loperamide (Anti-Diarrheal) 2 MG tablet TAKE ONE TABLET BY MOUTH AS NEEDED FOR DIARRHEA FOUR TIMES DAILY 20 tablet 6    LORazepam (ATIVAN) 1 MG tablet TAKE ONE TABLET BY MOUTH TWICE DAILY 60 tablet 0    metFORMIN ER (GLUCOPHAGE-XR) 500 MG 24 hr tablet Take 2 tablets by mouth Daily With Breakfast. 60 tablet 5    metoprolol succinate XL (TOPROL-XL) 100 MG 24 hr tablet TAKE ONE TABLET BY MOUTH DAILY 30 tablet 11    mirtazapine (REMERON) 7.5 MG tablet TAKE ONE TABLET BY MOUTH AT BEDTIME 30 tablet 5    multivitamin (One-Daily Multi-Vitamin) tablet tablet TAKE ONE TABLET BY MOUTH DAILY 30 tablet 2    omeprazole (priLOSEC) 40 MG capsule TAKE ONE CAPSULE DAILY 28 capsule 4    Sodium Fluoride 5000 Plus 1.1 % cream USE TO BRUSH teeth AT least ONCE DAY      traZODone (DESYREL) 50 MG tablet Take 1 tablet by mouth every night  "at bedtime.  2    Tri-Monica 0.18/0.215/0.25 MG-35 MCG per tablet TAKE ONE TABLET BY MOUTH DAILY 28 tablet 6    vitamin C (ASCORBIC ACID) 500 MG tablet TAKE ONE TABLET BY MOUTH DAILY 30 tablet 2     No current facility-administered medications for this visit.       Review of Systems   Constitutional:  Negative for chills and fever.   HENT:  Negative for congestion.    Respiratory:  Negative for cough and shortness of breath.    Cardiovascular:  Negative for chest pain, palpitations and leg swelling.   Gastrointestinal:  Negative for diarrhea.   Endocrine: Negative for polydipsia and polyuria.   Genitourinary:  Negative for frequency.   Musculoskeletal:  Negative for arthralgias and back pain.   Neurological:  Negative for speech difficulty.   Hematological:  Negative for adenopathy.       Objective   /78 (BP Location: Left arm, Patient Position: Sitting, Cuff Size: Large Adult)   Pulse 70   Temp 97.3 °F (36.3 °C) (Temporal)   Ht 157.5 cm (62.01\")   Wt 91.4 kg (201 lb 6.4 oz)   SpO2 93%   BMI 36.83 kg/m²     Physical Exam  Vitals and nursing note reviewed.   Constitutional:       General: She is not in acute distress.     Appearance: She is well-developed. She is obese. She is not diaphoretic.   HENT:      Head: Normocephalic and atraumatic.      Right Ear: External ear normal.      Left Ear: External ear normal.      Nose: Nose normal.   Eyes:      Conjunctiva/sclera: Conjunctivae normal.      Pupils: Pupils are equal, round, and reactive to light.   Neck:      Thyroid: No thyromegaly.      Trachea: No tracheal deviation.   Cardiovascular:      Rate and Rhythm: Normal rate and regular rhythm.      Heart sounds: Normal heart sounds. No murmur heard.     No friction rub. No gallop.   Pulmonary:      Effort: Pulmonary effort is normal. No respiratory distress.      Breath sounds: Normal breath sounds.   Abdominal:      General: Bowel sounds are normal.      Palpations: Abdomen is soft. There is no mass.      " Tenderness: There is no abdominal tenderness. There is no guarding.   Musculoskeletal:         General: Normal range of motion.      Cervical back: Normal range of motion and neck supple.   Lymphadenopathy:      Cervical: No cervical adenopathy.   Skin:     General: Skin is warm and dry.      Capillary Refill: Capillary refill takes less than 2 seconds.      Findings: No rash.   Neurological:      Mental Status: She is alert and oriented to person, place, and time.      Motor: No abnormal muscle tone.      Deep Tendon Reflexes: Reflexes normal.   Psychiatric:         Behavior: Behavior normal.         Thought Content: Thought content normal.         Judgment: Judgment normal.         No results found for this or any previous visit (from the past 12 weeks).  Assessment & Plan   Diagnoses and all orders for this visit:    1. Type 2 diabetes mellitus without complication, without long-term current use of insulin (Primary)  -     Microalbumin / Creatinine Urine Ratio - Urine, Clean Catch  -     metFORMIN ER (GLUCOPHAGE-XR) 500 MG 24 hr tablet; Take 2 tablets by mouth Daily With Breakfast.  Dispense: 60 tablet; Refill: 5    2. Mixed hyperlipidemia    3. Primary hypertension    4. Essential hypertriglyceridemia    Blood pressure is very well-controlled.  We discussed her medications and reviewed the labs that [place] which will be [place].  Was noted to have an A1c on that visit of 7.1% which is slightly better but still uncontrolled.  Will therefore increase the metformin so she is taking a total of 1000 mg daily.  Will check microalbumin today.  Patient will follow-up in 4 to 6 months to see how she is doing with her labs and her A1c.           Dictated utilizing Dragon Dictation

## 2025-04-10 LAB
ALBUMIN/CREAT UR: 12 MG/G CREAT (ref 0–29)
CREAT UR-MCNC: 181.1 MG/DL
MICROALBUMIN UR-MCNC: 21 UG/ML

## 2025-04-23 NOTE — PROGRESS NOTES
"Subjective   Charity Orr is a 49 y.o. female.     Chief Complaint   Patient presents with   • Annual Exam       History of Present Illness   Caregiver ,Garcia from [facility], was present during the history-taking and subsequent discussion (and for part of the physical exam) with this patient.  Patient agrees to the presence of the individual during this visit.    Here for her annual exam.  Continues to live in the group home through [facility] and is very happy.  She really has no new complaints at this time.  She did states she \"has a boil on her butt \"but she does not want me to see this.  No recent changes in medications at this time.  She does see [facility]necology [facility].    The following portions of the patient's history were reviewed and updated as appropriate: allergies, current medications, past family history, past medical history, past social history, past surgical history and problem list.    Depression Screen:  PHQ-2/PHQ-9 Depression Screening 1/22/2020   Little interest or pleasure in doing things 0   Feeling down, depressed, or hopeless 0   Trouble falling or staying asleep, or sleeping too much 0   Feeling tired or having little energy 0   Poor appetite or overeating 0   Feeling bad about yourself - or that you are a failure or have let yourself or your family down 0   Trouble concentrating on things, such as reading the newspaper or watching television 0   Moving or speaking so slowly that other people could have noticed. Or the opposite - being so fidgety or restless that you have been moving around a lot more than usual 0   Thoughts that you would be better off dead, or of hurting yourself in some way 0   Total Score 0       Past Medical History:   Diagnosis Date   • Allergic rhinitis    • Back pain    • Dietary calcium deficiency    • Esophageal reflux    • Essential hypertriglyceridemia    • Flu vaccine need    • Foot pain, left    • GERD (gastroesophageal reflux " disease)    • History of allergy    • Hyperlipidemia    • Hypertension    • Left foot pain    • Leukocytosis    • Lumbar muscle pain    • Mild mental retardation    • Mild mental retardation    • Pain    • Schizophrenic disorder (CMS/HCC)    • Soft tissue mass    • Sprain of ankle    • Tinea pedis    • Tremor due to multiple drugs    • UTI (urinary tract infection)        History reviewed. No pertinent surgical history.    Family History   Problem Relation Age of Onset   • No Known Problems Mother        Social History     Socioeconomic History   • Marital status: Single     Spouse name: Not on file   • Number of children: Not on file   • Years of education: Not on file   • Highest education level: Not on file   Tobacco Use   • Smoking status: Never Smoker   • Smokeless tobacco: Never Used   Substance and Sexual Activity   • Alcohol use: No     Frequency: Never   • Drug use: No       Current Outpatient Medications   Medication Sig Dispense Refill   • amLODIPine (NORVASC) 5 MG tablet Take 1 tablet by mouth Daily. 30 tablet 11   • Ascorbic Acid 500 MG capsule Take  by mouth.     • benztropine (COGENTIN) 1 MG tablet Take 1 mg by mouth 2 (Two) Times a Day.  5   • calcium carbonate-vitamin d 600-400 MG-UNIT per tablet TAKE ONE TABLET BY MOUTH DAILY 30 tablet 11   • CEPACOL EXTRA STRENGTH 15-2.6 MG lozenge lozenge DISSOLVE ONE LOZENGE AS NEEDED  5   • chlorhexidine (PERIDEX) 0.12 % solution Brush teeth with one-half ounce in the morning and evening. 473 mL 11   • CORICIDIN 2-325 MG tablet TAKE ONE TABLET BY MOUTH EVERY 8 HOURS AS NEEDED 10 tablet 11   • divalproex (DEPAKOTE) 500 MG DR tablet Take 1,000 mg by mouth 2 (Two) Times a Day.  2   • fluticasone (FLONASE) 50 MCG/ACT nasal spray INSTILL ONE SPRAY IN EACH NOSTRIL DAILY 16 g 11   • gemfibrozil (LOPID) 600 MG tablet Take 600 mg by mouth.     • haloperidol (HALDOL) 10 MG tablet Take 10 mg by mouth 2 (Two) Times a Day.  2   • loratadine (CLARITIN) 10 MG tablet Take 10  mg by mouth Daily.     • LORazepam (ATIVAN) 1 MG tablet TAKE ONE TABLET TWICE DAILY 60 tablet 1   • metoprolol succinate XL (TOPROL-XL) 50 MG 24 hr tablet TAKE ONE TABLET BY MOUTH DAILY 30 tablet 11   • MIDOL TEEN 500-25 MG tablet TAKE ONE TABLET BY MOUTH EVERY 6 TO 8 HOURS AS NEEDED MENSTRUAL CRAMPS  11   • mirtazapine (REMERON) 7.5 MG tablet TAKE ONE TABLET BY MOUTH AT BEDTIME 30 tablet 5   • Multiple Vitamin (MULTIVITAMIN) tablet TAKE ONE TABLET DAILY 30 tablet 11   • norgestimate-ethinyl estradiol (ORTHO TRI-CYCLEN LO) 0.18/0.215/0.25 MG-25 MCG per tablet Take  by mouth.     • omeprazole (priLOSEC) 40 MG capsule TAKE ONE CAPSULE BY MOUTH DAILY 30 capsule 2   • perphenazine (TRILAFON) 8 MG tablet Take 8 mg by mouth.     • SF 5000 PLUS 1.1 % cream USE TO BRUSH TEETH ONCE DAILY 51 g 5   • STOOL SOFTENER 100 MG capsule TAKE ONE CAPSULE DAILY 30 capsule 11   • traZODone (DESYREL) 50 MG tablet Take 50 mg by mouth every night at bedtime.  2   • TRI-SPRINTEC 0.18/0.215/0.25 MG-35 MCG per tablet TAKE ONE TABLET DAILY 28 tablet 11   • vitamin C (ASCORBIC ACID) 500 MG tablet TAKE ONE TABLET DAILY 30 tablet 11     No current facility-administered medications for this visit.        Review of Systems   Constitutional: Negative for activity change, appetite change, fatigue, fever, unexpected weight gain and unexpected weight loss.   HENT: Negative for nosebleeds, rhinorrhea, trouble swallowing and voice change.    Eyes: Negative for visual disturbance.   Respiratory: Negative for cough, chest tightness, shortness of breath and wheezing.    Cardiovascular: Negative for chest pain, palpitations and leg swelling.   Gastrointestinal: Negative for abdominal pain, blood in stool, constipation, diarrhea, nausea, vomiting, GERD and indigestion.   Genitourinary: Negative for dysuria, frequency and hematuria.   Musculoskeletal: Negative for arthralgias, back pain and myalgias.   Skin: Negative for rash and bruise.   Neurological:  "Negative for dizziness, tremors, weakness, light-headedness, numbness, headache and memory problem.   Hematological: Negative for adenopathy. Does not bruise/bleed easily.   Psychiatric/Behavioral: Negative for sleep disturbance and depressed mood. The patient is not nervous/anxious.        Objective   /82 (BP Location: Left arm, Patient Position: Sitting, Cuff Size: Large Adult)   Pulse 76   Temp 98.9 °F (37.2 °C) (Temporal)   Ht 157.5 cm (62.01\")   Wt 92.1 kg (203 lb)   SpO2 96%   BMI 37.12 kg/m²     Physical Exam   Constitutional: She is oriented to person, place, and time. She appears well-developed and well-nourished. No distress.   HENT:   Head: Normocephalic and atraumatic.   Right Ear: External ear normal.   Left Ear: External ear normal.   Nose: Nose normal.   Mouth/Throat: Oropharynx is clear and moist.   Eyes: Pupils are equal, round, and reactive to light. Conjunctivae and EOM are normal.   Neck: Normal range of motion. Neck supple. No tracheal deviation present. No thyromegaly present.   Cardiovascular: Normal rate, regular rhythm, normal heart sounds and intact distal pulses. Exam reveals no gallop and no friction rub.   No murmur heard.  Pulmonary/Chest: Effort normal and breath sounds normal. No respiratory distress.   Abdominal: Soft. Bowel sounds are normal. She exhibits no mass. There is no tenderness. There is no guarding.   Musculoskeletal: Normal range of motion. She exhibits no edema.   Lymphadenopathy:     She has no cervical adenopathy.   Neurological: She is alert and oriented to person, place, and time. She displays normal reflexes. She exhibits normal muscle tone.   Skin: Skin is warm and dry. Capillary refill takes less than 2 seconds. No rash noted. She is not diaphoretic.   Psychiatric: She has a normal mood and affect. Her behavior is normal. Judgment and thought content normal.   Nursing note and vitals reviewed.      No results found for this or any previous visit (from " the past 2016 hour(s)).  Assessment/Plan   Charity was seen today for annual exam.    Diagnoses and all orders for this visit:    Annual physical exam    Essential hypertension    Mixed hyperlipidemia    Essential hypertriglyceridemia    Gastroesophageal reflux disease without esophagitis      Annual physical form was completed in room and given to the [facility] employee/caregiver.  Continue current medications and monitor the blood pressures at home.  Labs from February were reviewed that were from psychiatric services.  She is to follow-up with gynecology.  Next year I would recommend that she has her colonoscopy performed or Cologuard.          Vaccine status unknown

## 2025-05-08 DIAGNOSIS — F25.9 SCHIZOAFFECTIVE DISORDER, UNSPECIFIED TYPE: ICD-10-CM

## 2025-05-08 DIAGNOSIS — F20.9 SCHIZOPHRENIC DISORDER: ICD-10-CM

## 2025-05-08 RX ORDER — OMEPRAZOLE 40 MG/1
40 CAPSULE, DELAYED RELEASE ORAL DAILY
Qty: 28 CAPSULE | Refills: 4 | Status: SHIPPED | OUTPATIENT
Start: 2025-05-08

## 2025-05-08 RX ORDER — LORAZEPAM 1 MG/1
1 TABLET ORAL 2 TIMES DAILY
Qty: 60 TABLET | Refills: 2 | Status: SHIPPED | OUTPATIENT
Start: 2025-05-08

## 2025-07-07 ENCOUNTER — TELEPHONE (OUTPATIENT)
Dept: FAMILY MEDICINE CLINIC | Facility: CLINIC | Age: 55
End: 2025-07-07

## 2025-07-07 RX ORDER — AMLODIPINE BESYLATE 5 MG/1
5 TABLET ORAL DAILY
Qty: 30 TABLET | Refills: 2 | Status: SHIPPED | OUTPATIENT
Start: 2025-07-07

## 2025-07-07 RX ORDER — CALCIUM CARBONATE/VITAMIN D3 600 MG-10
1 TABLET ORAL DAILY
Qty: 30 TABLET | Refills: 2 | Status: SHIPPED | OUTPATIENT
Start: 2025-07-07

## 2025-07-07 RX ORDER — MULTIVITAMIN
1 TABLET ORAL DAILY
Qty: 30 TABLET | Refills: 2 | Status: SHIPPED | OUTPATIENT
Start: 2025-07-07

## 2025-07-07 RX ORDER — ASCORBIC ACID 500 MG
500 TABLET ORAL DAILY
Qty: 30 TABLET | Refills: 2 | Status: SHIPPED | OUTPATIENT
Start: 2025-07-07

## 2025-07-08 ENCOUNTER — TELEPHONE (OUTPATIENT)
Dept: FAMILY MEDICINE CLINIC | Facility: CLINIC | Age: 55
End: 2025-07-08
Payer: MEDICAID

## 2025-07-08 NOTE — TELEPHONE ENCOUNTER
She called back and is going to refax the order.  She is asking if there is anyway another provider could sign them by chance?  She states there person who inputs them will be on vacation so it'll end up delaying services if they have to wait.

## 2025-07-08 NOTE — TELEPHONE ENCOUNTER
Michelle just called again, she is asking if it could actually be faxed to [phone].  That way it'll go straight to her.

## 2025-07-08 NOTE — TELEPHONE ENCOUNTER
Michelle victor [place] called and states that they sent over an order for Occupational Therapy that needs to be filled out and signed.  She says they have faxed it over a couple times but it hasn't been returned.  She was asking if it could be faxed over today please.  There person who handles those are going on vacation starting tomorrow and will be gone a week.  Fax number is [phone].  Her phone number if you have any questions is [phone].

## 2025-07-08 NOTE — TELEPHONE ENCOUNTER
Called and LVM that Dr bellamy is on vacation and will return next week.  I have not seen it come across but if you will refax it I will get it to his desk for his return

## 2025-07-30 DIAGNOSIS — F20.9 SCHIZOPHRENIC DISORDER: ICD-10-CM

## 2025-07-30 DIAGNOSIS — F25.9 SCHIZOAFFECTIVE DISORDER, UNSPECIFIED TYPE: ICD-10-CM

## 2025-07-30 RX ORDER — LORAZEPAM 1 MG/1
1 TABLET ORAL 2 TIMES DAILY
Qty: 60 TABLET | Refills: 0 | Status: SHIPPED | OUTPATIENT
Start: 2025-07-30

## 2025-08-06 ENCOUNTER — OFFICE VISIT (OUTPATIENT)
Dept: FAMILY MEDICINE CLINIC | Facility: CLINIC | Age: 55
End: 2025-08-06
Payer: MEDICAID

## 2025-08-06 VITALS
HEART RATE: 80 BPM | SYSTOLIC BLOOD PRESSURE: 100 MMHG | DIASTOLIC BLOOD PRESSURE: 70 MMHG | OXYGEN SATURATION: 97 % | TEMPERATURE: 97.4 F | HEIGHT: 62 IN | WEIGHT: 195.6 LBS | BODY MASS INDEX: 35.99 KG/M2

## 2025-08-06 DIAGNOSIS — E11.9 TYPE 2 DIABETES MELLITUS WITHOUT COMPLICATION, WITHOUT LONG-TERM CURRENT USE OF INSULIN: Primary | ICD-10-CM

## 2025-08-06 DIAGNOSIS — I10 PRIMARY HYPERTENSION: ICD-10-CM

## 2025-08-06 DIAGNOSIS — E78.1 ESSENTIAL HYPERTRIGLYCERIDEMIA: ICD-10-CM

## 2025-08-06 DIAGNOSIS — K52.9 CHRONIC DIARRHEA: ICD-10-CM

## 2025-08-06 DIAGNOSIS — E78.2 MIXED HYPERLIPIDEMIA: ICD-10-CM

## 2025-08-07 LAB
ALBUMIN SERPL-MCNC: 3.6 G/DL (ref 3.5–5.2)
ALBUMIN/GLOB SERPL: 1.6 G/DL
ALP SERPL-CCNC: 67 U/L (ref 39–117)
ALT SERPL-CCNC: 12 U/L (ref 1–33)
AST SERPL-CCNC: 18 U/L (ref 1–32)
BILIRUB SERPL-MCNC: 0.2 MG/DL (ref 0–1.2)
BUN SERPL-MCNC: 14 MG/DL (ref 6–20)
BUN/CREAT SERPL: 19.7 (ref 7–25)
CALCIUM SERPL-MCNC: 9 MG/DL (ref 8.6–10.5)
CHLORIDE SERPL-SCNC: 102 MMOL/L (ref 98–107)
CHOLEST SERPL-MCNC: 105 MG/DL (ref 0–200)
CO2 SERPL-SCNC: 23 MMOL/L (ref 22–29)
CREAT SERPL-MCNC: 0.71 MG/DL (ref 0.57–1)
EGFRCR SERPLBLD CKD-EPI 2021: 101.2 ML/MIN/1.73
ENDOMYSIUM IGA SER QL: NEGATIVE
GLOBULIN SER CALC-MCNC: 2.2 GM/DL
GLUCOSE SERPL-MCNC: 139 MG/DL (ref 65–99)
HBA1C MFR BLD: 6.8 % (ref 4.8–5.6)
HDLC SERPL-MCNC: 25 MG/DL (ref 40–60)
IGA SERPL-MCNC: 219 MG/DL (ref 87–352)
LDLC SERPL CALC-MCNC: 19 MG/DL (ref 0–100)
POTASSIUM SERPL-SCNC: 4.2 MMOL/L (ref 3.5–5.2)
PROT SERPL-MCNC: 5.8 G/DL (ref 6–8.5)
SODIUM SERPL-SCNC: 141 MMOL/L (ref 136–145)
TRIGL SERPL-MCNC: 446 MG/DL (ref 0–150)
TTG IGA SER-ACNC: <2 U/ML (ref 0–3)
VLDLC SERPL CALC-MCNC: 61 MG/DL (ref 5–40)

## 2025-08-20 RX ORDER — SODIUM FLUORIDE 5 MG/G
CREAM DENTAL
Qty: 51 G | Refills: 12 | Status: SHIPPED | OUTPATIENT
Start: 2025-08-20

## 2025-08-27 DIAGNOSIS — F20.9 SCHIZOPHRENIC DISORDER: ICD-10-CM

## 2025-08-27 DIAGNOSIS — F25.9 SCHIZOAFFECTIVE DISORDER, UNSPECIFIED TYPE: ICD-10-CM

## 2025-08-27 RX ORDER — LORAZEPAM 1 MG/1
1 TABLET ORAL 2 TIMES DAILY
Qty: 60 TABLET | Refills: 2 | Status: SHIPPED | OUTPATIENT
Start: 2025-08-27

## 2025-08-29 DIAGNOSIS — E11.9 TYPE 2 DIABETES MELLITUS WITHOUT COMPLICATION, WITHOUT LONG-TERM CURRENT USE OF INSULIN: ICD-10-CM

## 2025-08-29 RX ORDER — METFORMIN HYDROCHLORIDE 500 MG/1
1000 TABLET, EXTENDED RELEASE ORAL
Qty: 60 TABLET | Refills: 5 | Status: SHIPPED | OUTPATIENT
Start: 2025-08-29

## (undated) DEVICE — SINGLE-USE BIOPSY FORCEPS: Brand: RADIAL JAW 4

## (undated) DEVICE — SENSR O2 OXIMAX FNGR A/ 18IN NONSTR

## (undated) DEVICE — LN SMPL CO2 SHTRM SD STREAM W/M LUER

## (undated) DEVICE — CANN O2 ETCO2 FITS ALL CONN CO2 SMPL A/ 7IN DISP LF

## (undated) DEVICE — KT ORCA ORCAPOD DISP STRL

## (undated) DEVICE — SNAR POLYP CAPTIVATOR RND STFF 2.4 240CM 10MM 1P/U

## (undated) DEVICE — ADAPT CLN BIOGUARD AIR/H2O DISP

## (undated) DEVICE — THE SINGLE USE ETRAP – POLYP TRAP IS USED FOR SUCTION RETRIEVAL OF ENDOSCOPICALLY REMOVED POLYPS.: Brand: ETRAP

## (undated) DEVICE — TUBING, SUCTION, 1/4" X 10', STRAIGHT: Brand: MEDLINE